# Patient Record
Sex: FEMALE | Race: WHITE | NOT HISPANIC OR LATINO | Employment: OTHER | URBAN - METROPOLITAN AREA
[De-identification: names, ages, dates, MRNs, and addresses within clinical notes are randomized per-mention and may not be internally consistent; named-entity substitution may affect disease eponyms.]

---

## 2017-01-05 ENCOUNTER — TELEPHONE (OUTPATIENT)
Dept: NEUROLOGY | Facility: CLINIC | Age: 82
End: 2017-01-05

## 2017-01-05 NOTE — TELEPHONE ENCOUNTER
----- Message from Josr Agudelo sent at 1/5/2017  3:03 PM CST -----  Contact: Self [phone]  Patient is returning a missed call, \[facility]\"" call

## 2017-01-05 NOTE — TELEPHONE ENCOUNTER
----- Message from Josr Agudelo sent at 1/5/2017  1:32 PM CST -----  Contact: Suzan ( daughter ) [phone]  Caller is calling to schedule a f/u visit, pls call

## 2017-01-05 NOTE — TELEPHONE ENCOUNTER
Called daughter and left a voicemail in regards of rescheduling mothers appointment. Informed pt daughter to call back in-order to set up appointment.

## 2017-03-02 ENCOUNTER — OFFICE VISIT (OUTPATIENT)
Dept: PULMONOLOGY | Facility: CLINIC | Age: 82
End: 2017-03-02
Payer: MEDICARE

## 2017-03-02 VITALS
WEIGHT: 108 LBS | RESPIRATION RATE: 14 BRPM | BODY MASS INDEX: 18.44 KG/M2 | HEIGHT: 64 IN | OXYGEN SATURATION: 90 % | DIASTOLIC BLOOD PRESSURE: 58 MMHG | SYSTOLIC BLOOD PRESSURE: 98 MMHG | HEART RATE: 75 BPM

## 2017-03-02 DIAGNOSIS — F03.90 DEMENTIA WITHOUT BEHAVIORAL DISTURBANCE, UNSPECIFIED DEMENTIA TYPE: ICD-10-CM

## 2017-03-02 DIAGNOSIS — R63.4 WEIGHT LOSS: ICD-10-CM

## 2017-03-02 DIAGNOSIS — J44.9 CHRONIC OBSTRUCTIVE PULMONARY DISEASE, UNSPECIFIED COPD TYPE: Primary | ICD-10-CM

## 2017-03-02 PROCEDURE — 1159F MED LIST DOCD IN RCRD: CPT | Mod: S$GLB,,, | Performed by: INTERNAL MEDICINE

## 2017-03-02 PROCEDURE — 99499 UNLISTED E&M SERVICE: CPT | Mod: S$GLB,,, | Performed by: INTERNAL MEDICINE

## 2017-03-02 PROCEDURE — 99213 OFFICE O/P EST LOW 20 MIN: CPT | Mod: S$GLB,,, | Performed by: INTERNAL MEDICINE

## 2017-03-02 PROCEDURE — 1160F RVW MEDS BY RX/DR IN RCRD: CPT | Mod: S$GLB,,, | Performed by: INTERNAL MEDICINE

## 2017-03-02 PROCEDURE — 99999 PR PBB SHADOW E&M-EST. PATIENT-LVL IV: CPT | Mod: PBBFAC,,, | Performed by: INTERNAL MEDICINE

## 2017-03-02 PROCEDURE — 1126F AMNT PAIN NOTED NONE PRSNT: CPT | Mod: S$GLB,,, | Performed by: INTERNAL MEDICINE

## 2017-03-02 PROCEDURE — 1157F ADVNC CARE PLAN IN RCRD: CPT | Mod: S$GLB,,, | Performed by: INTERNAL MEDICINE

## 2017-03-02 RX ORDER — BUDESONIDE AND FORMOTEROL FUMARATE DIHYDRATE 160; 4.5 UG/1; UG/1
2 AEROSOL RESPIRATORY (INHALATION) EVERY 12 HOURS
Qty: 10.2 G | Refills: 6 | Status: SHIPPED | OUTPATIENT
Start: 2017-03-02 | End: 2018-04-03 | Stop reason: SDUPTHER

## 2017-03-02 NOTE — MR AVS SNAPSHOT
[facility] - Pulmonary Services  [address]  [facility] [address]  Phone: [phone]                  Zeynep Hamm   3/2/2017 3:30 PM   Office Visit    Description:  Female : 1931   Provider:  SERGEY Guevara MD   Department:  [facility] - Pulmonary Services           Diagnoses this Visit        Comments    Chronic obstructive pulmonary disease, unspecified COPD type    -  Primary     Dementia without behavioral disturbance, unspecified dementia type         Weight loss                To Do List           Goals (5 Years of Data)     None       These Medications        Disp Refills Start End    budesonide-formoterol 160-4.5 mcg (SYMBICORT) 160-4.5 mcg/actuation HFAA 10.2 g 6 3/2/2017     Inhale 2 puffs into the lungs every 12 (twelve) hours. Controller - Inhalation    Pharmacy: [facility] - [place], [place] - [address] [facility] #: [phone]         [facility] On Call     [facility] On Call Nurse [facility] -  Assistance  Registered nurses in the [facility] provide clinical advisement, health education, appointment booking, and other advisory services.  Call for this free service at [phone].             Medications           Message regarding Medications     Verify the changes and/or additions to your medication regime listed below are the same as discussed with your clinician today.  If any of these changes or additions are incorrect, please notify your healthcare provider.        CHANGE how you are taking these medications     Start Taking Instead of    budesonide-formoterol 160-4.5 mcg (SYMBICORT) 160-4.5 mcg/actuation HFAA SYMBICORT 160-4.5 mcg/actuation HFAA    Dosage:  Inhale 2 puffs into the lungs every 12 (twelve) hours. Controller Dosage:  INHALE 2 SPRAYS TWICE DAILY RINSE MOUTH AFTER USE    Reason for Change:  Reorder            Verify that the below list of medications is an accurate representation of the medications you are currently taking.  If none  reported, the list may be blank. If incorrect, please contact your healthcare provider. Carry this list with you in case of emergency.           Current Medications     budesonide-formoterol 160-4.5 mcg (SYMBICORT) 160-4.5 mcg/actuation HFAA Inhale 2 puffs into the lungs every 12 (twelve) hours. Controller    IBUPROFEN (ADVIL ORAL) Take 1 tablet by mouth as needed.    multivitamin capsule Take 1 capsule by mouth once daily.           Clinical Reference Information           Your Vitals Were     BP                   98/58           Blood Pressure          Most Recent Value    BP  (!)  98/58      Allergies as of 3/2/2017     No Known Allergies      Immunizations Administered on Date of Encounter - 3/2/2017     None      Orders Placed During Today's Visit      Normal Orders This Visit    Ambulatory consult to Neurology       [email or web address] Sign-Up     Activating your [facility] account is as easy as 1-2-3!     1) Visit [email or web address], select Sign Up Now, enter this activation code and your date of birth, then select Next.  [email or web address]  Expires: 4/16/2017  4:12 PM      2) Create a username and password to use when you visit [facility] in the future and select a security question in case you lose your password and select Next.    3) Enter your e-mail address and click Sign Up!    Additional Information  If you have questions, please e-mail [email or web address] or call [phone] to talk to our [facility] staff. Remember, [facility] is NOT to be used for urgent needs. For medical emergencies, dial 911.         Language Assistance Services     ATTENTION: Language assistance services are available, free of charge. Please call [phone].      ATENCIÓN: Si habla español, tiene a davidson disposición servicios gratuitos de asistencia lingüística. Llame al [phone].     WENDY Ý: N?u b?n nói Ti?ng Vi?t, có các d?ch v? h? tr? ngôn ng? mi?n phí dành cho b?n. G?i s? [phone].         [facility] complies  with applicable Federal civil rights laws and does not discriminate on the basis of race, color, national origin, age, disability, or sex.

## 2017-03-03 NOTE — PROGRESS NOTES
Subjective:       Patient ID: Zeynep Hamm is a 85 y.o. female.    Chief Complaint: COPD    HPI Ms. Hamm is an 85-year-old former smoker, who returns for an interval   assessment of chronic obstructive lung disease.  She is accompanied by her   daughter who supervises her medical treatment since Ms. Hamm is having   worsening memory loss.  They report that she has had stable respiratory symptoms   during the past several months.  She continues to use Symbicort and has    recently been making more regular use of this than in the past.    She has not recognized any adverse effects.  She did recently run out of this   medication.  She is not having any nighttime respiratory problems.  She has not   had to take antibiotics for respiratory infections in recent months.    Ms. Hamm was previously scheduled for an evaluation in the Neurology Clinic   due to her memory loss.  This visit had to be canceled.  Her daughter requests   that we help arrange for that consult now.      CB/IN  dd: 03/02/2017 20:10:21 (CST)  td: 03/03/2017 06:21:46 (CST)  Doc ID   #[id number]  Job ID #[id number]    CC:       Review of Systems   Constitutional: Positive for weight loss. Negative for fever and fatigue.   HENT: Negative for postnasal drip, sinus pressure, voice change and congestion.    Respiratory: Positive for cough, shortness of breath and dyspnea on extertion. Negative for sputum production and wheezing.    Cardiovascular: Negative for chest pain and leg swelling.   Genitourinary: Negative for difficulty urinating.   Musculoskeletal: Negative for arthralgias and back pain.   Skin: Negative for rash.   Gastrointestinal: Negative for abdominal pain and acid reflux.   Neurological: Negative for dizziness and weakness.   Hematological: Negative for adenopathy.   Psychiatric/Behavioral: Positive for confusion.       Objective:      Physical Exam   Constitutional: She is oriented to person, place, and time. She appears  well-developed. She appears cachectic.   Chronically ill appearing, here in wheelchair.   HENT:   Head: Normocephalic.   Nose: Nose normal.   Mouth/Throat: No oropharyngeal exudate.   Neck: Normal range of motion. No JVD present. No tracheal deviation present. No thyromegaly present.   Cardiovascular: Normal rate, regular rhythm and normal heart sounds.    No murmur heard.  Pulmonary/Chest: Symmetric chest wall expansion. No stridor. She has decreased breath sounds. Wheezes: mild exp wheezes posteriorly. She has no rhonchi. She has no rales. She exhibits no tenderness.   Abdominal: Soft. There is no tenderness.   Musculoskeletal: She exhibits no edema.   Lymphadenopathy:     She has no cervical adenopathy.   Neurological: She is alert and oriented to person, place, and time.   Skin: Skin is warm and dry. No rash noted. No erythema. Nails show no clubbing.   Psychiatric: She has a normal mood and affect.   Vitals reviewed.    Personal Diagnostic Review    Pulmonary Function Tests 3/2/2017   SpO2 90   Height 64.000   Weight 1728.41   BMI (Calculated) 18.6         Assessment:       1. Chronic obstructive pulmonary disease, unspecified COPD type    2. Dementia without behavioral disturbance, unspecified dementia type    3. Weight loss        Outpatient Encounter Prescriptions as of 3/2/2017   Medication Sig Dispense Refill    budesonide-formoterol 160-4.5 mcg (SYMBICORT) 160-4.5 mcg/actuation HFAA Inhale 2 puffs into the lungs every 12 (twelve) hours. Controller 10.2 g 6    IBUPROFEN (ADVIL ORAL) Take 1 tablet by mouth as needed.      multivitamin capsule Take 1 capsule by mouth once daily.      [DISCONTINUED] SYMBICORT 160-4.5 mcg/actuation HFAA INHALE 2 SPRAYS TWICE DAILY RINSE MOUTH AFTER USE 10.2 g 6     No facility-administered encounter medications on file as of 3/2/2017.      Orders Placed This Encounter   Procedures    Ambulatory consult to Neurology     Referral Priority:   Routine     Referral Type:    Consultation     Referral Reason:   Specialty Services Required     Requested Specialty:   Neurology     Number of Visits Requested:   1     Plan:     Refills ordered for Symbicort.  Discussed the early role for antibiotics for treatment of respiratory infection.  Discussed the potential role for Albuterol for control of acute resp symptoms.   Return visit 6 months with CXR before.

## 2017-06-02 DIAGNOSIS — R06.02 SOB (SHORTNESS OF BREATH): Primary | ICD-10-CM

## 2017-06-30 ENCOUNTER — OFFICE VISIT (OUTPATIENT)
Dept: PULMONOLOGY | Facility: CLINIC | Age: 82
End: 2017-06-30
Payer: MEDICARE

## 2017-06-30 ENCOUNTER — HOSPITAL ENCOUNTER (OUTPATIENT)
Dept: PULMONOLOGY | Facility: CLINIC | Age: 82
Discharge: HOME | End: 2017-06-30
Payer: MEDICARE

## 2017-06-30 VITALS
DIASTOLIC BLOOD PRESSURE: 60 MMHG | WEIGHT: 113.13 LBS | BODY MASS INDEX: 20.82 KG/M2 | SYSTOLIC BLOOD PRESSURE: 118 MMHG | HEIGHT: 62 IN | RESPIRATION RATE: 14 BRPM | OXYGEN SATURATION: 93 % | HEART RATE: 75 BPM

## 2017-06-30 DIAGNOSIS — J47.9 ADULT BRONCHIECTASIS: Primary | ICD-10-CM

## 2017-06-30 DIAGNOSIS — J44.9 CHRONIC OBSTRUCTIVE PULMONARY DISEASE, UNSPECIFIED COPD TYPE: ICD-10-CM

## 2017-06-30 DIAGNOSIS — J84.10 POSTINFLAMMATORY PULMONARY FIBROSIS: ICD-10-CM

## 2017-06-30 DIAGNOSIS — R06.02 SOB (SHORTNESS OF BREATH): ICD-10-CM

## 2017-06-30 DIAGNOSIS — F03.90 DEMENTIA WITHOUT BEHAVIORAL DISTURBANCE, UNSPECIFIED DEMENTIA TYPE: ICD-10-CM

## 2017-06-30 PROCEDURE — 99214 OFFICE O/P EST MOD 30 MIN: CPT | Mod: S$GLB,,, | Performed by: INTERNAL MEDICINE

## 2017-06-30 PROCEDURE — 1126F AMNT PAIN NOTED NONE PRSNT: CPT | Mod: S$GLB,,, | Performed by: INTERNAL MEDICINE

## 2017-06-30 PROCEDURE — 1159F MED LIST DOCD IN RCRD: CPT | Mod: S$GLB,,, | Performed by: INTERNAL MEDICINE

## 2017-06-30 PROCEDURE — 99499 UNLISTED E&M SERVICE: CPT | Mod: S$GLB,,, | Performed by: INTERNAL MEDICINE

## 2017-06-30 PROCEDURE — 99999 PR PBB SHADOW E&M-EST. PATIENT-LVL III: CPT | Mod: PBBFAC,,, | Performed by: INTERNAL MEDICINE

## 2017-06-30 RX ORDER — ALBUTEROL SULFATE 90 UG/1
2 AEROSOL, METERED RESPIRATORY (INHALATION) EVERY 4 HOURS PRN
Qty: 1 EACH | Refills: 12 | Status: SHIPPED | OUTPATIENT
Start: 2017-06-30

## 2017-06-30 NOTE — PATIENT INSTRUCTIONS
Continue Symbicort.  Begin trial with Albuterol HFA prior to activities (daughter aware).  If exertional dyspnea remains unimproved, consider 6MWT   to investigate possible exercise related hypoxemia.

## 2017-06-30 NOTE — PROGRESS NOTES
Subjective:       Patient ID: Zeynep Hamm is a 85 y.o. female.    Chief Complaint: Shortness of Breath    HPI HISTORY OF PRESENT ILLNESS:  Ms. Hamm is an 85-year-old former smoker who   comes for an interval assessment of shortness of breath.  She is here with one   of her daughters, who helps watch over her medical care.  Ms. Hamm is   unable to give a reliable history due to memory loss.  Family members note that   she appears short of breath with modest daily activities such as walking up one   flight of stairs at home or walking from house out to the car.  Her shortness of   breath seems somewhat variable from day to day.  She is not having any symptoms   to suggest an active respiratory infection.  She does not seem to have any   respiratory difficulty during sleep.  She continues to use Symbicort, but does   miss occasional doses of this medication when not supervised.  She has not had   any peripheral edema.  Her appetite remains poor, but her weight has been   relatively stable during the past several months.    Finalizing this dictation was delayed by the recent disruption in transcription services.    For expediency, it is being signed without review.      LEO/EMILEE  dd: 07/15/2017 13:46:56 (CDT)  td: 07/16/2017 02:23:54 (CDT)  Doc ID   #[id number]  Job ID #[id number]    CC:       Review of Systems   Constitutional: Positive for weight loss. Negative for fever and fatigue.   HENT: Negative for postnasal drip, sinus pressure, voice change and congestion.    Respiratory: Positive for dyspnea on extertion. Negative for cough, sputum production, shortness of breath and wheezing.    Cardiovascular: Negative for chest pain and leg swelling.   Genitourinary: Negative for difficulty urinating.   Musculoskeletal: Negative for arthralgias and back pain.   Skin: Negative for rash.   Gastrointestinal: Negative for abdominal pain and acid reflux.   Neurological: Negative for dizziness and weakness.    Hematological: Negative for adenopathy.   Psychiatric/Behavioral: Positive for confusion.       Objective:      Physical Exam   Constitutional: She appears well-developed and well-nourished.   HENT:   Head: Normocephalic.   Nose: Nose normal.   Mouth/Throat: No oropharyngeal exudate.   Neck: Normal range of motion. No JVD present. No tracheal deviation present. No thyromegaly present.   Cardiovascular: Normal rate, regular rhythm and normal heart sounds.    No murmur heard.  Pulmonary/Chest: Symmetric chest wall expansion. No stridor. She has no wheezes. She has no rhonchi. She has no rales. She exhibits no tenderness.   Abdominal: Soft. There is no tenderness.   Musculoskeletal: She exhibits no edema.   Lymphadenopathy:     She has no cervical adenopathy.   Neurological: She is alert.   Skin: Skin is warm and dry. No rash noted. No erythema. Nails show no clubbing.   Psychiatric: She has a normal mood and affect.   Vitals reviewed.    Personal Diagnostic Review    No flowsheet data found.      Assessment:       1. Adult bronchiectasis    2. Chronic obstructive pulmonary disease, unspecified COPD type    3. Dementia without behavioral disturbance, unspecified dementia type    4. Postinflammatory pulmonary fibrosis        Outpatient Encounter Prescriptions as of 6/30/2017   Medication Sig Dispense Refill    albuterol 90 mcg/actuation inhaler Inhale 2 puffs into the lungs every 4 (four) hours as needed for Wheezing. 1 each 12    budesonide-formoterol 160-4.5 mcg (SYMBICORT) 160-4.5 mcg/actuation HFAA Inhale 2 puffs into the lungs every 12 (twelve) hours. Controller 10.2 g 6    IBUPROFEN (ADVIL ORAL) Take 1 tablet by mouth as needed.      multivitamin capsule Take 1 capsule by mouth once daily.       No facility-administered encounter medications on file as of 6/30/2017.      No orders of the defined types were placed in this encounter.    Plan:     Continue Symbicort.  Begin trial with Albuterol HFA prior to  activities (daughter aware).  If exertional dyspnea remains unimproved, consider 6MWT   to investigate possible exercise related hypoxemia.

## 2017-10-16 ENCOUNTER — HOSPITAL ENCOUNTER (OUTPATIENT)
Dept: PULMONOLOGY | Facility: CLINIC | Age: 82
Discharge: HOME | End: 2017-10-16
Payer: MEDICARE

## 2017-10-16 ENCOUNTER — OFFICE VISIT (OUTPATIENT)
Dept: PULMONOLOGY | Facility: CLINIC | Age: 82
End: 2017-10-16
Payer: MEDICARE

## 2017-10-16 VITALS
WEIGHT: 112.88 LBS | OXYGEN SATURATION: 90 % | DIASTOLIC BLOOD PRESSURE: 56 MMHG | HEIGHT: 65 IN | BODY MASS INDEX: 18.81 KG/M2 | SYSTOLIC BLOOD PRESSURE: 92 MMHG | RESPIRATION RATE: 16 BRPM | HEART RATE: 102 BPM

## 2017-10-16 VITALS — WEIGHT: 112 LBS | HEIGHT: 64 IN | BODY MASS INDEX: 19.12 KG/M2

## 2017-10-16 DIAGNOSIS — F03.90 DEMENTIA WITHOUT BEHAVIORAL DISTURBANCE, UNSPECIFIED DEMENTIA TYPE: ICD-10-CM

## 2017-10-16 DIAGNOSIS — J47.9 ADULT BRONCHIECTASIS: Primary | ICD-10-CM

## 2017-10-16 DIAGNOSIS — R06.09 DYSPNEA ON EXERTION: ICD-10-CM

## 2017-10-16 DIAGNOSIS — J44.9 CHRONIC OBSTRUCTIVE PULMONARY DISEASE, UNSPECIFIED COPD TYPE: ICD-10-CM

## 2017-10-16 PROCEDURE — 94620 PR PULMONARY STRESS TESTING,SIMPLE: CPT | Mod: S$GLB,,, | Performed by: INTERNAL MEDICINE

## 2017-10-16 PROCEDURE — 99999 PR PBB SHADOW E&M-EST. PATIENT-LVL III: CPT | Mod: PBBFAC,,, | Performed by: INTERNAL MEDICINE

## 2017-10-16 PROCEDURE — 99499 UNLISTED E&M SERVICE: CPT | Mod: S$GLB,,, | Performed by: INTERNAL MEDICINE

## 2017-10-16 PROCEDURE — 99214 OFFICE O/P EST MOD 30 MIN: CPT | Mod: 25,S$GLB,, | Performed by: INTERNAL MEDICINE

## 2017-10-16 NOTE — PROCEDURES
Zeynep Hamm is a 86 y.o.  female patient, who presents for a 6 minute walk test ordered by MD Paola.  The diagnosis is Qualify for Oxygen.  The patient's BMI is 19.3kg/m2. Predicted distance (lower limit of normal) is 256.19 meters.    Test Results:    The test was not completed.  The patient stopped 3 times for a total of 148 seconds.  The total time walked was 212 seconds.  During walking, the patient reported:  Dyspnea. The patient used no assistive devices during testing.     10/16/2017---------Distance: 182.88 meters (600 feet)     O2 Sat % Supplemental Oxygen Heart Rate Blood Pressure Ervin Scale   Pre-exercise  (Resting) 92 % Room Air 94 bpm 116/64 3   During Exercise 79 % Room Air 103 bpm 118/65 3   Post-exercise   95 % Room Air  91 bpm         Recovery Time: 187 seconds    Oxygen Qualification:     O2 Sat % Supplemental Oxygen Heart Rate Blood Pressure Ervin Scale   Pre-exercise  (Resting) 97 % 2 L/M  91 bpm  118/65  3    During Exercise 93 %  2 L/M  96 bpm  112/71  3    Post-exercise   95 %  2 L/M  92 bpm            Recovery Time: 88 seconds    Performing nurse/tech: TRACY Couch    PREVIOUS STUDY:   The patient has not had a previous study.      CLINICAL INTERPRETATION:  Six minute walk distance is 182.88 meters (600 feet) with moderate dyspnea.  During exercise, there was significant desaturation while breathing room air.  Both blood pressure and heart rate remained stable with walking.  The patient did not report non-pulmonary symptoms during exercise.  Significant exercise impairment is likely due to desaturation.  The patient did not complete the study, walking 212 seconds of the 360 second test.  The patient may benefit from using supplemental oxygen during exertion.  No previous study performed.  Based upon age and body mass index, exercise capacity is less than predicted.   Oxygen saturation did improve while breathing supplemental oxygen.

## 2017-10-17 NOTE — PROGRESS NOTES
Subjective:       Patient ID: Zeynep Hamm is a 86 y.o. female.    Chief Complaint: Shortness of Breath    HPI HISTORY OF PRESENT ILLNESS:  Mrs. Hamm is an 86-year-old former smoker who   comes with her daughter for assessment of continued exertional dyspnea.  She had   a visit here in June and at that time began a trial of treatment with albuterol   prior to daily activities.  Today, they report that she has not had any   definite beneficial response to this bronchodilator.  She remains on   Symbicort twice daily.    Mrs. Hamm is not having any symptoms to suggest an active respiratory   infection.  She has not been noted to have any obvious wheezing.  Her daughter   supervises her medication use and feels that she is able to use the metered-dose   inhalers properly.  She has not had any episodes of pleuritic or exertional   chest pain.    Because of significant memory loss, Mrs. Hamm is not able to provide   any reliable details regarding her recent symptoms.  Her daughter   provides virtually all the information relating to her symptoms.      CB/HN  dd: 10/16/2017 20:15:13 (CDT)  td: 10/17/2017 08:34:48 (CDT)  Doc ID   #[id number]  Job ID #[id number]    CC:       Review of Systems   Constitutional: Positive for appetite change. Negative for fever and fatigue.   HENT: Negative for postnasal drip, sinus pressure, voice change and congestion.    Respiratory: Positive for shortness of breath and dyspnea on extertion. Negative for cough, sputum production and wheezing.    Cardiovascular: Negative for chest pain and leg swelling.   Genitourinary: Negative for difficulty urinating.   Musculoskeletal: Negative for arthralgias and back pain.   Skin: Negative for rash.   Gastrointestinal: Negative for abdominal pain and acid reflux.   Neurological: Negative for dizziness and weakness.   Hematological: Negative for adenopathy.   Psychiatric/Behavioral: Positive for confusion.       Objective:      Physical Exam    Constitutional: She is oriented to person, place, and time. She appears well-developed and well-nourished.   HENT:   Head: Normocephalic.   Nose: Nose normal.   Mouth/Throat: No oropharyngeal exudate.   Neck: Normal range of motion. No JVD present. No tracheal deviation present. No thyromegaly present.   Cardiovascular: Normal rate, regular rhythm and normal heart sounds.    No murmur heard.  Pulmonary/Chest: Symmetric chest wall expansion. No stridor. She has no wheezes. She has no rhonchi. She has no rales. She exhibits no tenderness.   Abdominal: Soft. There is no tenderness.   Musculoskeletal: She exhibits no edema.   Lymphadenopathy:     She has no cervical adenopathy.   Neurological: She is alert and oriented to person, place, and time.   Skin: Skin is warm and dry. No rash noted. No erythema. Nails show no clubbing.   Psychiatric: She has a normal mood and affect.   Vitals reviewed.    Personal Diagnostic Review    Pulmonary Function Tests 10/16/2017   Ordering Provider MD Paola   Performing nurse/tech/RT TRACY Couch   Diagnosis Qualify for Oxygen   Height 64   Weight 1792   BMI (Calculated) 19.3   Patient Race    6MWT Status not completed   Patient Reported Dyspnea   Was O2 used? No   6MW Distance walked (feet) 600   Distance walked (meters) 182.88   Did patient stop? Yes   How many times? 3   Stop Time 1 69   Restart Time 1 81   Stop Time 2 150   Restart Time 2 169   Stop Time 3 243   Did patient restart? No   Type of assistive device(s) used? no assistive devices   Oxygen Saturation 92   Supplemental Oxygen Room Air   Heart Rate 94   Blood Pressure 116/64   Ervin Dyspnea Rating  moderate   Oxygen Saturation 79   Supplemental Oxygen Room Air   Heart Rate 103   Blood Pressure 118/65   Ervin Dyspnea Rating  moderate   Recovery Time (seconds) 187   Oxygen Saturation 95   Supplemental Oxygen Room Air   Heart Rate 91   Is procedure ready for interpretation? Yes   Oxygen Qualification? Yes   Oxygen  Saturation 97   Supplemental Oxygen 2 L/M   Heart Rate 91   Blood Pressure 118/65   Ervin Dyspnea Rating  moderate   Oxygen Saturation 93   Supplemental Oxygen 2 L/M   Heart Rate 96   Blood Pressure 112/71   Ervin Dyspnea Rating  moderate   Recovery Time (seconds) 88   Oxygen Saturation 95   Supplemental Oxygen 2 L/M   Heart Rate 92   Some recent data might be hidden         Assessment:       1. Adult bronchiectasis    2. Dyspnea on exertion    3. Dementia without behavioral disturbance, unspecified dementia type    4. Chronic obstructive pulmonary disease, unspecified COPD type        Outpatient Encounter Prescriptions as of 10/16/2017   Medication Sig Dispense Refill    albuterol 90 mcg/actuation inhaler Inhale 2 puffs into the lungs every 4 (four) hours as needed for Wheezing. 1 each 12    budesonide-formoterol 160-4.5 mcg (SYMBICORT) 160-4.5 mcg/actuation HFAA Inhale 2 puffs into the lungs every 12 (twelve) hours. Controller 10.2 g 6    IBUPROFEN (ADVIL ORAL) Take 1 tablet by mouth as needed.      multivitamin capsule Take 1 capsule by mouth once daily.       No facility-administered encounter medications on file as of 10/16/2017.      Orders Placed This Encounter   Procedures    Six Minute Walk Test to qualify for Home Oxygen     Standing Status:   Future     Number of Occurrences:   1     Standing Expiration Date:   10/17/2018     Plan:     Continue present respiratory medications (roles reviewed at today's visit).  6 MWT (O2 Qual protocol).  Begin trial with home O2 if above study confirms exercise related hypoxemia.

## 2017-10-17 NOTE — PATIENT INSTRUCTIONS
Continue present respiratory medications (roles reviewed at today's visit).  6 MWT (O2 Qual protocol).  Begin trial with home O2 if above study confirms exercise related hypoxemia.

## 2017-10-23 ENCOUNTER — TELEPHONE (OUTPATIENT)
Dept: PULMONOLOGY | Facility: CLINIC | Age: 82
End: 2017-10-23

## 2017-10-23 DIAGNOSIS — R09.02 EXERCISE HYPOXEMIA: ICD-10-CM

## 2017-10-23 NOTE — TELEPHONE ENCOUNTER
Pt's daughter informed that the recent 6MWT does show significant exercise related hypoxemia.  She qualifies for trial with nasal O2 during activities, and should also use this during sleep.

## 2017-10-24 DIAGNOSIS — F02.80 DEMENTIA ASSOCIATED WITH OTHER UNDERLYING DISEASE WITHOUT BEHAVIORAL DISTURBANCE: ICD-10-CM

## 2017-10-24 DIAGNOSIS — J84.10 POSTINFLAMMATORY PULMONARY FIBROSIS: ICD-10-CM

## 2017-10-24 DIAGNOSIS — J47.9 ADULT BRONCHIECTASIS: ICD-10-CM

## 2017-10-24 DIAGNOSIS — J43.2 CENTRILOBULAR EMPHYSEMA: Primary | ICD-10-CM

## 2017-10-24 DIAGNOSIS — R06.09 DYSPNEA ON EXERTION: ICD-10-CM

## 2017-11-26 ENCOUNTER — OFFICE VISIT (OUTPATIENT)
Dept: URGENT CARE | Facility: CLINIC | Age: 82
End: 2017-11-26
Payer: MEDICARE

## 2017-11-26 VITALS
OXYGEN SATURATION: 95 % | HEART RATE: 101 BPM | SYSTOLIC BLOOD PRESSURE: 102 MMHG | RESPIRATION RATE: 14 BRPM | TEMPERATURE: 97 F | DIASTOLIC BLOOD PRESSURE: 58 MMHG

## 2017-11-26 DIAGNOSIS — M25.551 PAIN OF RIGHT HIP JOINT: Primary | ICD-10-CM

## 2017-11-26 PROCEDURE — 99203 OFFICE O/P NEW LOW 30 MIN: CPT | Mod: S$GLB,,, | Performed by: PHYSICIAN ASSISTANT

## 2017-11-26 NOTE — PATIENT INSTRUCTIONS
Hip Strain    You have a strain of the muscles around the hip joint. A muscle strain is a stretching or tearing of muscle fibers. This causes pain, especially when you move that muscle. There may also be some swelling and bruising.  Home care  · Stay off the injured leg as much as possible until you can walk on it without pain. If you have a lot of pain with walking, crutches or a walker may be prescribed. These can be rented or purchased at many pharmacies and surgical or orthopedic supply stores. Follow your healthcare provider's advice regarding when to begin putting weight on that leg.  · Apply an ice pack over the injured area for 15 to 20 minutes every 3 to 6 hours. You should do this for the first 24 to 48 hours. You can make an ice pack by filling a plastic bag that seals at the top with ice cubes and then wrapping it with a thin towel. Be careful not to injure your skin with the ice treatments. Ice should never be applied directly to skin. Continue the use of ice packs for relief of pain and swelling as needed. After 48 hours, apply heat (warm shower or warm bath) for 15 to 20 minutes several times a day, or alternate ice and heat.  · You may use over-the-counter pain medicine to control pain, unless another pain medicine was prescribed. If you have chronic liver or kidney disease or ever had a stomach ulcer or GI bleeding, talk with your healthcare provider beforeusing these medicines.  · If you play sports, you may resume these activities when you are able to hop and run on the injured leg without pain.  Follow-up care  Follow up with your healthcare provider, or as advised. If your symptoms do not begin to get better after a week, more tests may be needed.  If X-rays were taken, you will be told of any new findings that may affect your care.  When to seek medical advice  Call your healthcare provider right away if any of these occur:  · Increased swelling or bruising  · Increased pain  · Losing the  ability to put weight on the injured side  Date Last Reviewed: 11/19/2015  © 9826-0953 [employer]. [address] [place] [address], [place] [address]. All rights reserved. This information is not intended as a substitute for professional medical care. Always follow your healthcare professional's instructions.      Please follow up with your Primary care provider within 2-5 days if your signs and symptoms have not resolved or worsen.     If your condition worsens or fails to improve we recommend that you receive another evaluation at the emergency room immediately or contact your primary medical clinic to discuss your concerns.   You must understand that you have received an Urgent Care treatment only and that you may be released before all of your medical problems are known or treated. You, the patient, will arrange for follow up care as instructed.

## 2017-11-26 NOTE — PROGRESS NOTES
Subjective:       Patient ID: Zeynep Hamm is a 86 y.o. female.    Vitals:  oral temperature is 97 °F (36.1 °C). Her blood pressure is 102/58 (abnormal) and her pulse is 101. Her respiration is 14 and oxygen saturation is 95%.     Chief Complaint: Hip Pain    PT C/O RIGHT SIDE HIP PAIN, 2 DAYS, PAIN WITH WALKING AND SITTING, TENDER TO TOUCH, TAKING ADVIL WITH MILD RELIEF       Hip Pain    The incident occurred 2 days ago. The incident occurred at home. There was no injury mechanism. The pain is present in the right hip. The pain has been constant since onset. The symptoms are aggravated by movement, palpation and weight bearing. She has tried NSAIDs for the symptoms. The treatment provided mild relief.     Review of Systems   Constitution: Negative for chills and fever.   HENT: Negative for sore throat.    Eyes: Negative for blurred vision.   Cardiovascular: Negative for chest pain.   Respiratory: Negative for shortness of breath.    Skin: Negative for rash.   Musculoskeletal: Positive for joint pain. Negative for back pain.   Gastrointestinal: Negative for abdominal pain, diarrhea, nausea and vomiting.   Neurological: Negative for headaches.   Psychiatric/Behavioral: The patient is not nervous/anxious.        Objective:      Physical Exam   Constitutional: She is oriented to person, place, and time. She appears well-developed and well-nourished. She is cooperative.  Non-toxic appearance. She does not appear ill. No distress.   HENT:   Head: Normocephalic and atraumatic.   Right Ear: Hearing, tympanic membrane, external ear and ear canal normal.   Left Ear: Hearing, tympanic membrane, external ear and ear canal normal.   Nose: Nose normal. No mucosal edema, rhinorrhea or nasal deformity. No epistaxis. Right sinus exhibits no maxillary sinus tenderness and no frontal sinus tenderness. Left sinus exhibits no maxillary sinus tenderness and no frontal sinus tenderness.   Mouth/Throat: Uvula is midline, oropharynx  is clear and moist and mucous membranes are normal. No trismus in the jaw. Normal dentition. No uvula swelling. No posterior oropharyngeal erythema.   Eyes: Conjunctivae and lids are normal. Right eye exhibits no discharge. Left eye exhibits no discharge. No scleral icterus.   Sclera clear bilat   Neck: Trachea normal, normal range of motion, full passive range of motion without pain and phonation normal. Neck supple.   Cardiovascular: Normal rate, regular rhythm, normal heart sounds, intact distal pulses and normal pulses.    Pulmonary/Chest: Effort normal and breath sounds normal. No respiratory distress.   Abdominal: Soft. Normal appearance and bowel sounds are normal. She exhibits no distension, no pulsatile midline mass and no mass. There is no tenderness.   Musculoskeletal: Normal range of motion. She exhibits no edema or deformity.        Right hip: She exhibits tenderness and bony tenderness. She exhibits normal range of motion, normal strength, no swelling, no crepitus, no deformity and no laceration.        Right knee: She exhibits normal range of motion, no swelling, no effusion, no ecchymosis, no deformity, no laceration, no erythema, normal alignment, no LCL laxity, normal patellar mobility, no bony tenderness, normal meniscus and no MCL laxity. No tenderness found.        Legs:  PAIN WITH DILMA EXAM AND ER    Neurological: She is alert and oriented to person, place, and time. She exhibits normal muscle tone. Coordination normal.   Skin: Skin is warm, dry and intact. She is not diaphoretic. No pallor.   Psychiatric: She has a normal mood and affect. Her speech is normal and behavior is normal. Judgment and thought content normal. Cognition and memory are normal.   Nursing note and vitals reviewed.        XRAY: No acute fractures or dislcoations      Assessment:       1. Pain of right hip joint        Plan:         Pain of right hip joint  -     X-Ray Pelvis Routine AP; Future; Expected date:  11/26/2017  -     X-Ray Hip 1 View Right; Future; Expected date: 11/26/2017          Hip Strain    You have a strain of the muscles around the hip joint. A muscle strain is a stretching or tearing of muscle fibers. This causes pain, especially when you move that muscle. There may also be some swelling and bruising.  Home care  · Stay off the injured leg as much as possible until you can walk on it without pain. If you have a lot of pain with walking, crutches or a walker may be prescribed. These can be rented or purchased at many pharmacies and surgical or orthopedic supply stores. Follow your healthcare provider's advice regarding when to begin putting weight on that leg.  · Apply an ice pack over the injured area for 15 to 20 minutes every 3 to 6 hours. You should do this for the first 24 to 48 hours. You can make an ice pack by filling a plastic bag that seals at the top with ice cubes and then wrapping it with a thin towel. Be careful not to injure your skin with the ice treatments. Ice should never be applied directly to skin. Continue the use of ice packs for relief of pain and swelling as needed. After 48 hours, apply heat (warm shower or warm bath) for 15 to 20 minutes several times a day, or alternate ice and heat.  · You may use over-the-counter pain medicine to control pain, unless another pain medicine was prescribed. If you have chronic liver or kidney disease or ever had a stomach ulcer or GI bleeding, talk with your healthcare provider beforeusing these medicines.  · If you play sports, you may resume these activities when you are able to hop and run on the injured leg without pain.  Follow-up care  Follow up with your healthcare provider, or as advised. If your symptoms do not begin to get better after a week, more tests may be needed.  If X-rays were taken, you will be told of any new findings that may affect your care.  When to seek medical advice  Call your healthcare provider right away if any of  these occur:  · Increased swelling or bruising  · Increased pain  · Losing the ability to put weight on the injured side  Date Last Reviewed: 11/19/2015  © 6341-8335 [employer]. [address] [place] [address], [place] [address]. All rights reserved. This information is not intended as a substitute for professional medical care. Always follow your healthcare professional's instructions.      Please follow up with your Primary care provider within 2-5 days if your signs and symptoms have not resolved or worsen.     If your condition worsens or fails to improve we recommend that you receive another evaluation at the emergency room immediately or contact your primary medical clinic to discuss your concerns.   You must understand that you have received an Urgent Care treatment only and that you may be released before all of your medical problems are known or treated. You, the patient, will arrange for follow up care as instructed.

## 2017-11-29 ENCOUNTER — TELEPHONE (OUTPATIENT)
Dept: URGENT CARE | Facility: CLINIC | Age: 82
End: 2017-11-29

## 2018-01-12 ENCOUNTER — TELEPHONE (OUTPATIENT)
Dept: NEUROLOGY | Facility: CLINIC | Age: 83
End: 2018-01-12

## 2018-01-12 NOTE — TELEPHONE ENCOUNTER
patient daughter Ramon is requesting a return call from Shelia in regards to a private conversation before patient appointment 1/31

## 2018-01-12 NOTE — TELEPHONE ENCOUNTER
----- Message from Josr Agudelo sent at 1/11/2018  1:39 PM CST -----  Contact: Ramon ( daughter )  @ [phone]  Caller is requesting a return call about speaking to Shelia in private before the 1-31 appt.

## 2018-01-17 ENCOUNTER — PES CALL (OUTPATIENT)
Dept: ADMINISTRATIVE | Facility: CLINIC | Age: 83
End: 2018-01-17

## 2018-01-30 ENCOUNTER — TELEPHONE (OUTPATIENT)
Dept: NEUROLOGY | Facility: CLINIC | Age: 83
End: 2018-01-30

## 2018-01-30 NOTE — TELEPHONE ENCOUNTER
----- Message from Lance Guevara sent at 1/30/2018  9:16 AM CST -----  Contact: Jan @ [phone]  Jan is asking to speak w/ you about pt's appt on tomorrow. Pls call.

## 2018-01-30 NOTE — TELEPHONE ENCOUNTER
"Spoke to patient daughter who wanted to give Shelia a few notes before seeing patient on 1/31 in order not to upset patient. Ramon states:    Patient likely unable to answer extensive questioning; explains patient is "ok in the moment", but afterwards will have trouble recalling. Recognizes people she sees frequently and has an overall pleasant mood and is agreeable to most things; not combative; no memory medications; patient is total care; does not bathe, brush teeth, cook or eat on own.  Patient has zero appetite; sleeps very well. Patient also has celiac and COPD and uses portable O2. Please advise  "

## 2018-01-31 ENCOUNTER — OFFICE VISIT (OUTPATIENT)
Dept: NEUROLOGY | Facility: CLINIC | Age: 83
End: 2018-01-31
Payer: MEDICARE

## 2018-01-31 VITALS
DIASTOLIC BLOOD PRESSURE: 64 MMHG | BODY MASS INDEX: 19.96 KG/M2 | WEIGHT: 116.88 LBS | SYSTOLIC BLOOD PRESSURE: 93 MMHG | HEART RATE: 91 BPM | HEIGHT: 64 IN

## 2018-01-31 DIAGNOSIS — G30.9 DEMENTIA IN ALZHEIMER'S DISEASE: ICD-10-CM

## 2018-01-31 DIAGNOSIS — F02.80 DEMENTIA IN ALZHEIMER'S DISEASE: ICD-10-CM

## 2018-01-31 PROCEDURE — 99499 UNLISTED E&M SERVICE: CPT | Mod: S$GLB,,, | Performed by: NURSE PRACTITIONER

## 2018-01-31 PROCEDURE — 1126F AMNT PAIN NOTED NONE PRSNT: CPT | Mod: S$GLB,,, | Performed by: NURSE PRACTITIONER

## 2018-01-31 PROCEDURE — 99999 PR PBB SHADOW E&M-EST. PATIENT-LVL III: CPT | Mod: PBBFAC,,, | Performed by: NURSE PRACTITIONER

## 2018-01-31 PROCEDURE — 99205 OFFICE O/P NEW HI 60 MIN: CPT | Mod: S$GLB,,, | Performed by: NURSE PRACTITIONER

## 2018-01-31 PROCEDURE — 3008F BODY MASS INDEX DOCD: CPT | Mod: S$GLB,,, | Performed by: NURSE PRACTITIONER

## 2018-01-31 PROCEDURE — 1159F MED LIST DOCD IN RCRD: CPT | Mod: S$GLB,,, | Performed by: NURSE PRACTITIONER

## 2018-01-31 RX ORDER — DONEPEZIL HYDROCHLORIDE 5 MG/1
5 TABLET, FILM COATED ORAL DAILY
Qty: 30 TABLET | Refills: 11 | Status: ON HOLD | OUTPATIENT
Start: 2018-01-31 | End: 2019-04-20 | Stop reason: HOSPADM

## 2018-01-31 NOTE — LETTER
January 31, 2018      Maya Kelley MD  [address]  [place] [address]           [facility]  [address]  [place] [address]  Phone: [phone]  Fax: [phone]          Patient: Zeynep Hamm   MR Number: [id number]   YOB: 1931   Date of Visit: 1/31/2018       Dear Dr. Maya Kelley:    Thank you for referring Zeynep Hamm to me for evaluation. Attached you will find relevant portions of my assessment and plan of care.    If you have questions, please do not hesitate to call me. I look forward to following Zeynep Hamm along with you.    Sincerely,    Shelia Banerjee, FREDY    Enclosure  CC:  No Recipients    If you would like to receive this communication electronically, please contact [email or web address] or [phone] to request more information on [employer] access.    For providers and/or their staff who would like to refer a patient to [facility], please contact us through our one-stop-shop provider referral line, [facility], at [phone].    If you feel you have received this communication in error or would no longer like to receive these types of communications, please e-mail [email or web address]

## 2018-01-31 NOTE — PATIENT INSTRUCTIONS
I have ordered labs. Please proceed to the 2nd floor for this.    I have also ordered MRI brain. This will be done across the street and the [facility].     I have sent a prescription for donepezil. Please take 5 mg daily.  If you are tolerating this, we can increase this to 10 mg daily.

## 2018-01-31 NOTE — PROGRESS NOTES
"Name: Zeynep Hamm  MRN: [id number]   CSN: [id number]      Date: 1-31-18      Referring physician:  Maya Kelley MD  [address] [place] [address]    Subjective:      Chief Complaint: does not know why she is here    History of Present Illness (HPI):    Zeynep Hamm is a 86 y.o. right-handed female who presents today for an initial evaluation of memory loss and is accompanied her daughter, Ramon. She is not sure why she is here and will look to her daughter to answer. Daughter tells her she is here for pneumonia shot and health assessment. Daughter clearly does not feel comfortable to speak freely.     Daughter called yesterday and spoke to MA to offer insight for today's visit. Per that phone call, states her mother will likely not be able to answer extensive questions. States her mother is "ok" in the moment but will have trouble recalling. Recognizes people she sees frequently and has overall pleasant mood. Agreeable to most things. Not combative. No memory meds. Pt is total care- not able to bathe, brush teeth, cook, or eat independently. No appetite. No issues with sleep. Has celiac, COPD and uses portable O2.  During visit, able to establish with daughter that she is in fact able to self feed but needs encouragement to eat. She toilets independently.       No problems with gait. No falls. Dtr confirms her balance is excellent.   No hallucinations. Dtr agrees.   When asked about her mood, she says she is a "happy, happy person." Daughter agrees she has a very stable mood and has been this way her whole life.   She says she drives. Her dtr shakes her head no.     She says she feels fine and asks again why she is here.    When asked if she lives alone, she looks to her dtr and begins to ask her if she lives alone but then says her son, Thomas, lives upstairs. Dtr confirms that he has lived with her since Stephie.     When asked what typical day is like for her, she says she gets up in the " morning.   When asked what she does after this, says she makes her breakfast- grits, eggs, whatever she feels like eating. Dtr. shakes head no.  Dtr states her other son will come by and cook her breakfast.   Dtr states she comes by often and will help with errands and will fix her lunch. .   Ms. Hamm's brother arnol also come by.     When asked if she ever feels confused, she says no, she knows how many kids she has. She has 3 children (Jarrod, Ramon, and Thomas).    She says her  .     Frequently refrers to painintng in office and how she likes it and asks if I painted it.   WIll ask repeatedly why she is here.     While pt walking for gait eval, asked dtr about timeline. States memory began to decline over the past couple of years.     ROS answered by pt- will ak what is she is doing here? Dtr states she is SOA on exertion.   Review of Systems   Constitutional: Negative for appetite change.   HENT: Negative for trouble swallowing.    Respiratory: Positive for shortness of breath. Negative for choking.    Gastrointestinal: Negative for abdominal pain, constipation, diarrhea, nausea and vomiting.   Genitourinary: Negative.    Neurological: Negative for dizziness, tremors, syncope, light-headedness and headaches.   Psychiatric/Behavioral: Negative for confusion and sleep disturbance.       Past Medical History: The patient  has a past medical history of Bronchiectasis; Cataract; Celiac disease; Emphysema of lung; Encounter for blood transfusion; Lung disease; and Short-term memory loss.    Social History: The patient  reports that she quit smoking about 29 years ago. Her smoking use included Cigarettes. She has a 70.00 pack-year smoking history. She has never used smokeless tobacco. She reports that she does not drink alcohol or use drugs.    Family History: Their family history includes COPD in her brother; Cancer in her father and mother; Glaucoma in her sister; Heart disease in her sister.    Allergies:  "Patient has no known allergies.     Meds:   Current Outpatient Prescriptions on File Prior to Visit   Medication Sig Dispense Refill    albuterol 90 mcg/actuation inhaler Inhale 2 puffs into the lungs every 4 (four) hours as needed for Wheezing. 1 each 12    budesonide-formoterol 160-4.5 mcg (SYMBICORT) 160-4.5 mcg/actuation HFAA Inhale 2 puffs into the lungs every 12 (twelve) hours. Controller 10.2 g 6    multivitamin capsule Take 1 capsule by mouth once daily.      IBUPROFEN (ADVIL ORAL) Take 1 tablet by mouth as needed.       No current facility-administered medications on file prior to visit.        Objective:     Physical Exam:    Vitals:    01/31/18 0930   BP: 93/64   Pulse: 91   Weight: 53 kg (116 lb 13.5 oz)   Height: 5' 4" (1.626 m)     Body mass index is 20.06 kg/m².    Constitutional  Well-developed, well-nourished, appears stated age   Ophthalmoscopic  No papilledema with no hemorrhages or exudates bilaterally   Cardiovascular  Radial pulses 2+ and symmetric, no LE edema bilaterally     ..  Neurological    * Mental status  MOCA = 10+1=11/ 25 (visuospatial deferred as was becoming a bit irritated- would repeat this is silly and she is not stupid)   - Orientation Oriented to: person, place ([facility]), city and daughter.  Not oriented to: time, situation, day of week, date, month of year and year     - Memory     Impaired     - Attention/concentration  Normal     - Executive  Deferred               ..        * Cranial nerves       - CN II  PERRL, visual fields full to confrontation     - CN III, IV, VI  Extraocular movements full, normal pursuits and saccades     - CN V  Sensation V1 - V3 intact     - CN VII  Face strong and symmetric bilaterally     - CN VIII  Hearing intact bilaterally     - CN IX, X  Palate raises midline and symmetric     - CN XI  SCM and trapezius 5/5 bilaterally     - CN XII  Tongue midline   * Motor  Muscle bulk normal, strength 5/5 throughout, except L deltoid 4/5   * Gait  See " below.   * Deep tendon reflexes  2+ and symmetric throughout     ..  * Specialized movement exam  No hypophonic speech.    No facial masking.   No cogwheel rigidity.     No bradykinesia.   No tremor with rest.  Intermittent postural tremor in hands (L>R) and observed once in head -appeared to have no-no quality.     No other dystonia, chorea, athetosis, myoclonus, or tics.   No motor impersistence.   Normal-based gait.   No shortened stride length.   No abnormal arm swing.                 Laboratory Results:  No visits with results within 3 Month(s) from this visit.   Latest known visit with results is:   Lab Visit on 10/13/2016   Component Date Value Ref Range Status    Sodium 10/13/2016 142  136 - 145 mmol/L Final    Potassium 10/13/2016 4.7  3.5 - 5.1 mmol/L Final    Chloride 10/13/2016 102  95 - 110 mmol/L Final    CO2 10/13/2016 26  23 - 29 mmol/L Final    Glucose 10/13/2016 116* 70 - 110 mg/dL Final    BUN, Bld 10/13/2016 18  8 - 23 mg/dL Final    Creatinine 10/13/2016 1.0  0.5 - 1.4 mg/dL Final    Calcium 10/13/2016 9.4  8.7 - 10.5 mg/dL Final    Total Protein 10/13/2016 7.1  6.0 - 8.4 g/dL Final    Albumin 10/13/2016 3.1* 3.5 - 5.2 g/dL Final    Total Bilirubin 10/13/2016 0.5  0.1 - 1.0 mg/dL Final    Alkaline Phosphatase 10/13/2016 75  55 - 135 U/L Final    AST 10/13/2016 16  10 - 40 U/L Final    ALT 10/13/2016 9* 10 - 44 U/L Final    Anion Gap 10/13/2016 14  8 - 16 mmol/L Final    eGFR if  10/13/2016 59.4* >60 mL/min/1.73 m^2 Final    eGFR if non African American 10/13/2016 51.5* >60 mL/min/1.73 m^2 Final    WBC 10/13/2016 10.01  3.90 - 12.70 K/uL Final    RBC 10/13/2016 4.56  4.00 - 5.40 M/uL Final    Hemoglobin 10/13/2016 13.1  12.0 - 16.0 g/dL Final    Hematocrit 10/13/2016 40.1  37.0 - 48.5 % Final    MCV 10/13/2016 88  82 - 98 fL Final    MCH 10/13/2016 28.7  27.0 - 31.0 pg Final    MCHC 10/13/2016 32.7  32.0 - 36.0 % Final    RDW 10/13/2016 13.5  11.5 - 14.5 %  Final    Platelets 10/13/2016 274  150 - 350 K/uL Final    MPV 10/13/2016 10.5  9.2 - 12.9 fL Final    Gran # (ANC) 10/13/2016 7.3  1.8 - 7.7 K/uL Final    Lymph # 10/13/2016 1.5  1.0 - 4.8 K/uL Final    Mono # 10/13/2016 0.9  0.3 - 1.0 K/uL Final    Eos # 10/13/2016 0.2  0.0 - 0.5 K/uL Final    Baso # 10/13/2016 0.03  0.00 - 0.20 K/uL Final    Gran% 10/13/2016 73.3* 38.0 - 73.0 % Final    Lymph% 10/13/2016 15.4* 18.0 - 48.0 % Final    Mono% 10/13/2016 9.3  4.0 - 15.0 % Final    Eosinophil% 10/13/2016 1.6  0.0 - 8.0 % Final    Basophil% 10/13/2016 0.3  0.0 - 1.9 % Final    Differential Method 10/13/2016 Automated   Final    TSH 10/13/2016 0.968  0.400 - 4.000 uIU/mL Final    Vitamin B-12 10/13/2016 758  210 - 950 pg/mL Final    Folate 10/13/2016 19.9  4.0 - 24.0 ng/mL Final       Imaging:   Independent review of images: NA    Assessment and Plan     Dementia in Alzheimer's disease  -     CBC auto differential; Future; Expected date: 01/31/2018  -     Comprehensive metabolic panel; Future; Expected date: 01/31/2018  -     Vitamin B12; Future; Expected date: 01/31/2018  -     FOLATE; Future; Expected date: 01/31/2018  -     T4, FREE; Future; Expected date: 01/31/2018  -     TSH; Future; Expected date: 01/31/2018  -     RPR; Future; Expected date: 01/31/2018  -     MRI Brain Without Contrast; Future; Expected date: 01/31/2018    Other orders  -     donepezil (ARICEPT) 5 MG tablet; Take 1 tablet (5 mg total) by mouth once daily.  Dispense: 30 tablet; Refill: 11        Medical Decision Making:    Suspect Ms. Hamm has dementia of Alzheimer's type based on visit today. However, will need to complete reversible memory labs and obtain MRI brain to assure no other explanation. I did not use the word dementia or Alzheimer's with them at this point as it is clear she did not know she was coming for evaluation of memory and does not endorse memory issues, also not uncommon in AD population.   I have  discussed work-up of labs, MRI brain and neuropsychological evaluation with them. They decline NP dashawn.   Daughter asks about memory meds. Discussed donepezil and goals of treatment. Pt not interested. Dtr wants her to start. I will send Rx for 5 mg daily to pharmacy and they can decide. This can be further titrated if tolerating.  Follow up weeks for evaluation of donepezil (if takes) and results of MRI brain.  Daughter will call back to schedule MRI brain. Number given.            I spent 55 minutes face-to-face with the patient with >50% of the time spent with counseling and education regarding:  - results of data, diagnosis, and recommendations stated above  -rationale of work-up  - expectations of donepezil    Shelia Banerjee, LADARIUS, NP-C  Division of Movement and Memory Disorders  [facility]  [phone]

## 2018-02-05 ENCOUNTER — TELEPHONE (OUTPATIENT)
Dept: NEUROLOGY | Facility: CLINIC | Age: 83
End: 2018-02-05

## 2018-02-05 NOTE — TELEPHONE ENCOUNTER
Called daughter Ramon to schedule patient's 4 week f/u.  Daughter informed me that she was skiing in [place], and would return call when back in town.

## 2018-03-05 ENCOUNTER — LAB VISIT (OUTPATIENT)
Dept: LAB | Facility: HOSPITAL | Age: 83
End: 2018-03-05
Attending: NURSE PRACTITIONER
Payer: MEDICARE

## 2018-03-05 DIAGNOSIS — F02.80 DEMENTIA IN ALZHEIMER'S DISEASE: ICD-10-CM

## 2018-03-05 DIAGNOSIS — G30.9 DEMENTIA IN ALZHEIMER'S DISEASE: ICD-10-CM

## 2018-03-05 LAB
ALBUMIN SERPL BCP-MCNC: 3.5 G/DL
ALP SERPL-CCNC: 89 U/L
ALT SERPL W/O P-5'-P-CCNC: 10 U/L
ANION GAP SERPL CALC-SCNC: 10 MMOL/L
AST SERPL-CCNC: 17 U/L
BASOPHILS # BLD AUTO: 0.07 K/UL
BASOPHILS NFR BLD: 0.8 %
BILIRUB SERPL-MCNC: 0.6 MG/DL
BUN SERPL-MCNC: 17 MG/DL
CALCIUM SERPL-MCNC: 10.1 MG/DL
CHLORIDE SERPL-SCNC: 101 MMOL/L
CO2 SERPL-SCNC: 30 MMOL/L
CREAT SERPL-MCNC: 1 MG/DL
DIFFERENTIAL METHOD: NORMAL
EOSINOPHIL # BLD AUTO: 0.3 K/UL
EOSINOPHIL NFR BLD: 3 %
ERYTHROCYTE [DISTWIDTH] IN BLOOD BY AUTOMATED COUNT: 13.8 %
EST. GFR  (AFRICAN AMERICAN): 58.9 ML/MIN/1.73 M^2
EST. GFR  (NON AFRICAN AMERICAN): 51.1 ML/MIN/1.73 M^2
FOLATE SERPL-MCNC: 17.6 NG/ML
GLUCOSE SERPL-MCNC: 115 MG/DL
HCT VFR BLD AUTO: 41 %
HGB BLD-MCNC: 13.2 G/DL
IMM GRANULOCYTES # BLD AUTO: 0.02 K/UL
IMM GRANULOCYTES NFR BLD AUTO: 0.2 %
LYMPHOCYTES # BLD AUTO: 2.7 K/UL
LYMPHOCYTES NFR BLD: 29.2 %
MCH RBC QN AUTO: 28 PG
MCHC RBC AUTO-ENTMCNC: 32.2 G/DL
MCV RBC AUTO: 87 FL
MONOCYTES # BLD AUTO: 0.7 K/UL
MONOCYTES NFR BLD: 7.9 %
NEUTROPHILS # BLD AUTO: 5.4 K/UL
NEUTROPHILS NFR BLD: 58.9 %
NRBC BLD-RTO: 0 /100 WBC
PLATELET # BLD AUTO: 303 K/UL
PMV BLD AUTO: 10 FL
POTASSIUM SERPL-SCNC: 4.5 MMOL/L
PROT SERPL-MCNC: 8 G/DL
RBC # BLD AUTO: 4.71 M/UL
RPR SER QL: NORMAL
SODIUM SERPL-SCNC: 141 MMOL/L
T4 FREE SERPL-MCNC: 0.85 NG/DL
TSH SERPL DL<=0.005 MIU/L-ACNC: 1.3 UIU/ML
VIT B12 SERPL-MCNC: 860 PG/ML
WBC # BLD AUTO: 9.15 K/UL

## 2018-03-05 PROCEDURE — 82746 ASSAY OF FOLIC ACID SERUM: CPT

## 2018-03-05 PROCEDURE — 86592 SYPHILIS TEST NON-TREP QUAL: CPT

## 2018-03-05 PROCEDURE — 84439 ASSAY OF FREE THYROXINE: CPT

## 2018-03-05 PROCEDURE — 84443 ASSAY THYROID STIM HORMONE: CPT

## 2018-03-05 PROCEDURE — 85025 COMPLETE CBC W/AUTO DIFF WBC: CPT

## 2018-03-05 PROCEDURE — 80053 COMPREHEN METABOLIC PANEL: CPT

## 2018-03-05 PROCEDURE — 82607 VITAMIN B-12: CPT

## 2018-03-05 PROCEDURE — 36415 COLL VENOUS BLD VENIPUNCTURE: CPT

## 2018-03-22 ENCOUNTER — PES CALL (OUTPATIENT)
Dept: ADMINISTRATIVE | Facility: CLINIC | Age: 83
End: 2018-03-22

## 2018-04-03 DIAGNOSIS — J44.9 CHRONIC OBSTRUCTIVE PULMONARY DISEASE, UNSPECIFIED COPD TYPE: ICD-10-CM

## 2018-04-03 RX ORDER — BUDESONIDE AND FORMOTEROL FUMARATE DIHYDRATE 160; 4.5 UG/1; UG/1
AEROSOL RESPIRATORY (INHALATION)
Qty: 11 G | Refills: 6 | Status: SHIPPED | OUTPATIENT
Start: 2018-04-03 | End: 2018-05-18

## 2018-05-18 ENCOUNTER — TELEPHONE (OUTPATIENT)
Dept: PULMONOLOGY | Facility: CLINIC | Age: 83
End: 2018-05-18

## 2018-05-18 DIAGNOSIS — J44.9 CHRONIC OBSTRUCTIVE PULMONARY DISEASE, UNSPECIFIED COPD TYPE: Primary | ICD-10-CM

## 2018-07-05 ENCOUNTER — TELEPHONE (OUTPATIENT)
Dept: PULMONOLOGY | Facility: CLINIC | Age: 83
End: 2018-07-05

## 2018-07-05 NOTE — TELEPHONE ENCOUNTER
----- Message from Paz Gore sent at 7/5/2018  3:17 PM CDT -----  Contact: Daughter- Ramon CavanaughNeeds Advice    Reason for call:    Daughter concern about Zeynep/mother, iscoughing, no fever sounds congested.  Communication Preference: [phone]  Additional Information: Please call

## 2018-08-27 ENCOUNTER — PATIENT OUTREACH (OUTPATIENT)
Dept: ADMINISTRATIVE | Facility: HOSPITAL | Age: 83
End: 2018-08-27

## 2018-08-27 NOTE — PROGRESS NOTES
Spoke with daughter, made her aware of need to schedule her mother's annual visit. She was driving and will call back after checking her schedule. I called back to leave the number to our office on her VM.

## 2018-09-13 ENCOUNTER — PES CALL (OUTPATIENT)
Dept: ADMINISTRATIVE | Facility: CLINIC | Age: 83
End: 2018-09-13

## 2019-01-21 ENCOUNTER — OFFICE VISIT (OUTPATIENT)
Dept: PULMONOLOGY | Facility: CLINIC | Age: 84
End: 2019-01-21
Payer: MEDICARE

## 2019-01-21 VITALS
BODY MASS INDEX: 20.03 KG/M2 | DIASTOLIC BLOOD PRESSURE: 62 MMHG | WEIGHT: 117.31 LBS | OXYGEN SATURATION: 92 % | SYSTOLIC BLOOD PRESSURE: 101 MMHG | HEART RATE: 100 BPM | HEIGHT: 64 IN

## 2019-01-21 DIAGNOSIS — J47.9 ADULT BRONCHIECTASIS: ICD-10-CM

## 2019-01-21 DIAGNOSIS — J43.2 CENTRILOBULAR EMPHYSEMA: ICD-10-CM

## 2019-01-21 PROCEDURE — 99499 UNLISTED E&M SERVICE: CPT | Mod: S$GLB,,, | Performed by: INTERNAL MEDICINE

## 2019-01-21 PROCEDURE — 99999 PR PBB SHADOW E&M-EST. PATIENT-LVL III: CPT | Mod: PBBFAC,HCNC,, | Performed by: INTERNAL MEDICINE

## 2019-01-21 PROCEDURE — 1101F PT FALLS ASSESS-DOCD LE1/YR: CPT | Mod: CPTII,HCNC,S$GLB, | Performed by: INTERNAL MEDICINE

## 2019-01-21 PROCEDURE — 99214 OFFICE O/P EST MOD 30 MIN: CPT | Mod: HCNC,S$GLB,, | Performed by: INTERNAL MEDICINE

## 2019-01-21 PROCEDURE — 1101F PR PT FALLS ASSESS DOC 0-1 FALLS W/OUT INJ PAST YR: ICD-10-PCS | Mod: CPTII,HCNC,S$GLB, | Performed by: INTERNAL MEDICINE

## 2019-01-21 PROCEDURE — 99999 PR PBB SHADOW E&M-EST. PATIENT-LVL III: ICD-10-PCS | Mod: PBBFAC,HCNC,, | Performed by: INTERNAL MEDICINE

## 2019-01-21 PROCEDURE — 99499 RISK ADDL DX/OHS AUDIT: ICD-10-PCS | Mod: S$GLB,,, | Performed by: INTERNAL MEDICINE

## 2019-01-21 PROCEDURE — 99214 PR OFFICE/OUTPT VISIT, EST, LEVL IV, 30-39 MIN: ICD-10-PCS | Mod: HCNC,S$GLB,, | Performed by: INTERNAL MEDICINE

## 2019-01-21 RX ORDER — BUDESONIDE AND FORMOTEROL FUMARATE DIHYDRATE 160; 4.5 UG/1; UG/1
2 AEROSOL RESPIRATORY (INHALATION) 2 TIMES DAILY
Status: ON HOLD | COMMUNITY
Start: 2019-01-08 | End: 2019-05-08 | Stop reason: HOSPADM

## 2019-01-21 NOTE — PROGRESS NOTES
Zeynep Hamm  was seen as a new patient to me for the evaluation of  copd.    CHIEF COMPLAINT:  Bronchiectasis      HISTORY OF PRESENT ILLNESS: Zeynep Hamm is a 87 y.o. female  has a past medical history of Bronchiectasis, Cataract, Celiac disease, Emphysema of lung, Encounter for blood transfusion, Lung disease, and Short-term memory loss.  Patient is here with daughter, Ramon, who provided most of history.  Patient smoked 2 ppd x 35 years.  Quit smoking in her 50s.  Patient is currently on Symbicort bid.  Daughter stopped anora daily since she felt it was not effective.  Patient ambulate unassisted at home.  +rodriguez x 1/2 block.  No chest pain.  No orthopnea.  No PND.    +significant short term memory.      PAST MEDICAL HISTORY:    Active Ambulatory Problems     Diagnosis Date Noted    COPD (chronic obstructive pulmonary disease) 06/26/2013    Postinflammatory pulmonary fibrosis 06/26/2013    Adult bronchiectasis 06/26/2013    Posterior subcapsular cataract 04/14/2014    Post-operative state 05/12/2014    Chronic rhinitis 11/18/2014    Delayed surgical wound healing 08/28/2015    Weight loss 04/05/2016    Dementia 03/02/2017    Dyspnea on exertion 10/16/2017    Exercise hypoxemia 10/23/2017    Dementia in Alzheimer's disease 01/31/2018     Resolved Ambulatory Problems     Diagnosis Date Noted    Open wound of lower leg with complication 05/21/2013    Non-healing surgical wound 06/05/2013    Open wound of leg 08/19/2015     Past Medical History:   Diagnosis Date    Bronchiectasis     Cataract     Celiac disease     Emphysema of lung     Encounter for blood transfusion     Lung disease     Short-term memory loss                 PAST SURGICAL HISTORY:    Past Surgical History:   Procedure Laterality Date    APPENDECTOMY      CATARACT EXTRACTION W/  INTRAOCULAR LENS IMPLANT Right 4/14/14    CATARACT EXTRACTION W/  INTRAOCULAR LENS IMPLANT Left 5/12/14    EXCISION-MASS Left 10/31/2013     Performed by SAL Morgan III, [facility] at [facility] 2ND FLR    EXCISION-SOFT TISSUE - right shin calcium deposits Right 2015    Performed by SAL Morgan III, [facility] at [facility] 2ND FLR    INSERTION, IOL PROSTHESIS Left 2014    Performed by [facility] at [facility]    INSERTION, IOL PROSTHESIS Right 2014    Performed by [facility] at [facility]    PHACOEMULSIFICATION, CATARACT Left 2014    Performed by [facility] at [facility]    PHACOEMULSIFICATION, CATARACT Right 2014    Performed by [facility] at [facility]    TONSILLECTOMY      uterine suspension           FAMILY HISTORY:                Family History   Problem Relation Age of Onset    Cancer Mother     Heart disease Sister     Glaucoma Sister     Cancer Father     COPD Brother     Melanoma Neg Hx     Macular degeneration Neg Hx     Diabetes Neg Hx     Stroke Neg Hx     Thyroid disease Neg Hx     Dementia Neg Hx        SOCIAL HISTORY:          Tobacco:   Social History     Tobacco Use   Smoking Status Former Smoker    Packs/day: 2.00    Years: 35.00    Pack years: 70.00    Types: Cigarettes    Last attempt to quit: 1988    Years since quittin.6   Smokeless Tobacco Never Used     alcohol use:    Social History     Substance and Sexual Activity   Alcohol Use No    Alcohol/week: 0.0 oz    Comment: twice a year on special occassions               Occupation:  homemaker    ALLERGIES:  Review of patient's allergies indicates:  No Known Allergies    CURRENT MEDICATIONS:    Current Outpatient Medications   Medication Sig Dispense Refill    albuterol 90 mcg/actuation inhaler Inhale 2 puffs into the lungs every 4 (four) hours as needed for Wheezing. 1 each 12    donepezil (ARICEPT) 5 MG tablet Take 1 tablet (5 mg total) by mouth once daily. 30 tablet 11    IBUPROFEN (ADVIL ORAL) Take 1 tablet by mouth as needed.      influenza (FLUZONE HIGH-DOSE) 180 mcg/0.5 mL vaccine Inject into the muscle. 0.5 mL  "0    multivitamin capsule Take 1 capsule by mouth once daily.      SYMBICORT 160-4.5 mcg/actuation HFAA       umeclidinium-vilanterol (ANORO ELLIPTA) 62.5-25 mcg/actuation DsDv Inhale 1 puff into the lungs once daily. Controller 1 each 5     No current facility-administered medications for this visit.                   REVIEW OF SYSTEMS:     Pulmonary related symptoms as per HPI.  Gen:  no weight loss, no fever, no night sweat  HEENT:  no visual changes, no sore throat, no hearing loss  CV:  No chest pain, no orthopnea, no PND  GI:  no melena, no hematochezia, no diarhea, no constipation.  :  no dysuria, no hematuria, no hesistancy, no dribbling  Neuro:  no syncope, no vertigo, no tinnitus, +memory loss  Psych:  No homocide or suicide ideation; no depression.  Endocrine:  No heat or cold intolerance.  Sleep:  +mild snoring; no witnessed apnea.  Otherwise, a balance of systems reviewed is negative.          PHYSICAL EXAM:  Vitals:    01/21/19 0851   BP: 101/62   Pulse: 100   SpO2: (!) 92%   Weight: 53.2 kg (117 lb 4.6 oz)   Height: 5' 4" (1.626 m)     Body mass index is 20.13 kg/m².     GENERAL:  well develop; no apparent distress  HEENT:  no nasal congestion; no discharge noted; class 2 modified mallampatti.   NECK:  supple; no palpable masses.  CARDIO: regular rate and rhythm  PULM:  clear to auscultation bilaterally; no intercostals retractions; no accessory muscle usage   ABDOMEN:  soft nontender/nondistended.  +bowel sound  EXTREMITIES no cce  NEURO:  CN II-XII intact.  5/5 motor in all extremities.  sensation grossly intact   to light touch.  PSYCH:  normal affect.  Alert and oriented x 4    LABS  Pulmonary Functions Testing Results(personally reviewed):  2004 ratio of 485; fvc 66%; fev1 41%; dclo 76%  ABG (personally reviewed):  none  CXR (personally reviewed):  4/16/16 hyperinflation; no effusion or consolidation  CT CHEST(personally reviewed):  none    ASSESSMENT/PLAN  Problem List Items Addressed This " Visit     Adult bronchiectasis    Overview     Airway clearance with nebs and inhaler for copd.           COPD (chronic obstructive pulmonary disease)    Overview     Severe obstruction per pft in 2004.  Would not be able to perform pft due to memory issue.  Recommend resumption of anora in addition to symbicort.  Per daughter, she does not wish for intubation or cpr in the case of cardiopulmonary arrest.  However, daughter is hesitant about palliative care referral for advance care planning.  Patient is up to date with vaccination.                 Patient will Follow-up in about 6 months (around 7/21/2019). with md/np.

## 2019-04-09 RX ORDER — BUDESONIDE AND FORMOTEROL FUMARATE DIHYDRATE 160; 4.5 UG/1; UG/1
AEROSOL RESPIRATORY (INHALATION)
Qty: 10.2 G | Refills: 11 | OUTPATIENT
Start: 2019-04-09

## 2019-04-18 ENCOUNTER — HOSPITAL ENCOUNTER (INPATIENT)
Facility: HOSPITAL | Age: 84
LOS: 2 days | Discharge: HOME HEALTH | DRG: 190 | End: 2019-04-20
Attending: EMERGENCY MEDICINE | Admitting: HOSPITALIST
Payer: MEDICARE

## 2019-04-18 DIAGNOSIS — R79.89 ELEVATED TROPONIN: ICD-10-CM

## 2019-04-18 DIAGNOSIS — R06.02 SHORTNESS OF BREATH: ICD-10-CM

## 2019-04-18 DIAGNOSIS — I50.9 CHF (CONGESTIVE HEART FAILURE): ICD-10-CM

## 2019-04-18 DIAGNOSIS — D72.829 LEUKOCYTOSIS, UNSPECIFIED TYPE: ICD-10-CM

## 2019-04-18 DIAGNOSIS — R09.02 HYPOXIA: Primary | ICD-10-CM

## 2019-04-18 PROBLEM — K90.0 CELIAC DISEASE: Status: ACTIVE | Noted: 2019-04-18

## 2019-04-18 LAB
ALBUMIN SERPL BCP-MCNC: 3 G/DL (ref 3.5–5.2)
ALP SERPL-CCNC: 167 U/L (ref 55–135)
ALT SERPL W/O P-5'-P-CCNC: 17 U/L (ref 10–44)
AMORPH CRY UR QL COMP ASSIST: NORMAL
ANION GAP SERPL CALC-SCNC: 12 MMOL/L (ref 8–16)
AST SERPL-CCNC: 38 U/L (ref 10–40)
BASOPHILS # BLD AUTO: 0.06 K/UL (ref 0–0.2)
BASOPHILS NFR BLD: 0.5 % (ref 0–1.9)
BILIRUB SERPL-MCNC: 1.1 MG/DL (ref 0.1–1)
BILIRUB UR QL STRIP: NEGATIVE
BUN SERPL-MCNC: 19 MG/DL (ref 8–23)
CALCIUM SERPL-MCNC: 9.9 MG/DL (ref 8.7–10.5)
CHLORIDE SERPL-SCNC: 98 MMOL/L (ref 95–110)
CLARITY UR REFRACT.AUTO: ABNORMAL
CO2 SERPL-SCNC: 27 MMOL/L (ref 23–29)
COLOR UR AUTO: ABNORMAL
CREAT SERPL-MCNC: 1 MG/DL (ref 0.5–1.4)
DIFFERENTIAL METHOD: ABNORMAL
EOSINOPHIL # BLD AUTO: 0.1 K/UL (ref 0–0.5)
EOSINOPHIL NFR BLD: 0.8 % (ref 0–8)
ERYTHROCYTE [DISTWIDTH] IN BLOOD BY AUTOMATED COUNT: 14.2 % (ref 11.5–14.5)
EST. GFR  (AFRICAN AMERICAN): 58.5 ML/MIN/1.73 M^2
EST. GFR  (NON AFRICAN AMERICAN): 50.8 ML/MIN/1.73 M^2
ESTIMATED AVG GLUCOSE: 108 MG/DL (ref 68–131)
GLUCOSE SERPL-MCNC: 154 MG/DL (ref 70–110)
GLUCOSE UR QL STRIP: NEGATIVE
HBA1C MFR BLD HPLC: 5.4 % (ref 4–5.6)
HCT VFR BLD AUTO: 42.9 % (ref 37–48.5)
HGB BLD-MCNC: 14.3 G/DL (ref 12–16)
HGB UR QL STRIP: NEGATIVE
IMM GRANULOCYTES # BLD AUTO: 0.05 K/UL (ref 0–0.04)
IMM GRANULOCYTES NFR BLD AUTO: 0.4 % (ref 0–0.5)
KETONES UR QL STRIP: NEGATIVE
LACTATE SERPL-SCNC: 2.2 MMOL/L (ref 0.5–2.2)
LEUKOCYTE ESTERASE UR QL STRIP: NEGATIVE
LIPASE SERPL-CCNC: 45 U/L (ref 4–60)
LYMPHOCYTES # BLD AUTO: 1.3 K/UL (ref 1–4.8)
LYMPHOCYTES NFR BLD: 9.6 % (ref 18–48)
MCH RBC QN AUTO: 28.8 PG (ref 27–31)
MCHC RBC AUTO-ENTMCNC: 33.3 G/DL (ref 32–36)
MCV RBC AUTO: 86 FL (ref 82–98)
MICROSCOPIC COMMENT: NORMAL
MONOCYTES # BLD AUTO: 1.2 K/UL (ref 0.3–1)
MONOCYTES NFR BLD: 8.9 % (ref 4–15)
NEUTROPHILS # BLD AUTO: 10.4 K/UL (ref 1.8–7.7)
NEUTROPHILS NFR BLD: 79.8 % (ref 38–73)
NITRITE UR QL STRIP: NEGATIVE
NRBC BLD-RTO: 0 /100 WBC
PH UR STRIP: 5 [PH] (ref 5–8)
PLATELET # BLD AUTO: 197 K/UL (ref 150–350)
PMV BLD AUTO: 10.6 FL (ref 9.2–12.9)
POTASSIUM SERPL-SCNC: 4.8 MMOL/L (ref 3.5–5.1)
PROCALCITONIN SERPL IA-MCNC: 0.19 NG/ML
PROT SERPL-MCNC: 8.2 G/DL (ref 6–8.4)
PROT UR QL STRIP: NEGATIVE
RBC # BLD AUTO: 4.97 M/UL (ref 4–5.4)
RBC #/AREA URNS AUTO: 3 /HPF (ref 0–4)
SODIUM SERPL-SCNC: 137 MMOL/L (ref 136–145)
SP GR UR STRIP: 1.02 (ref 1–1.03)
SQUAMOUS #/AREA URNS AUTO: 0 /HPF
TROPONIN I SERPL DL<=0.01 NG/ML-MCNC: 0.04 NG/ML (ref 0–0.03)
TROPONIN I SERPL DL<=0.01 NG/ML-MCNC: 0.05 NG/ML (ref 0–0.03)
TROPONIN I SERPL DL<=0.01 NG/ML-MCNC: 0.07 NG/ML (ref 0–0.03)
TSH SERPL DL<=0.005 MIU/L-ACNC: 1.44 UIU/ML (ref 0.4–4)
URN SPEC COLLECT METH UR: ABNORMAL
WBC # BLD AUTO: 13 K/UL (ref 3.9–12.7)
WBC #/AREA URNS AUTO: 2 /HPF (ref 0–5)

## 2019-04-18 PROCEDURE — 93010 EKG 12-LEAD: ICD-10-PCS | Mod: HCNC,,, | Performed by: INTERNAL MEDICINE

## 2019-04-18 PROCEDURE — 99285 PR EMERGENCY DEPT VISIT,LEVEL V: ICD-10-PCS | Mod: ,,, | Performed by: PHYSICIAN ASSISTANT

## 2019-04-18 PROCEDURE — 36415 COLL VENOUS BLD VENIPUNCTURE: CPT | Mod: HCNC

## 2019-04-18 PROCEDURE — 99285 EMERGENCY DEPT VISIT HI MDM: CPT | Mod: ,,, | Performed by: PHYSICIAN ASSISTANT

## 2019-04-18 PROCEDURE — 96361 HYDRATE IV INFUSION ADD-ON: CPT | Mod: HCNC

## 2019-04-18 PROCEDURE — 81001 URINALYSIS AUTO W/SCOPE: CPT | Mod: HCNC

## 2019-04-18 PROCEDURE — 84443 ASSAY THYROID STIM HORMONE: CPT | Mod: HCNC

## 2019-04-18 PROCEDURE — 83036 HEMOGLOBIN GLYCOSYLATED A1C: CPT | Mod: HCNC

## 2019-04-18 PROCEDURE — 63600175 PHARM REV CODE 636 W HCPCS: Mod: HCNC | Performed by: HOSPITALIST

## 2019-04-18 PROCEDURE — 99285 EMERGENCY DEPT VISIT HI MDM: CPT | Mod: 25,HCNC

## 2019-04-18 PROCEDURE — 84484 ASSAY OF TROPONIN QUANT: CPT | Mod: 91,HCNC

## 2019-04-18 PROCEDURE — 80053 COMPREHEN METABOLIC PANEL: CPT | Mod: HCNC

## 2019-04-18 PROCEDURE — 96374 THER/PROPH/DIAG INJ IV PUSH: CPT | Mod: HCNC

## 2019-04-18 PROCEDURE — 84145 PROCALCITONIN (PCT): CPT | Mod: HCNC

## 2019-04-18 PROCEDURE — 99223 1ST HOSP IP/OBS HIGH 75: CPT | Mod: AI,HCNC,, | Performed by: HOSPITALIST

## 2019-04-18 PROCEDURE — 85025 COMPLETE CBC W/AUTO DIFF WBC: CPT | Mod: HCNC

## 2019-04-18 PROCEDURE — 25000242 PHARM REV CODE 250 ALT 637 W/ HCPCS: Mod: HCNC | Performed by: HOSPITALIST

## 2019-04-18 PROCEDURE — 83690 ASSAY OF LIPASE: CPT | Mod: HCNC

## 2019-04-18 PROCEDURE — 99223 PR INITIAL HOSPITAL CARE,LEVL III: ICD-10-PCS | Mod: AI,HCNC,, | Performed by: HOSPITALIST

## 2019-04-18 PROCEDURE — 83605 ASSAY OF LACTIC ACID: CPT | Mod: HCNC

## 2019-04-18 PROCEDURE — 94761 N-INVAS EAR/PLS OXIMETRY MLT: CPT | Mod: HCNC

## 2019-04-18 PROCEDURE — 25000003 PHARM REV CODE 250: Mod: HCNC | Performed by: HOSPITALIST

## 2019-04-18 PROCEDURE — 94640 AIRWAY INHALATION TREATMENT: CPT | Mod: HCNC

## 2019-04-18 PROCEDURE — 25000003 PHARM REV CODE 250: Mod: HCNC | Performed by: PHYSICIAN ASSISTANT

## 2019-04-18 PROCEDURE — 11000001 HC ACUTE MED/SURG PRIVATE ROOM: Mod: HCNC

## 2019-04-18 PROCEDURE — 96365 THER/PROPH/DIAG IV INF INIT: CPT | Mod: 59,HCNC

## 2019-04-18 PROCEDURE — 93010 ELECTROCARDIOGRAM REPORT: CPT | Mod: HCNC,,, | Performed by: INTERNAL MEDICINE

## 2019-04-18 PROCEDURE — 63600175 PHARM REV CODE 636 W HCPCS: Mod: HCNC | Performed by: PHYSICIAN ASSISTANT

## 2019-04-18 PROCEDURE — 93005 ELECTROCARDIOGRAM TRACING: CPT | Mod: HCNC

## 2019-04-18 PROCEDURE — 27000221 HC OXYGEN, UP TO 24 HOURS: Mod: HCNC

## 2019-04-18 PROCEDURE — 25000242 PHARM REV CODE 250 ALT 637 W/ HCPCS: Mod: HCNC | Performed by: PHYSICIAN ASSISTANT

## 2019-04-18 RX ORDER — CEFTRIAXONE 1 G/1
1 INJECTION, POWDER, FOR SOLUTION INTRAMUSCULAR; INTRAVENOUS
Status: COMPLETED | OUTPATIENT
Start: 2019-04-18 | End: 2019-04-18

## 2019-04-18 RX ORDER — SODIUM CHLORIDE 0.9 % (FLUSH) 0.9 %
10 SYRINGE (ML) INJECTION
Status: DISCONTINUED | OUTPATIENT
Start: 2019-04-18 | End: 2019-04-20 | Stop reason: HOSPADM

## 2019-04-18 RX ORDER — IPRATROPIUM BROMIDE AND ALBUTEROL SULFATE 2.5; .5 MG/3ML; MG/3ML
3 SOLUTION RESPIRATORY (INHALATION) EVERY 4 HOURS
Status: DISCONTINUED | OUTPATIENT
Start: 2019-04-18 | End: 2019-04-19

## 2019-04-18 RX ORDER — IBUPROFEN 200 MG
24 TABLET ORAL
Status: DISCONTINUED | OUTPATIENT
Start: 2019-04-18 | End: 2019-04-20 | Stop reason: HOSPADM

## 2019-04-18 RX ORDER — TIOTROPIUM BROMIDE 18 UG/1
1 CAPSULE ORAL; RESPIRATORY (INHALATION) DAILY
Status: DISCONTINUED | OUTPATIENT
Start: 2019-04-19 | End: 2019-04-20 | Stop reason: HOSPADM

## 2019-04-18 RX ORDER — GLUCAGON 1 MG
1 KIT INJECTION
Status: DISCONTINUED | OUTPATIENT
Start: 2019-04-18 | End: 2019-04-18 | Stop reason: SDUPTHER

## 2019-04-18 RX ORDER — GLUCAGON 1 MG
1 KIT INJECTION
Status: DISCONTINUED | OUTPATIENT
Start: 2019-04-18 | End: 2019-04-20 | Stop reason: HOSPADM

## 2019-04-18 RX ORDER — IBUPROFEN 200 MG
16 TABLET ORAL
Status: DISCONTINUED | OUTPATIENT
Start: 2019-04-18 | End: 2019-04-18 | Stop reason: SDUPTHER

## 2019-04-18 RX ORDER — IPRATROPIUM BROMIDE AND ALBUTEROL SULFATE 2.5; .5 MG/3ML; MG/3ML
3 SOLUTION RESPIRATORY (INHALATION)
Status: COMPLETED | OUTPATIENT
Start: 2019-04-18 | End: 2019-04-18

## 2019-04-18 RX ORDER — ONDANSETRON 2 MG/ML
4 INJECTION INTRAMUSCULAR; INTRAVENOUS EVERY 8 HOURS PRN
Status: DISCONTINUED | OUTPATIENT
Start: 2019-04-18 | End: 2019-04-20 | Stop reason: HOSPADM

## 2019-04-18 RX ORDER — ASPIRIN 325 MG
325 TABLET ORAL
Status: COMPLETED | OUTPATIENT
Start: 2019-04-18 | End: 2019-04-18

## 2019-04-18 RX ORDER — PREDNISONE 20 MG/1
40 TABLET ORAL DAILY
Status: DISCONTINUED | OUTPATIENT
Start: 2019-04-18 | End: 2019-04-20 | Stop reason: HOSPADM

## 2019-04-18 RX ORDER — IBUPROFEN 200 MG
16 TABLET ORAL
Status: DISCONTINUED | OUTPATIENT
Start: 2019-04-18 | End: 2019-04-20 | Stop reason: HOSPADM

## 2019-04-18 RX ORDER — ACETAMINOPHEN 325 MG/1
650 TABLET ORAL EVERY 6 HOURS PRN
Status: DISCONTINUED | OUTPATIENT
Start: 2019-04-18 | End: 2019-04-20 | Stop reason: HOSPADM

## 2019-04-18 RX ADMIN — IPRATROPIUM BROMIDE AND ALBUTEROL SULFATE 3 ML: .5; 3 SOLUTION RESPIRATORY (INHALATION) at 11:04

## 2019-04-18 RX ADMIN — AZITHROMYCIN DIHYDRATE 500 MG: 500 INJECTION, POWDER, LYOPHILIZED, FOR SOLUTION INTRAVENOUS at 03:04

## 2019-04-18 RX ADMIN — PREDNISONE 40 MG: 20 TABLET ORAL at 02:04

## 2019-04-18 RX ADMIN — THERA TABS 1 TABLET: TAB at 02:04

## 2019-04-18 RX ADMIN — CEFTRIAXONE SODIUM 1 G: 1 INJECTION, POWDER, FOR SOLUTION INTRAMUSCULAR; INTRAVENOUS at 02:04

## 2019-04-18 RX ADMIN — ASPIRIN 325 MG ORAL TABLET 325 MG: 325 PILL ORAL at 02:04

## 2019-04-18 RX ADMIN — IPRATROPIUM BROMIDE AND ALBUTEROL SULFATE 3 ML: .5; 3 SOLUTION RESPIRATORY (INHALATION) at 03:04

## 2019-04-18 RX ADMIN — IPRATROPIUM BROMIDE AND ALBUTEROL SULFATE 3 ML: .5; 3 SOLUTION RESPIRATORY (INHALATION) at 09:04

## 2019-04-18 RX ADMIN — SODIUM CHLORIDE 500 ML: 0.9 INJECTION, SOLUTION INTRAVENOUS at 12:04

## 2019-04-18 NOTE — ED PROVIDER NOTES
Encounter Date: 4/18/2019       History     Chief Complaint   Patient presents with    Shortness of Breath     dec in appetitie, feeling weak for 2-3d, not feeling good ishan , placed on 2 nc as home oxygen     88 y/o WF with history of COPD and dementia presents to the ED with her daughter c/o SOB. She has been having decreased appetite for the past 2 weeks. About 3 days ago she developed fatigue and generalized weakness. 2 days ago, she had worsening SOB and GARCIA. She is on PRN supplemental oxygen (unknown how many L) and for the past 2 days they have increased her oxygen use. No recent abx use, hospitalizations. She has been using at home albuterol with no relief. Reports nausea without vomiting, diarrhea, dark colored urine. Denies fever, chills, chest pain, abdominal pain, lightheadedness, LE edema, URI symptoms.     Majority of the history is provided by the patient's daughter secondary to dementia.     The history is provided by the patient and a relative (daughter).     Review of patient's allergies indicates:  No Known Allergies  Past Medical History:   Diagnosis Date    Alzheimer disease     Bronchiectasis     Cataract     both eyes    Celiac disease     Emphysema of lung     Encounter for blood transfusion     Lung disease     Short-term memory loss      Past Surgical History:   Procedure Laterality Date    APPENDECTOMY      CATARACT EXTRACTION W/  INTRAOCULAR LENS IMPLANT Right 4/14/14    CATARACT EXTRACTION W/  INTRAOCULAR LENS IMPLANT Left 5/12/14    EXCISION-MASS Left 10/31/2013    Performed by SAL [facility] 2ND FLR    EXCISION-SOFT TISSUE - right shin calcium deposits Right 8/19/2015    Performed by SAL Morgan III, MD at [facility] 2ND FLR    INSERTION, IOL PROSTHESIS Left 5/12/2014    Performed by [facility] at [facility] OR    INSERTION, IOL PROSTHESIS Right 4/14/2014    Performed by [facility] at [facility]    PHACOEMULSIFICATION, CATARACT Left 5/12/2014     [facility] at [facility]    PHACOEMULSIFICATION, CATARACT Right 2014    Per[facility] at [facility] OR    TONSILLECTOMY      uterine suspension       Family History   Problem Relation Age of Onset    Cancer Mother     Heart disease Sister     Glaucoma Sister     Cancer Father     COPD Brother     Melanoma Neg Hx     Macular degeneration Neg Hx     Diabetes Neg Hx     Stroke Neg Hx     Thyroid disease Neg Hx     Dementia Neg Hx      Social History     Tobacco Use    Smoking status: Former Smoker     Packs/day: 2.00     Years: 35.00     Pack years: 70.00     Types: Cigarettes     Last attempt to quit: 1988     Years since quittin.9    Smokeless tobacco: Never Used   Substance Use Topics    Alcohol use: No     Alcohol/week: 0.0 oz     Comment: twice a year on special occassions    Drug use: No     Review of Systems   Constitutional: Positive for appetite change (decreased) and fatigue. Negative for chills and fever.   HENT: Negative for congestion, rhinorrhea and sore throat.    Eyes: Negative for photophobia and visual disturbance.   Respiratory: Positive for shortness of breath. Negative for cough.    Cardiovascular: Negative for chest pain and leg swelling.   Gastrointestinal: Positive for diarrhea and nausea. Negative for abdominal pain, constipation and vomiting.   Genitourinary: Negative for dysuria and hematuria.   Musculoskeletal: Negative for neck pain and neck stiffness.   Skin: Negative for rash.   Neurological: Positive for weakness (generalized). Negative for light-headedness, numbness and headaches.   Psychiatric/Behavioral: Positive for confusion (h/o Alzheimers).       Physical Exam     Initial Vitals [19 1055]   BP Pulse Resp Temp SpO2   (!) 96/57 (!) 115 18 97.6 °F (36.4 °C) (!) 90 %      MAP       --         Physical Exam    Nursing note and vitals reviewed.  Constitutional: She appears well-developed and well-nourished. She is not  diaphoretic. No distress.   HENT:   Head: Normocephalic and atraumatic.   Neck: Normal range of motion. Neck supple.   Cardiovascular: Regular rhythm and normal heart sounds. Tachycardia present.  Exam reveals no gallop and no friction rub.    No murmur heard.  No LE edema   Pulmonary/Chest: Breath sounds normal. No respiratory distress. She has no wheezes. She has no rhonchi. She has no rales. She exhibits no tenderness.   On supplemental oxygen via nasal cannula.    Abdominal: Soft. Bowel sounds are normal. There is no tenderness. There is no rebound and no guarding.   Musculoskeletal: Normal range of motion.   Neurological: She is alert and oriented to person, place, and time.   Skin: Skin is warm and dry. No erythema.   Psychiatric: She has a normal mood and affect.         ED Course   Procedures  Labs Reviewed   CBC W/ AUTO DIFFERENTIAL - Abnormal; Notable for the following components:       Result Value    WBC 13.00 (*)     Gran # (ANC) 10.4 (*)     Immature Grans (Abs) 0.05 (*)     Mono # 1.2 (*)     Gran% 79.8 (*)     Lymph% 9.6 (*)     All other components within normal limits   COMPREHENSIVE METABOLIC PANEL - Abnormal; Notable for the following components:    Glucose 154 (*)     Albumin 3.0 (*)     Total Bilirubin 1.1 (*)     Alkaline Phosphatase 167 (*)     eGFR if  58.5 (*)     eGFR if non  50.8 (*)     All other components within normal limits   TROPONIN I - Abnormal; Notable for the following components:    Troponin I 0.073 (*)     All other components within normal limits   URINALYSIS, REFLEX TO URINE CULTURE - Abnormal; Notable for the following components:    Appearance, UA Hazy (*)     All other components within normal limits    Narrative:     Preferred Collection Type->Urine, Clean Catch   TROPONIN I - Abnormal; Notable for the following components:    Troponin I 0.051 (*)     All other components within normal limits    Narrative:     ADD-ON LIPAS #[id number] PER  ELIAZAR OLMEDO MD 15:35  04/18/2019    INFLUENZA A & B BY MOLECULAR   TSH   LACTIC ACID, PLASMA   URINALYSIS MICROSCOPIC    Narrative:     Preferred Collection Type->Urine, Clean Catch   HEMOGLOBIN A1C   PROCALCITONIN   HEMOGLOBIN A1C    Narrative:     ADD-ON GHGB #[id number] PER ELIAZAR OLMEDO MD 14:47  04/18/2019   ADD-ON PCAL #[id number] PER ELIAZAR OLMEDO MD 14:47  04/18/2019    PROCALCITONIN    Narrative:     ADD-ON GHGB #[id number] PER ELIAZAR OLMEDO MD 14:47  04/18/2019   ADD-ON PCAL #[id number] PER ELIAZAR OLMEDO MD 14:47  04/18/2019    LIPASE   TROPONIN I   LIPASE   POCT GLUCOSE MONITORING CONTINUOUS          Imaging Results          X-Ray Chest PA And Lateral (Final result)  Result time 04/18/19 12:41:39    Final result by Aguilar Stanton MD (04/18/19 12:41:39)                 Impression:      Stable radiographic appearance of the chest including chronic lung and osseous findings as described.  No radiographic evidence of acute cardiac or pulmonary process.      Electronically signed by: Aguilar Stanton MD  Date:    04/18/2019  Time:    12:41             Narrative:    EXAMINATION:  XR CHEST PA AND LATERAL    CLINICAL HISTORY:  shortness of breath;    TECHNIQUE:  PA and lateral views of the chest were performed.    COMPARISON:  Prior study dated 5 April 2016    FINDINGS:  Monitor leads and wires are in place.  O2 tubing is also identified.  There is stable appearance of the cardiomediastinal shadow.  The hilar regions are also stable in appearance with no hilar enlargement or radiographic indication of pulmonary edema demonstrated.  There is no pleural effusion.    Pleural apical thickening, greater on the right, is also again demonstrated along with chronic lung findings.  There is no evidence of infiltrate and the lungs are otherwise stable radiographically.    Findings consistent with healed rib fractures on the left along with osteopenic findings and compression fracture of one of the  midthoracic vertebral bodies with exaggerated kyphosis of this portion of the spine is again demonstrated.                                 Medical Decision Making:   History:   Old Medical Records: I decided to obtain old medical records.  Old Records Summarized: records from clinic visits.       <> Summary of Records: BP normally in the 100s systolic  Independently Interpreted Test(s):   I have ordered and independently interpreted X-rays - see summary below.  Clinical Tests:   Lab Tests: Ordered and Reviewed  Radiological Study: Ordered and Reviewed  Medical Tests: Reviewed and Ordered  Other:   I have discussed this case with another health care provider.       <> Summary of the Discussion: Hospital Medicine       APC / Resident Notes:   88 y/o WF with history of COPD and dementia presents to the ED with her daughter c/o SOB. Tachycardic. BP 96/57 - upon chart review this is baseline. Initially 90% on RA - improved to 96% on 3L oxygen via nasal cannula. Lungs clear with no wheezing or crackles. No LE edema. Abdomen soft and nontender. DDx includes but is not limited to dehydration, SHAQ, electrolyte abnormality, pneumonia, COPD exacerbation, UTI, anemia. Will get labs, CXR.    Given 500cc IVF and duoneb in the ED.     Leukocytosis noted with WBC 13.00. Cr normal. Tbili and Alk phos elevated - no RUQ tenderness. TSH normal. Lactic acid normal. Initial troponin elevated at 0.073. UA with no infection.     CXR independently reviewed - no large consolidation. Questionable haziness to the R upper lung.     Will cover with abx for COPD exacerbation/pneumonia given leukocytosis and hypoxia. Rocephin and azithromycin given in the ED. Remains on 3L oxygen via nasal cannula. Will admit to  for further evaluation and management.                  Clinical Impression:       ICD-10-CM ICD-9-CM   1. Hypoxia R09.02 799.02   2. Shortness of breath R06.02 786.05   3. Leukocytosis, unspecified type D72.829 288.60   4. Elevated  troponin R74.8 790.6         Disposition:   Disposition: Admitted  Condition: Serious                        Enid Kathleen PA-C  04/18/19 1555

## 2019-04-18 NOTE — MEDICAL/APP STUDENT
Hospital Medicine  History and Physical Exam    Team: [facility] MED B Dc Gonzalez  Admit Date: 4/18/2019  MARYANN   Principal Problem:  <principal problem not specified>   Patient information was obtained from patient and ER records.   Primary care Physician: Maya Kelley MD  Code status: Full Code    HPI:   86 y/o WF with history of COPD and dementia presents to the ED with her daughter with SOB. She has been having decreased appetite for the past 2 weeks. About 3 days ago she developed fatigue and generalized weakness. 2 days ago, she had worsening SOB and GARCIA. She is on PRN supplemental oxygen and for the past 2 days they have increased her oxygen use. She has been using at home albuterol with no relief. Reports nausea without vomiting, diarrhea, dark colored urine. Denies fever, chills, chest pain, abdominal pain, lightheadedness, LE edema, URI symptoms.     Patient lives in [place] and follows up with pulmonology, last visit was 4 months ago. Prior smoking history from [age] 2pk/day.     Past Medical History: Patient has a past medical history of Alzheimer disease, Bronchiectasis, Cataract, Celiac disease, Emphysema of lung, Encounter for blood transfusion, Lung disease, and Short-term memory loss.    Past Surgical History: Patient has a past surgical history that includes Tonsillectomy; Appendectomy; Cataract extraction w/  intraocular lens implant (Right, 4/14/14); Cataract extraction w/  intraocular lens implant (Left, 5/12/14); and uterine suspension.    Social History: Patient reports that she quit smoking about 30 years ago. Her smoking use included cigarettes. She has a 70.00 pack-year smoking history. She has never used smokeless tobacco. She reports that she does not drink alcohol or use drugs.    Family History: family history includes COPD in her brother; Cancer in her father and mother; Glaucoma in her sister; Heart disease in her sister.    Medications:   Prior to Admission medications     Medication Sig Start Date End Date Taking? Authorizing Provider   albuterol 90 mcg/actuation inhaler Inhale 2 puffs into the lungs every 4 (four) hours as needed for Wheezing. 6/30/17  Yes SERGEY Guevara MD   multivitamin capsule Take 1 capsule by mouth once daily.   Yes Historical Provider, MD   SYMBICORT 160-4.5 mcg/actuation HFAA 2 (two) times daily.  1/8/19  Yes Historical Provider, MD   umeclidinium-vilanterol (ANORO ELLIPTA) 62.5-25 mcg/actuation DsDv Inhale 1 puff into the lungs once daily. Controller 5/18/18  Yes SERGEY Guevara MD   donepezil (ARICEPT) 5 MG tablet Take 1 tablet (5 mg total) by mouth once daily. 1/31/18 1/31/19  Shelia Banerjee NP   IBUPROFEN (ADVIL ORAL) Take 1 tablet by mouth as needed.    Historical Provider, MD   influenza (FLUZONE HIGH-DOSE) 180 mcg/0.5 mL vaccine Inject into the muscle. 10/8/18   Seb Cordero, PharmD       Allergies: Patient has No Known Allergies.    ROS    Constitutional: Positive for appetite change (decreased) and fatigue. Negative for chills and fever.   HENT: Negative for congestion, rhinorrhea and sore throat.    Eyes: Negative for photophobia and visual disturbance.   Respiratory: Positive for shortness of breath. Negative for cough.    Cardiovascular: Negative for chest pain and leg swelling.   Gastrointestinal: Positive for diarrhea and nausea. Negative for abdominal pain, constipation and vomiting.   Genitourinary: Negative for dysuria and hematuria.   Musculoskeletal: Negative for neck pain and neck stiffness.   Skin: Negative for rash.   Neurological: Positive for weakness (generalized). Negative for light-headedness, numbness and headaches.   Psychiatric/Behavioral: Positive for confusion (h/o Alzheimers).     PEx  Temp:  [97.6 °F (36.4 °C)]   Pulse:  [105-115]   Resp:  [18-34]   BP: (96)/(57)   SpO2:  [90 %-99 %]   Body mass index is 18.37 kg/m².     Constitutional: She appears well-developed and well-nourished. She is not diaphoretic. No  distress.   HENT:   Head: Normocephalic and atraumatic.   Neck: Normal range of motion. Neck supple.   Cardiovascular: Regular rhythm and normal heart sounds. Tachycardia present.  Exam reveals no gallop and no friction rub.    No murmur heard.  No LE edema   Pulmonary/Chest: Breath sounds normal. No respiratory distress. She has no wheezes. She has no rhonchi. She has no rales. She exhibits no tenderness.   On supplemental oxygen via nasal cannula.    Abdominal: Soft. Bowel sounds are normal. There is no tenderness. There is no rebound and no guarding.   Musculoskeletal: Normal range of motion.   Neurological: She is alert and oriented to person, place, and time.   Skin: Skin is warm and dry. No erythema.   Psychiatric: She has a normal mood and affect.     Recent Labs   Lab 04/18/19  1145   WBC 13.00*   HGB 14.3   HCT 42.9        Recent Labs   Lab 04/18/19  1145      K 4.8   CL 98   CO2 27   BUN 19   CREATININE 1.0   *   CALCIUM 9.9     Recent Labs   Lab 04/18/19  1145   ALKPHOS 167*   ALT 17   AST 38   ALBUMIN 3.0*   PROT 8.2   BILITOT 1.1*      No results for input(s): POCTGLUCOSE in the last 168 hours.  Recent Labs     04/18/19  1156   LACTATE 2.2        @LABRCNT(CPK:3,CPKMB:3,mb:3,TroponinI:3)  )@No results found for: HGBA1C    Active Hospital Problems    Diagnosis  POA    Hypoxia [R09.02]  Yes      Resolved Hospital Problems   No resolved problems to display.       Assessment and Plan:    Hypoxia  COPD exacerbation  - O2 at home used as needed, currently on 2L NC  - Prednisone started  - Duonebs q4hrs  - Lactic acid normal  - Troponin elevated but trending down    Leukocytosis  - WBC count 13  - Check tomorrow for choice on antibiotics    Dementia in Alzheimer's disease  - No current issues  - Patient Ox2    CKD  - Appears to be at baseline      Discharge plan:    I personally scribed for Buddy Alicea MD on 04/18/2019 at 4:00 PM. Electronically signed by hugh Gonzalez III on  04/18/2019 at 4:00 PM

## 2019-04-18 NOTE — H&P
Hospital Medicine  History and Physical Exam     Team: [facility] MED B Dc Gonzalez  Admit Date: 4/18/2019  MARYANN   Principal Problem:  COPD exacerbation    Patient information was obtained from patient and ER records.   Primary care Physician: Maya Kelley MD  Code status: Full Code     HPI:   88 y/o WF with history of COPD and dementia presents to the ED with her daughter with SOB. She has been having decreased appetite for the past 2 weeks. About 3 days ago she developed fatigue and generalized weakness. 2 days ago, she had worsening SOB and GARCIA. She is on PRN supplemental oxygen and for the past 2 days they have increased her oxygen use. She has been using at home albuterol with no relief. Reports nausea without vomiting, diarrhea, dark colored urine. Denies fever, chills, chest pain, abdominal pain, lightheadedness, LE edema, URI symptoms.      Patient lives in [place] and follows up with pulmonology, last visit was 4 months ago. Prior smoking history from [age] 2pk/day.      Past Medical History: Patient has a past medical history of Alzheimer disease, Bronchiectasis, Cataract, Celiac disease, Emphysema of lung, Encounter for blood transfusion, Lung disease, and Short-term memory loss.     Past Surgical History: Patient has a past surgical history that includes Tonsillectomy; Appendectomy; Cataract extraction w/  intraocular lens implant (Right, 4/14/14); Cataract extraction w/  intraocular lens implant (Left, 5/12/14); and uterine suspension.     Social History: Patient reports that she quit smoking about 30 years ago. Her smoking use included cigarettes. She has a 70.00 pack-year smoking history. She has never used smokeless tobacco. She reports that she does not drink alcohol or use drugs.     Family History: family history includes COPD in her brother; Cancer in her father and mother; Glaucoma in her sister; Heart disease in her sister.     Medications:           Prior to Admission medications     Medication Sig Start Date End Date Taking? Authorizing Provider   albuterol 90 mcg/actuation inhaler Inhale 2 puffs into the lungs every 4 (four) hours as needed for Wheezing. 6/30/17   Yes SERGEY Guevara MD   multivitamin capsule Take 1 capsule by mouth once daily.     Yes Historical Provider, MD   SYMBICORT 160-4.5 mcg/actuation HFAA 2 (two) times daily.  1/8/19   Yes Historical Provider, MD   umeclidinium-vilanterol (ANORO ELLIPTA) 62.5-25 mcg/actuation DsDv Inhale 1 puff into the lungs once daily. Controller 5/18/18   Yes SERGEY Guevara MD   donepezil (ARICEPT) 5 MG tablet Take 1 tablet (5 mg total) by mouth once daily. 1/31/18 1/31/19   Shelia Banerjee NP   IBUPROFEN (ADVIL ORAL) Take 1 tablet by mouth as needed.       Historical Provider, MD   influenza (FLUZONE HIGH-DOSE) 180 mcg/0.5 mL vaccine Inject into the muscle. 10/8/18     Seb Cordero, PharmD         Allergies: Patient has No Known Allergies.     ROS     Constitutional: Positive for appetite change (decreased) and fatigue. Negative for chills and fever.   HENT: Negative for congestion, rhinorrhea and sore throat.    Eyes: Negative for photophobia and visual disturbance.   Respiratory: Positive for shortness of breath. Negative for cough.    Cardiovascular: Negative for chest pain and leg swelling.   Gastrointestinal: Positive for diarrhea and nausea. Negative for abdominal pain, constipation and vomiting.   Genitourinary: Negative for dysuria and hematuria.   Musculoskeletal: Negative for neck pain and neck stiffness.   Skin: Negative for rash.   Neurological: Positive for weakness (generalized). Negative for light-headedness, numbness and headaches.   Psychiatric/Behavioral: Positive for confusion (h/o Alzheimers).      PEx  Temp:  [97.6 °F (36.4 °C)]   Pulse:  [105-115]   Resp:  [18-34]   BP: (96)/(57)   SpO2:  [90 %-99 %]   Body mass index is 18.37 kg/m².     Constitutional: She appears well-developed and well-nourished. She is not  diaphoretic. No distress.   HENT:   Head: Normocephalic and atraumatic.   Neck: Normal range of motion. Neck supple.   Cardiovascular: Regular rhythm and normal heart sounds. Tachycardia present.  Exam reveals no gallop and no friction rub.    No murmur heard.  No LE edema   Pulmonary/Chest: Breath sounds normal. No respiratory distress. She has no wheezes. She has no rhonchi. She has no rales. She exhibits no tenderness.   On supplemental oxygen via nasal cannula.    Abdominal: Soft. Bowel sounds are normal. There is no tenderness. There is no rebound and no guarding.   Musculoskeletal: Normal range of motion.   Neurological: She is alert and oriented to person, place, and time.   Skin: Skin is warm and dry. No erythema.  Calcium deposit on the left anterior shin  Psychiatric: She has a normal mood and affect.          Recent Labs   Lab 04/18/19  1145   WBC 13.00*   HGB 14.3   HCT 42.9             Recent Labs   Lab 04/18/19  1145      K 4.8   CL 98   CO2 27   BUN 19   CREATININE 1.0   *   CALCIUM 9.9          Recent Labs   Lab 04/18/19  1145   ALKPHOS 167*   ALT 17   AST 38   ALBUMIN 3.0*   PROT 8.2   BILITOT 1.1*      No results for input(s): POCTGLUCOSE in the last 168 hours.      Recent Labs     04/18/19  1156   LACTATE 2.2         @LABRCNT(CPK:3,CPKMB:3,mb:3,TroponinI:3)  )@No results found for: HGBA1C           Active Hospital Problems     Diagnosis   POA    Hypoxia [R09.02]   Yes       Resolved Hospital Problems   No resolved problems to display.         Imaging Results          X-Ray Chest PA And Lateral (Final result)  Result time 04/18/19 12:41:39    Final result by Aguilar Stanton MD (04/18/19 12:41:39)             Impression:      Stable radiographic appearance of the chest including chronic lung and osseous findings as described.  No radiographic evidence of acute cardiac or pulmonary process.      Electronically signed by: Aguilar Stanton  MD  Date:    04/18/2019  Time:    12:41           Narrative:    EXAMINATION:  XR CHEST PA AND LATERAL    CLINICAL HISTORY:  shortness of breath;    TECHNIQUE:  PA and lateral views of the chest were performed.    COMPARISON:  Prior study dated 5 April 2016    FINDINGS:  Monitor leads and wires are in place.  O2 tubing is also identified.  There is stable appearance of the cardiomediastinal shadow.  The hilar regions are also stable in appearance with no hilar enlargement or radiographic indication of pulmonary edema demonstrated.  There is no pleural effusion.    Pleural apical thickening, greater on the right, is also again demonstrated along with chronic lung findings.  There is no evidence of infiltrate and the lungs are otherwise stable radiographically.    Findings consistent with healed rib fractures on the left along with osteopenic findings and compression fracture of one of the midthoracic vertebral bodies with exaggerated kyphosis of this portion of the spine is again demonstrated.                              EKG - Normal sinus rhythm @ 99bm   Prolonged QT  Assessment and Plan:     Hypoxia  COPD exacerbation  - O2 at home used as needed, currently on 2L NC  - Prednisone started  - Duonebs q4hrs  - Lactic acid normal  - Troponin elevated but trending down 0.07-->0.05     Leukocytosis  - WBC count 13  - Check tomorrow for choice on antibiotics  - received ceftriaxone and azithromycin in the emergency department  - obtain procalcitonin     Dementia in Alzheimer's disease  - No current issues  - Patient Ox2  - not interested in donepezil.       CKD - stage III  - Appears to be at baseline    Calcium deposit on the left anterior  Previously seen by Plastic surgery and operated firs subcutaneous deposits.  No active issues     Celiac disease  Continue with gluten free diet     Discharge plan:     I personally scribed for Buddy Alicea MD on 04/18/2019 at 4:00 PM. Electronically signed by hugh Irwin  Carlos III on 04/18/2019 at 4:00 PM   The documentation recorded by the scribe accurately reflects service I personally performed and the decisions made by me.  Buddy Alicea MD  Attending Staff Physician  [facility] Medicine  pager- [phone] [phone] - [phone]

## 2019-04-18 NOTE — ED TRIAGE NOTES
Hx COPD. Decreased appetite ~3 weeks. Increased SOB, generalized weakness, fatigue, decreased energy past few days.

## 2019-04-18 NOTE — EKG INTERPRETATIONS - EMERGENCY DEPT.
Independently reviewed:  Rate 105, NSR, sinus tachycardia, no stemi, no ectopy, no hypertrophy, poor qrs progression in V3-4

## 2019-04-19 PROBLEM — E87.5 HYPERKALEMIA: Status: ACTIVE | Noted: 2019-04-19

## 2019-04-19 PROBLEM — R79.89 ELEVATED BRAIN NATRIURETIC PEPTIDE (BNP) LEVEL: Status: ACTIVE | Noted: 2019-04-19

## 2019-04-19 LAB
ALBUMIN SERPL BCP-MCNC: 2.4 G/DL (ref 3.5–5.2)
ALP SERPL-CCNC: 125 U/L (ref 55–135)
ALT SERPL W/O P-5'-P-CCNC: 12 U/L (ref 10–44)
ANION GAP SERPL CALC-SCNC: 10 MMOL/L (ref 8–16)
ANION GAP SERPL CALC-SCNC: 8 MMOL/L (ref 8–16)
AST SERPL-CCNC: 24 U/L (ref 10–40)
BASOPHILS # BLD AUTO: 0 K/UL (ref 0–0.2)
BASOPHILS NFR BLD: 0 % (ref 0–1.9)
BILIRUB SERPL-MCNC: 0.4 MG/DL (ref 0.1–1)
BNP SERPL-MCNC: 376 PG/ML (ref 0–99)
BUN SERPL-MCNC: 20 MG/DL (ref 8–23)
BUN SERPL-MCNC: 20 MG/DL (ref 8–23)
CALCIUM SERPL-MCNC: 8.6 MG/DL (ref 8.7–10.5)
CALCIUM SERPL-MCNC: 8.6 MG/DL (ref 8.7–10.5)
CHLORIDE SERPL-SCNC: 102 MMOL/L (ref 95–110)
CHLORIDE SERPL-SCNC: 98 MMOL/L (ref 95–110)
CO2 SERPL-SCNC: 24 MMOL/L (ref 23–29)
CO2 SERPL-SCNC: 29 MMOL/L (ref 23–29)
CREAT SERPL-MCNC: 0.8 MG/DL (ref 0.5–1.4)
CREAT SERPL-MCNC: 0.8 MG/DL (ref 0.5–1.4)
DIFFERENTIAL METHOD: ABNORMAL
EOSINOPHIL # BLD AUTO: 0 K/UL (ref 0–0.5)
EOSINOPHIL NFR BLD: 0 % (ref 0–8)
ERYTHROCYTE [DISTWIDTH] IN BLOOD BY AUTOMATED COUNT: 14.1 % (ref 11.5–14.5)
EST. GFR  (AFRICAN AMERICAN): >60 ML/MIN/1.73 M^2
EST. GFR  (AFRICAN AMERICAN): >60 ML/MIN/1.73 M^2
EST. GFR  (NON AFRICAN AMERICAN): >60 ML/MIN/1.73 M^2
EST. GFR  (NON AFRICAN AMERICAN): >60 ML/MIN/1.73 M^2
GLUCOSE SERPL-MCNC: 141 MG/DL (ref 70–110)
GLUCOSE SERPL-MCNC: 144 MG/DL (ref 70–110)
HCT VFR BLD AUTO: 35.9 % (ref 37–48.5)
HGB BLD-MCNC: 11.7 G/DL (ref 12–16)
IMM GRANULOCYTES # BLD AUTO: 0.07 K/UL (ref 0–0.04)
IMM GRANULOCYTES NFR BLD AUTO: 0.6 % (ref 0–0.5)
LYMPHOCYTES # BLD AUTO: 0.7 K/UL (ref 1–4.8)
LYMPHOCYTES NFR BLD: 5.7 % (ref 18–48)
MAGNESIUM SERPL-MCNC: 2 MG/DL (ref 1.6–2.6)
MCH RBC QN AUTO: 28.5 PG (ref 27–31)
MCHC RBC AUTO-ENTMCNC: 32.6 G/DL (ref 32–36)
MCV RBC AUTO: 87 FL (ref 82–98)
MONOCYTES # BLD AUTO: 0.6 K/UL (ref 0.3–1)
MONOCYTES NFR BLD: 4.6 % (ref 4–15)
NEUTROPHILS # BLD AUTO: 10.6 K/UL (ref 1.8–7.7)
NEUTROPHILS NFR BLD: 89.1 % (ref 38–73)
NRBC BLD-RTO: 0 /100 WBC
PHOSPHATE SERPL-MCNC: 4.2 MG/DL (ref 2.7–4.5)
PLATELET # BLD AUTO: 163 K/UL (ref 150–350)
PMV BLD AUTO: 10.8 FL (ref 9.2–12.9)
POTASSIUM SERPL-SCNC: 4.6 MMOL/L (ref 3.5–5.1)
POTASSIUM SERPL-SCNC: 5.3 MMOL/L (ref 3.5–5.1)
PROT SERPL-MCNC: 6.5 G/DL (ref 6–8.4)
RBC # BLD AUTO: 4.11 M/UL (ref 4–5.4)
SODIUM SERPL-SCNC: 135 MMOL/L (ref 136–145)
SODIUM SERPL-SCNC: 136 MMOL/L (ref 136–145)
WBC # BLD AUTO: 11.84 K/UL (ref 3.9–12.7)

## 2019-04-19 PROCEDURE — 94640 AIRWAY INHALATION TREATMENT: CPT | Mod: HCNC

## 2019-04-19 PROCEDURE — 94761 N-INVAS EAR/PLS OXIMETRY MLT: CPT | Mod: HCNC

## 2019-04-19 PROCEDURE — 80048 BASIC METABOLIC PNL TOTAL CA: CPT | Mod: HCNC

## 2019-04-19 PROCEDURE — 63600175 PHARM REV CODE 636 W HCPCS: Mod: HCNC | Performed by: HOSPITALIST

## 2019-04-19 PROCEDURE — 25000003 PHARM REV CODE 250: Mod: HCNC | Performed by: HOSPITALIST

## 2019-04-19 PROCEDURE — 27000221 HC OXYGEN, UP TO 24 HOURS: Mod: HCNC

## 2019-04-19 PROCEDURE — 83880 ASSAY OF NATRIURETIC PEPTIDE: CPT | Mod: HCNC

## 2019-04-19 PROCEDURE — 11000001 HC ACUTE MED/SURG PRIVATE ROOM: Mod: HCNC

## 2019-04-19 PROCEDURE — 93010 ELECTROCARDIOGRAM REPORT: CPT | Mod: HCNC,,, | Performed by: INTERNAL MEDICINE

## 2019-04-19 PROCEDURE — 80053 COMPREHEN METABOLIC PANEL: CPT | Mod: HCNC

## 2019-04-19 PROCEDURE — 83735 ASSAY OF MAGNESIUM: CPT | Mod: HCNC

## 2019-04-19 PROCEDURE — 99233 PR SUBSEQUENT HOSPITAL CARE,LEVL III: ICD-10-PCS | Mod: HCNC,,, | Performed by: HOSPITALIST

## 2019-04-19 PROCEDURE — 97535 SELF CARE MNGMENT TRAINING: CPT | Mod: HCNC

## 2019-04-19 PROCEDURE — 36415 COLL VENOUS BLD VENIPUNCTURE: CPT | Mod: HCNC

## 2019-04-19 PROCEDURE — 85025 COMPLETE CBC W/AUTO DIFF WBC: CPT | Mod: HCNC

## 2019-04-19 PROCEDURE — 97530 THERAPEUTIC ACTIVITIES: CPT | Mod: HCNC

## 2019-04-19 PROCEDURE — 84100 ASSAY OF PHOSPHORUS: CPT | Mod: HCNC

## 2019-04-19 PROCEDURE — 25000242 PHARM REV CODE 250 ALT 637 W/ HCPCS: Mod: HCNC | Performed by: HOSPITALIST

## 2019-04-19 PROCEDURE — 93005 ELECTROCARDIOGRAM TRACING: CPT | Mod: HCNC

## 2019-04-19 PROCEDURE — 93010 EKG 12-LEAD: ICD-10-PCS | Mod: HCNC,,, | Performed by: INTERNAL MEDICINE

## 2019-04-19 PROCEDURE — 99233 SBSQ HOSP IP/OBS HIGH 50: CPT | Mod: HCNC,,, | Performed by: HOSPITALIST

## 2019-04-19 RX ORDER — LEVALBUTEROL 1.25 MG/.5ML
1.25 SOLUTION, CONCENTRATE RESPIRATORY (INHALATION) EVERY 6 HOURS
Status: DISCONTINUED | OUTPATIENT
Start: 2019-04-19 | End: 2019-04-20 | Stop reason: HOSPADM

## 2019-04-19 RX ORDER — LEVALBUTEROL 1.25 MG/.5ML
1.25 SOLUTION, CONCENTRATE RESPIRATORY (INHALATION) EVERY 8 HOURS
Status: DISCONTINUED | OUTPATIENT
Start: 2019-04-19 | End: 2019-04-19

## 2019-04-19 RX ORDER — SODIUM POLYSTYRENE SULFONATE 4.1 MEQ/G
30 POWDER, FOR SUSPENSION ORAL; RECTAL ONCE
Status: COMPLETED | OUTPATIENT
Start: 2019-04-19 | End: 2019-04-19

## 2019-04-19 RX ORDER — IPRATROPIUM BROMIDE 0.5 MG/2.5ML
0.5 SOLUTION RESPIRATORY (INHALATION) EVERY 6 HOURS
Status: DISCONTINUED | OUTPATIENT
Start: 2019-04-19 | End: 2019-04-20 | Stop reason: HOSPADM

## 2019-04-19 RX ORDER — FUROSEMIDE 20 MG/1
20 TABLET ORAL DAILY
Status: DISCONTINUED | OUTPATIENT
Start: 2019-04-19 | End: 2019-04-20 | Stop reason: HOSPADM

## 2019-04-19 RX ADMIN — IPRATROPIUM BROMIDE 0.5 MG: 0.5 SOLUTION RESPIRATORY (INHALATION) at 09:04

## 2019-04-19 RX ADMIN — LEVALBUTEROL 1.25 MG: 1.25 SOLUTION, CONCENTRATE RESPIRATORY (INHALATION) at 09:04

## 2019-04-19 RX ADMIN — IPRATROPIUM BROMIDE AND ALBUTEROL SULFATE 3 ML: .5; 3 SOLUTION RESPIRATORY (INHALATION) at 04:04

## 2019-04-19 RX ADMIN — IPRATROPIUM BROMIDE 0.5 MG: 0.5 SOLUTION RESPIRATORY (INHALATION) at 01:04

## 2019-04-19 RX ADMIN — THERA TABS 1 TABLET: TAB at 09:04

## 2019-04-19 RX ADMIN — SODIUM POLYSTYRENE SULFONATE 30 G: 1 POWDER ORAL; RECTAL at 09:04

## 2019-04-19 RX ADMIN — LEVALBUTEROL 1.25 MG: 1.25 SOLUTION, CONCENTRATE RESPIRATORY (INHALATION) at 01:04

## 2019-04-19 RX ADMIN — IPRATROPIUM BROMIDE AND ALBUTEROL SULFATE 3 ML: .5; 3 SOLUTION RESPIRATORY (INHALATION) at 08:04

## 2019-04-19 RX ADMIN — TIOTROPIUM BROMIDE 18 MCG: 18 CAPSULE ORAL; RESPIRATORY (INHALATION) at 12:04

## 2019-04-19 RX ADMIN — IPRATROPIUM BROMIDE AND ALBUTEROL SULFATE 3 ML: .5; 3 SOLUTION RESPIRATORY (INHALATION) at 01:04

## 2019-04-19 RX ADMIN — PREDNISONE 40 MG: 20 TABLET ORAL at 09:04

## 2019-04-19 RX ADMIN — FUROSEMIDE 20 MG: 20 TABLET ORAL at 12:04

## 2019-04-19 NOTE — MEDICAL/APP STUDENT
Hospital Medicine  Progress note    Team: [facility] MED B Dc Gonzalez  Admit Date: 4/18/2019  MARYANN   Length of Stay:  LOS: 1 day   Code status: Full Code    Principal Problem:  COPD exacerbation    Overview:  86 y/o WF with history of COPD and dementia presents to the ED with her daughter with SOB.    Interval hx: Patient was ok last night. Potassium 5.3 given kayexalate. PT/OT consulted. Procal came back negative and sinus tachy. Echo and BNP ordered, BNP at 376. Lasix at 20mg daily PO.     ROS     Constitutional: Positive for appetite change (decreased) and fatigue. Negative for chills and fever.   HENT: Negative for congestion, rhinorrhea and sore throat.    Eyes: Negative for photophobia and visual disturbance.   Respiratory: Positive for shortness of breath. Negative for cough.    Cardiovascular: Negative for chest pain and leg swelling.   Gastrointestinal: Positive for diarrhea and nausea. Negative for abdominal pain, constipation and vomiting.   Genitourinary: Negative for dysuria and hematuria.   Musculoskeletal: Negative for neck pain and neck stiffness.   Skin: Negative for rash.   Neurological: Positive for weakness (generalized). Negative for light-headedness, numbness and headaches.   Psychiatric/Behavioral: Positive for confusion (h/o Alzheimers).       PEx  Temp:  [97.3 °F (36.3 °C)]   Pulse:  []   Resp:  [16-35]   BP: ()/(51-77)   SpO2:  [89 %-99 %]     Intake/Output Summary (Last 24 hours) at 4/19/2019 1111  Last data filed at 4/19/2019 0600  Gross per 24 hour   Intake 500 ml   Output 0 ml   Net 500 ml     Constitutional: She appears well-developed and well-nourished. She is not diaphoretic. No distress.   HENT:   Head: Normocephalic and atraumatic.   Neck: Normal range of motion. Neck supple.   Cardiovascular: Regular rhythm and normal heart sounds. Tachycardia present.  Exam reveals no gallop and no friction rub.    No murmur heard.  No LE edema   Pulmonary/Chest: Breath sounds normal.  No respiratory distress. She has no wheezes. She has no rhonchi. She has no rales. She exhibits no tenderness.   On supplemental oxygen via nasal cannula.    Abdominal: Soft. Bowel sounds are normal. There is no tenderness. There is no rebound and no guarding.   Musculoskeletal: Normal range of motion.   Neurological: She is alert and oriented to person, place, and time.   Skin: Skin is warm and dry. No erythema.  Calcium deposit on the left anterior shin  Psychiatric: She has a normal mood and affect.    Recent Labs   Lab 04/18/19  1145 04/19/19  0449   WBC 13.00* 11.84   HGB 14.3 11.7*   HCT 42.9 35.9*    163     Recent Labs   Lab 04/18/19  1145 04/18/19  1501 04/19/19  0449     --  136   K 4.8  --  5.3*   CL 98  --  102   CO2 27  --  24   BUN 19  --  20   CREATININE 1.0  --  0.8   *  --  141*   CALCIUM 9.9  --  8.6*   MG  --   --  2.0   PHOS  --   --  4.2   LIPASE  --  45  --      Recent Labs   Lab 04/18/19  1145 04/19/19  0449   ALKPHOS 167* 125   ALT 17 12   AST 38 24   ALBUMIN 3.0* 2.4*   PROT 8.2 6.5   BILITOT 1.1* 0.4        Recent Labs     04/18/19  1145 04/18/19  1501 04/18/19  1921   TROPONINI 0.073* 0.051* 0.039*     No results for input(s): POCTGLUCOSE in the last 168 hours.  Hemoglobin A1C   Date Value Ref Range Status   04/18/2019 5.4 4.0 - 5.6 % Final     Comment:     ADA Screening Guidelines:  5.7-6.4%  Consistent with prediabetes  >or=6.5%  Consistent with diabetes  High levels of fetal hemoglobin interfere with the HbA1C  assay. Heterozygous hemoglobin variants (HbS, HgC, etc)do  not significantly interfere with this assay.   However, presence of multiple variants may affect accuracy.         Scheduled Meds:   ipratropium  0.5 mg Nebulization Q6H    levalbuterol  1.25 mg Nebulization Q8H    multivitamin  1 tablet Oral Daily    predniSONE  40 mg Oral Daily    tiotropium  1 capsule Inhalation Daily    umeclidinium-vilanterol  1 puff Inhalation Daily     Continuous  Infusions:  As Needed:  acetaminophen, dextrose 50%, dextrose 50%, dextrose 50%, dextrose 50%, glucagon (human recombinant), glucose, glucose, glucose, ondansetron, sodium chloride 0.9%    Active Hospital Problems    Diagnosis  POA    *COPD exacerbation [J44.1]  Yes     Severe obstruction per pft in 2004.  Would not be able to perform pft due to memory issue.  Recommend resumption of anora in addition to symbicort.  Per daughter, she does not wish for intubation or cpr in the case of cardiopulmonary arrest.  Daughter is hesitant about palliative care referral for advance care planning.  Patient is up to date with vaccination.        Hyperkalemia [E87.5]  Yes    Hypoxia [R09.02]  Yes    Celiac disease [K90.0]  Yes    Dementia [F03.90]  Yes      Resolved Hospital Problems   No resolved problems to display.         EKG - Normal sinus rhythm @ 99bm   Prolonged QT  Assessment and Plan:     Hypoxia  COPD exacerbation  - O2 at home used as needed, currently on 2L NC  - Prednisone started  - Duonebs q4hrs  - Lactic acid normal  - Troponin elevated but trending down 0.07-->0.05     Leukocytosis  - WBC count 13  - Check tomorrow for choice on antibiotics  - received ceftriaxone and azithromycin in the emergency department  - obtain procalcitonin     Dementia in Alzheimer's disease  - No current issues  - Patient Ox2  - not interested in donepezil.       CKD - stage III  - Appears to be at baseline     Calcium deposit on the left anterior  Previously seen by Plastic surgery and operated firs subcutaneous deposits.  No active issues     Celiac disease  Continue with gluten free diet     Discharge plan: Home health    I personally scribed for Buddy Alicea MD on 04/19/2019 at 1:43 PM. Electronically signed by hugh Gonzalez III on 04/19/2019 at 1:43 PM

## 2019-04-19 NOTE — PLAN OF CARE
Problem: Adult Inpatient Plan of Care  Goal: Plan of Care Review  Outcome: Ongoing (interventions implemented as appropriate)  Pt in bed sleeping. No noted respiratory distress or discomfort. Oxygen 2L nc comfort measures. O2 sats 95%. Vss remained afebrile. Daughter at bedside throughout the night. Safety measures maintained.

## 2019-04-19 NOTE — PT/OT/SLP EVAL
Occupational Therapy   Evaluation    Name: Zeynep Hamm  MRN: [id number]  Admitting Diagnosis:  COPD exacerbation      Recommendations:     Discharge Recommendations: home health OT  Discharge Equipment Recommendations:  none, other (see comments)(Assistance to accomodate shower on the 2nd floor)  Barriers to discharge:  Inaccessible home environment    Assessment:     Zeynep Hamm is a 87 y.o. female with a medical diagnosis of COPD exacerbation.  She presents with poor immediate and short term memory, poor endurance, and generalized weakness that inhibits her functional performance. Performance deficits affecting function: weakness, impaired endurance, impaired cardiopulmonary response to activity.      Rehab Prognosis: Good; patient would benefit from acute skilled OT services to address these deficits and reach maximum level of function.       Plan:     Patient to be seen 2 x/week to address the above listed problems via self-care/home management, therapeutic activities, therapeutic exercises  · Plan of Care Expires: 04/26/19  · Plan of Care Reviewed with:      Subjective     Chief Complaint: SOB  Patient/Family Comments/goals: Medical management and discharge.     Occupational Profile:  Living Environment: Lives w/ adult son in a 2SH w/ 0 PEGGY, and bedroom on the 1st flr and shower on the second floor.   Previous level of function: Required Min A (mostly for v/c, completion, and safety) to complete most w/ ADLs/IADLs Mod A for Bathing on the 2nd floor.   Roles and Routines: Likes to run errands.   Equipment Used at Home:  oxygen, wheelchair, rollator, shower chair  Assistance upon Discharge: Yes from family.    Pain/Comfort:  · Pain Rating 1: 0/10  · Pain Rating Post-Intervention 1: 0/10    Patients cultural, spiritual, Moravian conflicts given the current situation: no    Objective:     Communicated with: RN prior to session.  Patient found HOB elevated with telemetry, pulse ox (continuous), peripheral  IV, oxygen upon OT entry to room.    General Precautions: Standard, fall   Orthopedic Precautions:N/A   Braces: N/A     Occupational Performance:    Bed Mobility:    · Patient completed Rolling/Turning to Left with  supervision  · Patient completed Rolling/Turning to Right with supervision  · Patient completed Scooting/Bridging with supervision  · Patient completed Supine to Sit with supervision  · Patient completed Sit to Supine with supervision    Functional Mobility/Transfers:  · Patient completed Sit <> Stand Transfer with supervision  with  no assistive device   · Functional Mobility: Able to take steps to the HOB.     Activities of Daily Living:  · Upper Body Dressing: stand by assistance to don gown as a jacket w/ setup.    Cognitive/Visual Perceptual:  Cognitive/Psychosocial Skills:     -       Oriented to: Person and Place   -       Follows Commands/attention:Easily distracted and Follows one-step commands  -       Communication: clear/fluent  -       Memory: Impaired STM and Poor immediate recall  -       Safety awareness/insight to disability: intact   -       Mood/Affect/Coping skills/emotional control: Appropriate to situation  Visual/Perceptual:      -Intact  no glasses worn or present.    Physical Exam:  Balance:    -       grossly good  Dominant hand:    -       RH  Upper Extremity Range of Motion:     -       Right Upper Extremity: WFL  -       Left Upper Extremity: WFL  Upper Extremity Strength:    -       Right Upper Extremity: WFL  -       Left Upper Extremity: WFL   Strength:    -       Right Upper Extremity: WFL  -       Left Upper Extremity: WFL  Fine Motor Coordination:    -       Intact  Gross motor coordination:   WFL    AMPAC 6 Click ADL:  AMPAC Total Score: 24    Treatment & Education:  -Pt edu on OT role/POC  -Importance of OOB activity with staff assistance  -Safety during functional t/f and mobility  - White board updated  - Multiple self care tasks/functional mobility completed--  assistance level noted above  - All questions/concerns answered within OT scope of practice    Session was interrupted by EKG tech w/ a stat order.  Education:    Patient left HOB elevated with all lines intact, call button in reach, RN notified and daughter, and EKG tech present    GOALS:   Multidisciplinary Problems     Occupational Therapy Goals        Problem: Occupational Therapy Goal    Goal Priority Disciplines Outcome Interventions   Occupational Therapy Goal     OT, PT/OT Ongoing (interventions implemented as appropriate)    Description:  Goals to be met by: 4/26/2019     Patient will increase functional independence with ADLs by performing:    UE Dressing with Set-up Assistance.  LE Dressing with Set-up Assistance.  Grooming while standing at sink with Set-up Assistance.  Bathing from  standing at sink with Set-up Assistance.                      History:     Past Medical History:   Diagnosis Date    Alzheimer disease     Bronchiectasis     Cataract     both eyes    Celiac disease     Emphysema of lung     Encounter for blood transfusion     Lung disease     Short-term memory loss        Past Surgical History:   Procedure Laterality Date    APPENDECTOMY      CATARACT EXTRACTION W/  INTRAOCULAR LENS IMPLANT Right 4/14/14    CATARACT EXTRACTION W/  INTRAOCULAR LENS IMPLANT Left 5/12/14    EXCISION-MASS Left 10/31/2013    Performed by SAL Morgan III, MD at [facility] 2ND FLR    EXCISION-SOFT TISSUE - right shin calcium deposits Right 8/19/2015    Performed by SAL Morgan III, MD at [facility] 2ND FLR    INSERTION, IOL PROSTHESIS Left 5/12/2014    Performed by Roverto Flores MD at [facility] [place]    INSERTION, IOL PROSTHESIS Right 4/14/2014    Performed by Roverto Flores MD at [facility] [place]    PHACOEMULSIFICATION, CATARACT Left 5/12/2014    Performed by Roverto Flores MD at [facility] [place]    PHACOEMULSIFICATION, CATARACT Right 4/14/2014    Performed by Roverto Flores MD at [facility] [place]    TONSILLECTOMY       uterine suspension         Time Tracking:     OT Date of Treatment: 04/19/19  OT Start Time: 1051  OT Stop Time: 1112  OT Total Time (min): 21 min    Billable Minutes:Evaluation 10  Therapeutic Activity 11    Tc Flaherty, OT  4/19/2019

## 2019-04-19 NOTE — PROGRESS NOTES
Hospital Medicine  Progress note     Team: [facility] MED B Dc Gonzalez  Admit Date: 4/18/2019  MARYANN   Length of Stay:  LOS: 1 day   Code status: Full Code     Principal Problem:  COPD exacerbation     Overview:  86 y/o WF with history of COPD and dementia presents to the ED with her daughter with SOB.     Interval hx: Patient was ok last night. Potassium 5.3 given kayexalate. PT/OT consulted. Procal came back negative and sinus tachy.  Nebulizations changed to levalbuterol and ipratropium.  Echo and BNP ordered, BNP at 376. Lasix at 20mg daily PO.      ROS      Constitutional: Positive for appetite change (decreased) and fatigue. Negative for chills and fever.   HENT: Negative for congestion, rhinorrhea and sore throat.    Eyes: Negative for photophobia and visual disturbance.   Respiratory: Positive for shortness of breath. Negative for cough.    Cardiovascular: Negative for chest pain and leg swelling.   Gastrointestinal: Positive for diarrhea and nausea. Negative for abdominal pain, constipation and vomiting.   Genitourinary: Negative for dysuria and hematuria.   Musculoskeletal: Negative for neck pain and neck stiffness.   Skin: Negative for rash.   Neurological: Positive for weakness (generalized). Negative for light-headedness, numbness and headaches.   Psychiatric/Behavioral: Positive for confusion (h/o Alzheimers).         PEx  Temp:  [97.3 °F (36.3 °C)]   Pulse:  []   Resp:  [16-35]   BP: ()/(51-77)   SpO2:  [89 %-99 %]      Intake/Output Summary (Last 24 hours) at 4/19/2019 1111  Last data filed at 4/19/2019 0600      Gross per 24 hour   Intake 500 ml   Output 0 ml   Net 500 ml      Constitutional: She appears well-developed and well-nourished. She is not diaphoretic. No distress.   HENT:   Head: Normocephalic and atraumatic.   Neck: Normal range of motion. Neck supple.   Cardiovascular: Regular rhythm and normal heart sounds. Tachycardia present.  Exam reveals no gallop and no friction  rub.    No murmur heard.  No LE edema   Pulmonary/Chest: Breath sounds normal. No respiratory distress. She has no wheezes. She has no rhonchi. She has no rales. She exhibits no tenderness.   On supplemental oxygen via nasal cannula.    Abdominal: Soft. Bowel sounds are normal. There is no tenderness. There is no rebound and no guarding.   Musculoskeletal: Normal range of motion.   Neurological: She is alert and oriented to person, place, and time.   Skin: Skin is warm and dry. No erythema.  Calcium deposit on the left anterior shin  Psychiatric: She has a normal mood and affect.          Recent Labs   Lab 04/18/19  1145 04/19/19  0449   WBC 13.00* 11.84   HGB 14.3 11.7*   HCT 42.9 35.9*    163            Recent Labs   Lab 04/18/19  1145 04/18/19  1501 04/19/19  0449     --  136   K 4.8  --  5.3*   CL 98  --  102   CO2 27  --  24   BUN 19  --  20   CREATININE 1.0  --  0.8   *  --  141*   CALCIUM 9.9  --  8.6*   MG  --   --  2.0   PHOS  --   --  4.2   LIPASE  --  45  --            Recent Labs   Lab 04/18/19  1145 04/19/19  0449   ALKPHOS 167* 125   ALT 17 12   AST 38 24   ALBUMIN 3.0* 2.4*   PROT 8.2 6.5   BILITOT 1.1* 0.4               Recent Labs     04/18/19  1145 04/18/19  1501 04/18/19  1921   TROPONINI 0.073* 0.051* 0.039*      No results for input(s): POCTGLUCOSE in the last 168 hours.          Hemoglobin A1C   Date Value Ref Range Status   04/18/2019 5.4 4.0 - 5.6 % Final       Comment:       ADA Screening Guidelines:  5.7-6.4%  Consistent with prediabetes  >or=6.5%  Consistent with diabetes  High levels of fetal hemoglobin interfere with the HbA1C  assay. Heterozygous hemoglobin variants (HbS, HgC, etc)do  not significantly interfere with this assay.   However, presence of multiple variants may affect accuracy.            Scheduled Meds:   ipratropium  0.5 mg Nebulization Q6H    levalbuterol  1.25 mg Nebulization Q8H    multivitamin  1 tablet Oral Daily    predniSONE  40 mg Oral Daily     tiotropium  1 capsule Inhalation Daily    umeclidinium-vilanterol  1 puff Inhalation Daily      Continuous Infusions:  As Needed:  acetaminophen, dextrose 50%, dextrose 50%, dextrose 50%, dextrose 50%, glucagon (human recombinant), glucose, glucose, glucose, ondansetron, sodium chloride 0.9%            Active Hospital Problems     Diagnosis   POA    *COPD exacerbation [J44.1]   Yes       Severe obstruction per pft in 2004.  Would not be able to perform pft due to memory issue.  Recommend resumption of anora in addition to symbicort.  Per daughter, she does not wish for intubation or cpr in the case of cardiopulmonary arrest.  Daughter is hesitant about palliative care referral for advance care planning.  Patient is up to date with vaccination.         Hyperkalemia [E87.5]   Yes    Hypoxia [R09.02]   Yes    Celiac disease [K90.0]   Yes    Dementia [F03.90]   Yes       Resolved Hospital Problems   No resolved problems to display.            EKG - Normal sinus rhythm @ 99bm   Prolonged QT  Assessment and Plan:     Hypoxia  COPD exacerbation  - O2 at home used as needed, currently on 2L NC  - Prednisone started  - Duonebs q4hrs  - Lactic acid normal  - Troponin elevated but trending down 0.07-->0.05    Hyperkalemia 5.3  Received Kayexalate repeat CMP in the evening    Elevated BNP  376.  Echocardiogram ordered.  Lasix 20 mg p.o. daily.  Strict input output monitor     Leukocytosis  -resolved   - Check tomorrow for choice on antibiotics  - received ceftriaxone and azithromycin in the emergency department   procalcitonin negative.  Will hold antibiotics for     Dementia in Alzheimer's disease  - No current issues  - Patient Ox2  - not interested in donepezil.       CKD - stage III  - Appears to be at baseline     Calcium deposit on the left anterior  Previously seen by Plastic surgery and operated firs subcutaneous deposits.  No active issues     Celiac disease  Continue with gluten free diet     Discharge plan:  Home health.  PT/OT evaluation pending     I personally scribed for Buddy Alicea MD on 04/19/2019 at 1:43 PM. Electronically signed by hugh Gonzalez III on 04/19/2019 at 1:43 PM  The documentation recorded by the scribe accurately reflects service I personally performed and the decisions made by me.  Buddy Alicea MD  Attending Staff Physician  [facility] Medicine  pager- [phone]  [facility] - [phone]

## 2019-04-19 NOTE — PLAN OF CARE
Problem: Occupational Therapy Goal  Goal: Occupational Therapy Goal  Goals to be met by: 4/26/2019     Patient will increase functional independence with ADLs by performing:    UE Dressing with Set-up Assistance.  LE Dressing with Set-up Assistance.  Grooming while standing at sink with Set-up Assistance.  Bathing from  standing at sink with Set-up Assistance.    Outcome: Ongoing (interventions implemented as appropriate)  Eval complete. Pt tolerated session well. Initiate OT POC.

## 2019-04-20 VITALS
OXYGEN SATURATION: 95 % | BODY MASS INDEX: 19.29 KG/M2 | HEART RATE: 92 BPM | SYSTOLIC BLOOD PRESSURE: 100 MMHG | HEIGHT: 64 IN | RESPIRATION RATE: 20 BRPM | DIASTOLIC BLOOD PRESSURE: 70 MMHG | WEIGHT: 113 LBS | TEMPERATURE: 97 F

## 2019-04-20 PROBLEM — I50.41 ACUTE COMBINED SYSTOLIC AND DIASTOLIC HEART FAILURE: Status: ACTIVE | Noted: 2019-04-20

## 2019-04-20 PROBLEM — R09.02 HYPOXIA: Status: RESOLVED | Noted: 2019-04-18 | Resolved: 2019-04-20

## 2019-04-20 PROBLEM — I50.9 CONGESTIVE HEART FAILURE (CHF): Status: ACTIVE | Noted: 2019-04-20

## 2019-04-20 PROBLEM — E87.5 HYPERKALEMIA: Status: RESOLVED | Noted: 2019-04-19 | Resolved: 2019-04-20

## 2019-04-20 LAB
ALBUMIN SERPL BCP-MCNC: 2.6 G/DL (ref 3.5–5.2)
ALP SERPL-CCNC: 115 U/L (ref 55–135)
ALT SERPL W/O P-5'-P-CCNC: 12 U/L (ref 10–44)
ANION GAP SERPL CALC-SCNC: 10 MMOL/L (ref 8–16)
ASCENDING AORTA: 3.84 CM
AST SERPL-CCNC: 22 U/L (ref 10–40)
AV INDEX (PROSTH): 0.55
AV MEAN GRADIENT: 2.9 MMHG
AV PEAK GRADIENT: 5.86 MMHG
AV VELOCITY RATIO: 0.5
BASOPHILS # BLD AUTO: 0.02 K/UL (ref 0–0.2)
BASOPHILS NFR BLD: 0.1 % (ref 0–1.9)
BILIRUB SERPL-MCNC: 0.3 MG/DL (ref 0.1–1)
BSA FOR ECHO PROCEDURE: 1.52 M2
BUN SERPL-MCNC: 20 MG/DL (ref 8–23)
CALCIUM SERPL-MCNC: 8.6 MG/DL (ref 8.7–10.5)
CHLORIDE SERPL-SCNC: 100 MMOL/L (ref 95–110)
CO2 SERPL-SCNC: 23 MMOL/L (ref 23–29)
CREAT SERPL-MCNC: 0.9 MG/DL (ref 0.5–1.4)
CV ECHO LV RWT: 0.35 CM
DIFFERENTIAL METHOD: ABNORMAL
DOP CALC AO PEAK VEL: 1.21 M/S
DOP CALC AO VTI: 24.57 CM
DOP CALC LVOT PEAK VEL: 0.6 M/S
DOP CALCLVOT PEAK VEL VTI: 13.63 CM
E WAVE DECELERATION TIME: 123.1 MSEC
E/A RATIO: 0.6
E/E' RATIO: 4.38
ECHO LV POSTERIOR WALL: 0.72 CM (ref 0.6–1.1)
EOSINOPHIL # BLD AUTO: 0 K/UL (ref 0–0.5)
EOSINOPHIL NFR BLD: 0 % (ref 0–8)
ERYTHROCYTE [DISTWIDTH] IN BLOOD BY AUTOMATED COUNT: 14.3 % (ref 11.5–14.5)
EST. GFR  (AFRICAN AMERICAN): >60 ML/MIN/1.73 M^2
EST. GFR  (NON AFRICAN AMERICAN): 57.7 ML/MIN/1.73 M^2
FRACTIONAL SHORTENING: 29 % (ref 28–44)
GLUCOSE SERPL-MCNC: 118 MG/DL (ref 70–110)
HCT VFR BLD AUTO: 37.9 % (ref 37–48.5)
HGB BLD-MCNC: 12.1 G/DL (ref 12–16)
IMM GRANULOCYTES # BLD AUTO: 0.11 K/UL (ref 0–0.04)
IMM GRANULOCYTES NFR BLD AUTO: 0.6 % (ref 0–0.5)
INTERVENTRICULAR SEPTUM: 0.64 CM (ref 0.6–1.1)
IVRT: 0.09 MSEC
LA MAJOR: 4.19 CM
LA MINOR: 4.59 CM
LA WIDTH: 3.38 CM
LEFT ATRIUM SIZE: 2.87 CM
LEFT ATRIUM VOLUME INDEX: 23.5 ML/M2
LEFT ATRIUM VOLUME: 36.12 CM3
LEFT INTERNAL DIMENSION IN SYSTOLE: 2.94 CM (ref 2.1–4)
LEFT VENTRICLE DIASTOLIC VOLUME INDEX: 49.72 ML/M2
LEFT VENTRICLE DIASTOLIC VOLUME: 76.3 ML
LEFT VENTRICLE MASS INDEX: 52.3 G/M2
LEFT VENTRICLE SYSTOLIC VOLUME INDEX: 21.8 ML/M2
LEFT VENTRICLE SYSTOLIC VOLUME: 33.38 ML
LEFT VENTRICULAR INTERNAL DIMENSION IN DIASTOLE: 4.15 CM (ref 3.5–6)
LEFT VENTRICULAR MASS: 80.31 G
LV LATERAL E/E' RATIO: 4.18
LV SEPTAL E/E' RATIO: 4.6
LYMPHOCYTES # BLD AUTO: 0.6 K/UL (ref 1–4.8)
LYMPHOCYTES NFR BLD: 3.7 % (ref 18–48)
MAGNESIUM SERPL-MCNC: 2 MG/DL (ref 1.6–2.6)
MCH RBC QN AUTO: 28.1 PG (ref 27–31)
MCHC RBC AUTO-ENTMCNC: 31.9 G/DL (ref 32–36)
MCV RBC AUTO: 88 FL (ref 82–98)
MONOCYTES # BLD AUTO: 1 K/UL (ref 0.3–1)
MONOCYTES NFR BLD: 5.5 % (ref 4–15)
MV PEAK A VEL: 0.77 M/S
MV PEAK E VEL: 0.46 M/S
NEUTROPHILS # BLD AUTO: 15.8 K/UL (ref 1.8–7.7)
NEUTROPHILS NFR BLD: 90.1 % (ref 38–73)
NRBC BLD-RTO: 0 /100 WBC
PHOSPHATE SERPL-MCNC: 4 MG/DL (ref 2.7–4.5)
PISA TR MAX VEL: 2.89 M/S
PLATELET # BLD AUTO: 193 K/UL (ref 150–350)
PMV BLD AUTO: 10.7 FL (ref 9.2–12.9)
POTASSIUM SERPL-SCNC: 4.4 MMOL/L (ref 3.5–5.1)
PROT SERPL-MCNC: 6.7 G/DL (ref 6–8.4)
RA MAJOR: 3.99 CM
RA PRESSURE: 3 MMHG
RA WIDTH: 3.48 CM
RBC # BLD AUTO: 4.3 M/UL (ref 4–5.4)
RETIRED EF AND QEF - SEE NOTES: 43 %
RIGHT VENTRICULAR END-DIASTOLIC DIMENSION: 4 CM
SINUS: 3.36 CM
SODIUM SERPL-SCNC: 133 MMOL/L (ref 136–145)
STJ: 3.05 CM
TDI LATERAL: 0.11
TDI SEPTAL: 0.1
TDI: 0.11
TR MAX PG: 33.41 MMHG
TRICUSPID ANNULAR PLANE SYSTOLIC EXCURSION: 1.79 CM
TV REST PULMONARY ARTERY PRESSURE: 36 MMHG
WBC # BLD AUTO: 17.5 K/UL (ref 3.9–12.7)

## 2019-04-20 PROCEDURE — 25000003 PHARM REV CODE 250: Mod: HCNC | Performed by: HOSPITALIST

## 2019-04-20 PROCEDURE — 99233 PR SUBSEQUENT HOSPITAL CARE,LEVL III: ICD-10-PCS | Mod: HCNC,GC,, | Performed by: INTERNAL MEDICINE

## 2019-04-20 PROCEDURE — 80053 COMPREHEN METABOLIC PANEL: CPT | Mod: HCNC

## 2019-04-20 PROCEDURE — 36415 COLL VENOUS BLD VENIPUNCTURE: CPT | Mod: HCNC

## 2019-04-20 PROCEDURE — 94761 N-INVAS EAR/PLS OXIMETRY MLT: CPT | Mod: HCNC

## 2019-04-20 PROCEDURE — 27000221 HC OXYGEN, UP TO 24 HOURS: Mod: HCNC

## 2019-04-20 PROCEDURE — 99239 HOSP IP/OBS DSCHRG MGMT >30: CPT | Mod: HCNC,,, | Performed by: HOSPITALIST

## 2019-04-20 PROCEDURE — 99233 SBSQ HOSP IP/OBS HIGH 50: CPT | Mod: HCNC,GC,, | Performed by: INTERNAL MEDICINE

## 2019-04-20 PROCEDURE — 85025 COMPLETE CBC W/AUTO DIFF WBC: CPT | Mod: HCNC

## 2019-04-20 PROCEDURE — 25000242 PHARM REV CODE 250 ALT 637 W/ HCPCS: Mod: HCNC | Performed by: HOSPITALIST

## 2019-04-20 PROCEDURE — 84100 ASSAY OF PHOSPHORUS: CPT | Mod: HCNC

## 2019-04-20 PROCEDURE — 94640 AIRWAY INHALATION TREATMENT: CPT | Mod: HCNC

## 2019-04-20 PROCEDURE — 97161 PT EVAL LOW COMPLEX 20 MIN: CPT | Mod: HCNC

## 2019-04-20 PROCEDURE — 63600175 PHARM REV CODE 636 W HCPCS: Mod: HCNC | Performed by: HOSPITALIST

## 2019-04-20 PROCEDURE — 99239 PR HOSPITAL DISCHARGE DAY,>30 MIN: ICD-10-PCS | Mod: HCNC,,, | Performed by: HOSPITALIST

## 2019-04-20 PROCEDURE — 83735 ASSAY OF MAGNESIUM: CPT | Mod: HCNC

## 2019-04-20 RX ORDER — FUROSEMIDE 20 MG/1
20 TABLET ORAL
Qty: 30 TABLET | Refills: 11 | Status: SHIPPED | OUTPATIENT
Start: 2019-04-20 | End: 2019-04-20

## 2019-04-20 RX ORDER — FUROSEMIDE 20 MG/1
20 TABLET ORAL
Qty: 30 TABLET | Refills: 11 | Status: ON HOLD | OUTPATIENT
Start: 2019-04-20 | End: 2019-05-08 | Stop reason: HOSPADM

## 2019-04-20 RX ORDER — ACETAMINOPHEN 325 MG/1
650 TABLET ORAL EVERY 6 HOURS PRN
Refills: 0 | Status: ON HOLD | COMMUNITY
Start: 2019-04-20 | End: 2019-05-05 | Stop reason: HOSPADM

## 2019-04-20 RX ORDER — IPRATROPIUM BROMIDE AND ALBUTEROL SULFATE 2.5; .5 MG/3ML; MG/3ML
3 SOLUTION RESPIRATORY (INHALATION) EVERY 6 HOURS PRN
Qty: 1 BOX | Refills: 0 | Status: SHIPPED | OUTPATIENT
Start: 2019-04-20 | End: 2020-04-19

## 2019-04-20 RX ORDER — PREDNISONE 20 MG/1
40 TABLET ORAL DAILY
Qty: 4 TABLET | Refills: 0 | Status: SHIPPED | OUTPATIENT
Start: 2019-04-21 | End: 2019-04-23

## 2019-04-20 RX ADMIN — THERA TABS 1 TABLET: TAB at 09:04

## 2019-04-20 RX ADMIN — PREDNISONE 40 MG: 20 TABLET ORAL at 09:04

## 2019-04-20 RX ADMIN — IPRATROPIUM BROMIDE 0.5 MG: 0.5 SOLUTION RESPIRATORY (INHALATION) at 07:04

## 2019-04-20 RX ADMIN — LEVALBUTEROL 1.25 MG: 1.25 SOLUTION, CONCENTRATE RESPIRATORY (INHALATION) at 02:04

## 2019-04-20 RX ADMIN — IPRATROPIUM BROMIDE 0.5 MG: 0.5 SOLUTION RESPIRATORY (INHALATION) at 01:04

## 2019-04-20 RX ADMIN — LEVALBUTEROL 1.25 MG: 1.25 SOLUTION, CONCENTRATE RESPIRATORY (INHALATION) at 07:04

## 2019-04-20 RX ADMIN — TIOTROPIUM BROMIDE 18 MCG: 18 CAPSULE ORAL; RESPIRATORY (INHALATION) at 09:04

## 2019-04-20 RX ADMIN — IPRATROPIUM BROMIDE 0.5 MG: 0.5 SOLUTION RESPIRATORY (INHALATION) at 02:04

## 2019-04-20 RX ADMIN — LEVALBUTEROL 1.25 MG: 1.25 SOLUTION, CONCENTRATE RESPIRATORY (INHALATION) at 01:04

## 2019-04-20 RX ADMIN — FUROSEMIDE 20 MG: 20 TABLET ORAL at 09:04

## 2019-04-20 NOTE — DISCHARGE SUMMARY
Discharge Summary  Hospital Medicine     Attending Provider on Discharge: [facility] Medicine Team: [facility]  Date of Admission:  4/18/2019     Date of Discharge:    Length of Stay:  LOS: 2 days   Code status: Full Code            Active Hospital Problems     Diagnosis   POA    *COPD exacerbation [J44.1]   Yes       Severe obstruction per pft in 2004.  Would not be able to perform pft due to memory issue.  Recommend resumption of anora in addition to symbicort.  Per daughter, she does not wish for intubation or cpr in the case of cardiopulmonary arrest.  Daughter is hesitant about palliative care referral for advance care planning.  Patient is up to date with vaccination.         Hyperkalemia [E87.5]   Yes    Elevated brain natriuretic peptide (BNP) level [R79.89]   Yes    Hypoxia [R09.02]   Yes    Celiac disease [K90.0]   Yes    Dementia [F03.90]   Yes       Resolved Hospital Problems   No resolved problems to display.         HPI  88 y/o WF with history of COPD and dementia presents to the ED with her daughter with SOB. She has been having decreased appetite for the past 2 weeks. About 3 days ago she developed fatigue and generalized weakness. 2 days ago, she had worsening SOB and GARCIA. She is on PRN supplemental oxygen and for the past 2 days they have increased her oxygen use. She has been using at home albuterol with no relief. Reports nausea without vomiting, diarrhea, dark colored urine. Denies fever, chills, chest pain, abdominal pain, lightheadedness, LE edema, URI symptoms.      Patient lives in [place] and follows up with pulmonology, last visit was 4 months ago. Prior smoking history from [age] 2pk/day.      Hospital Course  88 y/o WF with history of COPD and dementia presents to the ED with her daughter with SOB. Potassium 5.3 given kayexalate. PT/OT consulted. Procal came back negative and sinus tachy. Echo and BNP ordered, BNP at 376. Lasix at 20mg daily PO. Patient discharged  home.        Hypoxia  COPD exacerbation  - O2 at home used as needed, currently on 2L NC  - Prednisone started  - Duonebs q4hrs  - Lactic acid normal  - Troponin elevated but trending down 0.07-->0.05    New onset systolic congestive heart failure  On echocardiogram EF of 45%.  Cardiology consulted.  Discussion with daughter did not want any aggressive measures.  Start on Lasix 20 mg as needed for weight gain.  Lisinopril and beta-blocker not being started after discussion with      Hyperkalemia 5.3  Received Kayexalate repeat CMP in the evening     Elevated BNP  376.  Echocardiogram ordered.  Lasix 20 mg p.o. dailyx 2 days. discontinued as daugter concerned that patient will not drink enough water  Strict input output monitor.      Leukocytosis  secondary to steroids  - Check tomorrow for choice on antibiotics  - received ceftriaxone and azithromycin in the emergency department   procalcitonin negative.  Will hold antibiotics     Dementia in Alzheimer's disease  - No current issues  - Patient Ox2  - not interested in donepezil.       CKD - stage III  - Appears to be at baseline     Calcium deposit on the left anterior  Previously seen by Plastic surgery and operated firs subcutaneous deposits.  No active issues     Celiac disease  Continue with gluten free diet    Discharged home with home health              Recent Labs   Lab 04/18/19  1145 04/19/19  0449 04/20/19  0538   WBC 13.00* 11.84 17.50*   HGB 14.3 11.7* 12.1   HCT 42.9 35.9* 37.9    163 193             Recent Labs   Lab 04/18/19  1501 04/19/19  0449 04/19/19  1532 04/20/19  0538   NA  --  136 135* 133*   K  --  5.3* 4.6 4.4   CL  --  102 98 100   CO2  --  24 29 23   BUN  --  20 20 20   CREATININE  --  0.8 0.8 0.9   GLU  --  141* 144* 118*   CALCIUM  --  8.6* 8.6* 8.6*   MG  --  2.0  --  2.0   PHOS  --  4.2  --  4.0   LIPASE 45  --   --   --             Recent Labs   Lab 04/18/19  1145 04/19/19  0449 04/20/19  0538   ALKPHOS 167* 125 115   ALT 17  12 12   AST 38 24 22   ALBUMIN 3.0* 2.4* 2.6*   PROT 8.2 6.5 6.7   BILITOT 1.1* 0.4 0.3            Recent Labs     04/18/19  1145 04/18/19  1501 04/18/19  1921   TROPONINI 0.073* 0.051* 0.039*          Recent Labs     04/18/19  1156   LACTATE 2.2         No results for input(s): POCTGLUCOSE in the last 168 hours.           Hemoglobin A1C   Date Value Ref Range Status   04/18/2019 5.4 4.0 - 5.6 % Final       Comment:       ADA Screening Guidelines:  5.7-6.4%  Consistent with prediabetes  >or=6.5%  Consistent with diabetes  High levels of fetal hemoglobin interfere with the HbA1C  assay. Heterozygous hemoglobin variants (HbS, HgC, etc)do  not significantly interfere with this assay.   However, presence of multiple variants may affect accuracy.            Procedures: TTE     Consultants: PT/OT: Evaluation and treatment          Current Discharge Medication List      Zeynep Hamm   Home Medication Instructions BEL:[id number]    Printed on:04/20/19 6619   Medication Information                      acetaminophen (TYLENOL) 325 MG tablet  Take 2 tablets (650 mg total) by mouth every 6 (six) hours as needed.             albuterol 90 mcg/actuation inhaler  Inhale 2 puffs into the lungs every 4 (four) hours as needed for Wheezing.             albuterol-ipratropium (DUO-NEB) 2.5 mg-0.5 mg/3 mL nebulizer solution  Take 3 mLs by nebulization every 6 (six) hours as needed for Wheezing or Shortness of Breath. Rescue             furosemide (LASIX) 20 MG tablet  Take 1 tablet (20 mg total) by mouth as needed. For Weight Gain > 2-3 lbs in 1 day or 4-6 lbs, please give lasix 20mg PO as needed             multivitamin capsule  Take 1 capsule by mouth once daily.             predniSONE (DELTASONE) 20 MG tablet  Take 2 tablets (40 mg total) by mouth once daily. for 2 days             SYMBICORT 160-4.5 mcg/actuation HFAA  2 (two) times daily.              umeclidinium-vilanterol (ANORO ELLIPTA) 62.5-25 mcg/actuation DsDv  Inhale 1  puff into the lungs once daily. Controller                 Discharge Diet:regular diet with Normal Fluid intake of 1500 - 2000 mL per day     Activity: activity as tolerated     Discharge Condition: Good     Disposition: Home or Self Care     Tests pending at the time of discharge: none      Time spent  on the discharge of the patient including review of hospital course with the patient. reviewing discharge medications and arranging follow-up care      Discharge examination Patient was seen and examined on the date of discharge and determined to be suitable for discharge.     Discharge plan      No future appointments.     I personally scribed for Buddy Alicea MD on 04/20/2019 at 11:19 AM. Electronically signed by hugh Gonzalez III on 04/20/2019 at 11:19 AM   The documentation recorded by the scribe accurately reflects service I personally performed and the decisions made by me.  Buddy Alicea MD  Attending Staff Physician  [facility] Medicine  pager- [phone]  [facility] - [phone]

## 2019-04-20 NOTE — MEDICAL/APP STUDENT
Discharge Summary  Hospital Medicine    Attending Provider on Discharge: [facility] Medicine Team: [facility] MED B  Date of Admission:  4/18/2019     Date of Discharge:    Length of Stay:  LOS: 2 days   Code status: Full Code    Active Hospital Problems    Diagnosis  POA    *COPD exacerbation [J44.1]  Yes     Severe obstruction per pft in 2004.  Would not be able to perform pft due to memory issue.  Recommend resumption of anora in addition to symbicort.  Per daughter, she does not wish for intubation or cpr in the case of cardiopulmonary arrest.  Daughter is hesitant about palliative care referral for advance care planning.  Patient is up to date with vaccination.        Hyperkalemia [E87.5]  Yes    Elevated brain natriuretic peptide (BNP) level [R79.89]  Yes    Hypoxia [R09.02]  Yes    Celiac disease [K90.0]  Yes    Dementia [F03.90]  Yes      Resolved Hospital Problems   No resolved problems to display.        HPI  86 y/o WF with history of COPD and dementia presents to the ED with her daughter with SOB. She has been having decreased appetite for the past 2 weeks. About 3 days ago she developed fatigue and generalized weakness. 2 days ago, she had worsening SOB and GARCIA. She is on PRN supplemental oxygen and for the past 2 days they have increased her oxygen use. She has been using at home albuterol with no relief. Reports nausea without vomiting, diarrhea, dark colored urine. Denies fever, chills, chest pain, abdominal pain, lightheadedness, LE edema, URI symptoms.      Patient lives in [place] and follows up with pulmonology, last visit was 4 months ago. Prior smoking history from [age] 2pk/day.     Hospital Course  86 y/o WF with history of COPD and dementia presents to the ED with her daughter with SOB. Potassium 5.3 given kayexalate. PT/OT consulted. Procal came back negative and sinus tachy. Echo and BNP ordered, BNP at 376. Lasix at 20mg daily PO. Patient discharged home.     Recent Labs    Lab 04/18/19  1145 04/19/19  0449 04/20/19  0538   WBC 13.00* 11.84 17.50*   HGB 14.3 11.7* 12.1   HCT 42.9 35.9* 37.9    163 193     Recent Labs   Lab 04/18/19  1501 04/19/19  0449 04/19/19  1532 04/20/19  0538   NA  --  136 135* 133*   K  --  5.3* 4.6 4.4   CL  --  102 98 100   CO2  --  24 29 23   BUN  --  20 20 20   CREATININE  --  0.8 0.8 0.9   GLU  --  141* 144* 118*   CALCIUM  --  8.6* 8.6* 8.6*   MG  --  2.0  --  2.0   PHOS  --  4.2  --  4.0   LIPASE 45  --   --   --      Recent Labs   Lab 04/18/19  1145 04/19/19  0449 04/20/19  0538   ALKPHOS 167* 125 115   ALT 17 12 12   AST 38 24 22   ALBUMIN 3.0* 2.4* 2.6*   PROT 8.2 6.5 6.7   BILITOT 1.1* 0.4 0.3      Recent Labs     04/18/19  1145 04/18/19  1501 04/18/19  1921   TROPONINI 0.073* 0.051* 0.039*     Recent Labs     04/18/19  1156   LACTATE 2.2       No results for input(s): POCTGLUCOSE in the last 168 hours.   Hemoglobin A1C   Date Value Ref Range Status   04/18/2019 5.4 4.0 - 5.6 % Final     Comment:     ADA Screening Guidelines:  5.7-6.4%  Consistent with prediabetes  >or=6.5%  Consistent with diabetes  High levels of fetal hemoglobin interfere with the HbA1C  assay. Heterozygous hemoglobin variants (HbS, HgC, etc)do  not significantly interfere with this assay.   However, presence of multiple variants may affect accuracy.         Procedures: TTE    Consultants: PT/OT: Evaluation and treatment    Current Discharge Medication List      CONTINUE these medications which have NOT CHANGED    Details   albuterol 90 mcg/actuation inhaler Inhale 2 puffs into the lungs every 4 (four) hours as needed for Wheezing.  Qty: 1 each, Refills: 12    Associated Diagnoses: Chronic obstructive pulmonary disease, unspecified COPD type      multivitamin capsule Take 1 capsule by mouth once daily.      SYMBICORT 160-4.5 mcg/actuation HFAA 2 (two) times daily.       umeclidinium-vilanterol (ANORO ELLIPTA) 62.5-25 mcg/actuation DsDv Inhale 1 puff into the lungs once  daily. Controller  Qty: 1 each, Refills: 5    Associated Diagnoses: Chronic obstructive pulmonary disease, unspecified COPD type      donepezil (ARICEPT) 5 MG tablet Take 1 tablet (5 mg total) by mouth once daily.  Qty: 30 tablet, Refills: 11      IBUPROFEN (ADVIL ORAL) Take 1 tablet by mouth as needed.      influenza (FLUZONE HIGH-DOSE) 180 mcg/0.5 mL vaccine Inject into the muscle.  Qty: 0.5 mL, Refills: 0             Discharge Diet:regular diet with Normal Fluid intake of 1500 - 2000 mL per day    Activity: activity as tolerated    Discharge Condition: Good    Disposition: Home-Health Care Svc    Tests pending at the time of discharge: none      Time spent  on the discharge of the patient including review of hospital course with the patient. reviewing discharge medications and arranging follow-up care     Discharge examination Patient was seen and examined on the date of discharge and determined to be suitable for discharge.    Discharge plan     No future appointments.    I personally scribed for Buddy Alicea MD on 04/20/2019 at 11:19 AM. Electronically signed by hugh Gonzalez III on 04/20/2019 at 11:19 AM

## 2019-04-20 NOTE — CONSULTS
[facility]  Cardiology  Consult Note    Patient Name: Zeynep Hamm  MRN: [id number]  Admission Date: 4/18/2019  Hospital Length of Stay: 2 days  Code Status: Full Code   Attending Provider: Buddy Alicea MD   Consulting Provider: Megan Corona MD  Primary Care Physician: Maya Kelley MD  Principal Problem:COPD exacerbation    Patient information was obtained from patient and ER records.     Inpatient consult to Cardiology  Consult performed by: Megan Corona MD  Consult ordered by: Buddy Alicea MD        Subjective:     Chief Complaint:  Low EF on Echo     HPI:   Briefly, 86 y/o F with history of COPD and dementia presents to the ED with her daughter with SOB. She has been having decreased appetite for the past 2 weeks. About 3 days ago she developed fatigue and generalized weakness. 2 days ago, she had worsening SOB and GARCIA. She is on PRN supplemental oxygen and for the past 2 days they have increased her oxygen use. She has been using at home albuterol with no relief. Reports nausea without vomiting, diarrhea, dark colored urine. Denies fever, chills, chest pain, abdominal pain, lightheadedness, LE edema, URI symptoms.      , TTE 45% CVP 3 PASP 36, Euvolemic on exam. BP boderline 90-110s systolic.  Cardiology consulted due to newly diagnosed borderline EF.      Past Medical History:   Diagnosis Date    Alzheimer disease     Bronchiectasis     Cataract     both eyes    Celiac disease     Emphysema of lung     Encounter for blood transfusion     Lung disease     Short-term memory loss        Past Surgical History:   Procedure Laterality Date    APPENDECTOMY      CATARACT EXTRACTION W/  INTRAOCULAR LENS IMPLANT Right 4/14/14    CATARACT EXTRACTION W/  INTRAOCULAR LENS IMPLANT Left 5/12/14    EXCISION-MASS Left 10/31/2013    Performed by SAL Morgan III, MD at [facility] OR [address] [place] [address]    EXCISION-SOFT TISSUE - right shin calcium deposits Right  2015    Performed by SAL [facility] III, [facility] at [facility] OR 2ND FLR    INSERTION, IOL PROSTHESIS Left 2014    Performed by R[facility]to Flores MD at [facility] OR    INSERTION, IOL PROSTHESIS Right 2014    Performed by [facility]to Flores MD at [facility] OR    PHACOEMULSIFICATION, CATARACT Left 2014    Performed by [facility] Flores MD at [facility] OR    PHACOEMULSIFICATION, CATARACT Right 2014    Performed by [facility]to Flores MD at [facility] OR    TONSILLECTOMY      uterine suspension         Review of patient's allergies indicates:   Allergen Reactions    Gluten protein        No current facility-administered medications on file prior to encounter.      Current Outpatient Medications on File Prior to Encounter   Medication Sig    albuterol 90 mcg/actuation inhaler Inhale 2 puffs into the lungs every 4 (four) hours as needed for Wheezing.    multivitamin capsule Take 1 capsule by mouth once daily.    SYMBICORT 160-4.5 mcg/actuation HFAA 2 (two) times daily.     umeclidinium-vilanterol (ANORO ELLIPTA) 62.5-25 mcg/actuation DsDv Inhale 1 puff into the lungs once daily. Controller    [DISCONTINUED] donepezil (ARICEPT) 5 MG tablet Take 1 tablet (5 mg total) by mouth once daily.    [DISCONTINUED] IBUPROFEN (ADVIL ORAL) Take 1 tablet by mouth as needed.    [DISCONTINUED] influenza (FLUZONE HIGH-DOSE) 180 mcg/0.5 mL vaccine Inject into the muscle.     Family History     Problem Relation (Age of Onset)    COPD Brother    Cancer Mother, Father    Glaucoma Sister    Heart disease Sister        Tobacco Use    Smoking status: Former Smoker     Packs/day: 2.00     Years: 35.00     Pack years: 70.00     Types: Cigarettes     Last attempt to quit: 1988     Years since quittin.9    Smokeless tobacco: Never Used   Substance and Sexual Activity    Alcohol use: No     Alcohol/week: 0.0 oz     Comment: twice a year on special occassions    Drug use: No    Sexual activity: Not Currently     Partners: Male     Review of  Systems   Constitution: Negative for chills, decreased appetite and diaphoresis.   HENT: Negative for congestion and ear discharge.    Eyes: Negative for blurred vision and discharge.   Cardiovascular: Negative for chest pain, dyspnea on exertion, irregular heartbeat, leg swelling and paroxysmal nocturnal dyspnea.   Respiratory: Negative for cough, hemoptysis and shortness of breath.    Gastrointestinal: Negative for abdominal pain.     Objective:     Vital Signs (Most Recent):  Temp: 97.4 °F (36.3 °C) (04/20/19 0603)  Pulse: 92 (04/20/19 1422)  Resp: 20 (04/20/19 1422)  BP: 92/68 (04/20/19 0947)  SpO2: 95 % (04/20/19 1422) Vital Signs (24h Range):  Temp:  [97.3 °F (36.3 °C)-97.4 °F (36.3 °C)] 97.4 °F (36.3 °C)  Pulse:  [68-97] 92  Resp:  [16-24] 20  SpO2:  [92 %-99 %] 95 %  BP: ()/(68-82) 92/68     Weight: 51.3 kg (113 lb)  Body mass index is 19.4 kg/m².    SpO2: 95 %  O2 Device (Oxygen Therapy): nasal cannula    No intake or output data in the 24 hours ending 04/20/19 1701    Lines/Drains/Airways     Peripheral Intravenous Line                 Peripheral IV - Single Lumen 04/18/19 1501 20 G Right Antecubital 2 days                Physical Exam   Constitutional: She is oriented to person, place, and time. She appears well-developed and well-nourished. No distress.   Eyes: Pupils are equal, round, and reactive to light. Conjunctivae are normal.   Neck: No tracheal deviation present. No thyromegaly present.   Cardiovascular: Normal rate, regular rhythm, normal heart sounds and intact distal pulses. Exam reveals no gallop and no friction rub.   No murmur heard.  Pulmonary/Chest: Effort normal. No respiratory distress. She has decreased breath sounds. She has no wheezes. She has no rales.   Abdominal: Soft. Bowel sounds are normal. She exhibits no distension. There is no tenderness.   Musculoskeletal: She exhibits no edema or deformity.   Neurological: She is alert and oriented to person, place, and time. No  cranial nerve deficit. Coordination normal.   Skin: Skin is warm and dry. She is not diaphoretic.   Psychiatric: She has a normal mood and affect. Her behavior is normal.       Significant Labs:   BMP:   Recent Labs   Lab 04/19/19 0449 04/19/19  1532 04/20/19  0538   * 144* 118*    135* 133*   K 5.3* 4.6 4.4    98 100   CO2 24 29 23   BUN 20 20 20   CREATININE 0.8 0.8 0.9   CALCIUM 8.6* 8.6* 8.6*   MG 2.0  --  2.0   , CMP   Recent Labs   Lab 04/19/19 0449 04/19/19  1532 04/20/19  0538    135* 133*   K 5.3* 4.6 4.4    98 100   CO2 24 29 23   * 144* 118*   BUN 20 20 20   CREATININE 0.8 0.8 0.9   CALCIUM 8.6* 8.6* 8.6*   PROT 6.5  --  6.7   ALBUMIN 2.4*  --  2.6*   BILITOT 0.4  --  0.3   ALKPHOS 125  --  115   AST 24  --  22   ALT 12  --  12   ANIONGAP 10 8 10   ESTGFRAFRICA >60.0 >60.0 >60.0   EGFRNONAA >60.0 >60.0 57.7*   , CBC   Recent Labs   Lab 04/19/19 0449 04/20/19  0538   WBC 11.84 17.50*   HGB 11.7* 12.1   HCT 35.9* 37.9    193   , Lipid Panel No results for input(s): CHOL, HDL, LDLCALC, TRIG, CHOLHDL in the last 48 hours. and Troponin   Recent Labs   Lab 04/18/19  1921   TROPONINI 0.039*       Significant Imaging: Echocardiogram: Transthoracic echo (TTE) complete (Cupid Only): No results found for this or any previous visit.    Assessment and Plan:     Congestive heart failure (CHF)  - Patient seen and examined  - Boderline reduced EF 45%  - Patient also with borderline BP, dementia, etc. Studies have not shown improvement of EF with GDMT in this group (low normal) especially with multiple risks of orthostatic hypotension, falling etc.  - PRN Lasix 20mg for weight gain >3-4 lbs  - check weight 3-4 times a week  - Hold off on GDMT at this time  - No BB due to severe COPD          VTE Risk Mitigation (From admission, onward)        Ordered     Place sequential compression device  Until discontinued      04/18/19 1359     IP VTE HIGH RISK PATIENT  Once       04/18/19 5777          Thank you for your consult. I will sign off. Please contact us if you have any additional questions.    Megan Corona MD  Cardiology   [facility]

## 2019-04-20 NOTE — CARE UPDATE
Rapid Response Nurse Chart Check     Chart check completed, abnormal VS noted, bedside RNGinny contacted, no concerns   verbalized at this time, instructed to call [phone] for further concerns or assistance.  AI alert received.   Upon entering room, patient sitting in bed comfortable, no complaints. Daughter at bedside states patient just got back in bed from using the bathroom.  Bedside RNGinny aware that the visi monitor most probably recorded an erroneous HR and that the primary team is aware that the patient's BP sometimes runs low especially after activity.

## 2019-04-20 NOTE — PT/OT/SLP EVAL
"Physical Therapy Evaluation and Discharge Note    Patient Name:  Zeynep Hamm   MRN:  [id number]    Recommendations:     Discharge Recommendations:  home   Discharge Equipment Recommendations: none   Barriers to discharge: None    Assessment:     Zeynep Hamm is a 87 y.o. female admitted with a medical diagnosis of COPD exacerbation. .  At this time, patient is functioning at their prior level of function and does not require further acute PT services.     Recent Surgery: * No surgery found *      Plan:     During this hospitalization, patient does not require further acute PT services.  Please re-consult if situation changes.      Subjective     Chief Complaint: fatigue  Patient/Family Comments/goals: "I don't want to walk too much right now."  Pain/Comfort:  · Pain Rating 1: 0/10    Patients cultural, spiritual, Uatsdin conflicts given the current situation: no    Living Environment:  Pt lives c son in 2SH c TH.  Living quarters on 1st floor but requires access to 2nd floor for bathing - assisted by family.  Prior to admission, patients level of function was independent and using WC for community distances.  Equipment used at home: oxygen, wheelchair, rollator, shower chair.  DME owned (not currently used): none.  Upon discharge, patient will have assistance from family.    Objective:     Communicated with RN prior to session.  Patient found HOB elevated with telemetry, peripheral IV, oxygen upon PT entry to room.    General Precautions: Standard, fall   Orthopedic Precautions:N/A   Braces: N/A     Exams:  · Cognitive Exam:  Patient is oriented to Person, Place, Time and Situation  · RLE ROM: WFL  · RLE Strength: WFL  · LLE ROM: WFL  · LLE Strength: WFL    Functional Mobility:  · Bed Mobility:     · Rolling Right: independence  · Scooting: independence  · Supine to Sit: independence  · Sit to Supine: independence  · Transfers:  Sit to Stand:  supervision with no AD  · Gait: 30ft c no AD S  · Balance: " standing (S)    AM-PAC 6 CLICK MOBILITY  Total Score:20       Therapeutic Activities and Exercises:  Pt educated on: PT role/POC; safety c mobility; benefits of OOB activities; performing therex; d/c recs - v/u      AM-PAC 6 CLICK MOBILITY  Total Score:20     Patient left HOB elevated with all lines intact, call button in reach, RN notified and daughter present.    GOALS:   Multidisciplinary Problems     Physical Therapy Goals     Not on file          Multidisciplinary Problems (Resolved)        Problem: Physical Therapy Goal    Goal Priority Disciplines Outcome Goal Variances Interventions   Physical Therapy Goal   (Resolved)     PT, PT/OT Outcome(s) achieved     Description:  Pt does not require additional acute PT services at this time d/t baseline c functional mobility.    Pt educated on asking medical staff for PT consult if changes in functional status occurs. - v/u                          History:     Past Medical History:   Diagnosis Date    Alzheimer disease     Bronchiectasis     Cataract     both eyes    Celiac disease     Emphysema of lung     Encounter for blood transfusion     Lung disease     Short-term memory loss        Past Surgical History:   Procedure Laterality Date    APPENDECTOMY      CATARACT EXTRACTION W/  INTRAOCULAR LENS IMPLANT Right 4/14/14    CATARACT EXTRACTION W/  INTRAOCULAR LENS IMPLANT Left 5/12/14    EXCISION-MASS Left 10/31/2013    [facility] SAL [facility] 2ND FLR    EXCISION-SOFT TISSUE - right shin calcium deposits Right 8/19/2015    Performed by SAL Morgan III[facility] at [facility] 2ND FLR    INSERTION, IOL PROSTHESIS Left 5/12/2014    Performed by Roverto Flores MD at [facility] [place]    INSERTION, IOL PROSTHESIS Right 4/14/2014    Performed by R[facility]to Flores [facility] at [facility] [place]    PHACOEMULSIFICATION, CATARACT Left 5/12/2014    Performed by R[facility]to Flores [facility] at [facility] [place]    PHACOEMULSIFICATION, CATARACT Right 4/14/2014    Performed by Roverto Flores MD  at [facility] [place] OR    TONSILLECTOMY      uterine suspension         Time Tracking:     PT Received On: 04/20/19  PT Start Time: 1104     PT Stop Time: 1115  PT Total Time (min): 11 min     Billable Minutes: Evaluation 11 min      Aguilar Mares, PT  04/20/2019

## 2019-04-20 NOTE — SUBJECTIVE & OBJECTIVE
Past Medical History:   Diagnosis Date    Alzheimer disease     Bronchiectasis     Cataract     both eyes    Celiac disease     Emphysema of lung     Encounter for blood transfusion     Lung disease     Short-term memory loss        Past Surgical History:   Procedure Laterality Date    APPENDECTOMY      CATARACT EXTRACTION W/  INTRAOCULAR LENS IMPLANT Right 4/14/14    CATARACT EXTRACTION W/  INTRAOCULAR LENS IMPLANT Left 5/12/14    EXCISION-MASS Left 10/31/2013    Performed by SAL [facility] 2ND [facility]    EXCISION-SOFT TISSUE - right shin calcium deposits Right 8/19/2015    Performed by SAL Morgan III, [facility] at [facility]ND [facility]    INSERTION, IOL PROSTHESIS Left 5/12/2014    Performed by [facility] MD at [facility] OR    INSERTION, IOL PROSTHESIS Right 4/14/2014    Performed by [facility] MD at [facility] OR    PHACOEMULSIFICATION, CATARACT Left 5/12/2014    Performed by [facility] Flores MD at [facility] OR    PHACOEMULSIFICATION, CATARACT Right 4/14/2014    Performed by [facility] MD at [facility] OR    TONSILLECTOMY      uterine suspension         Review of patient's allergies indicates:   Allergen Reactions    Gluten protein        No current facility-administered medications on file prior to encounter.      Current Outpatient Medications on File Prior to Encounter   Medication Sig    albuterol 90 mcg/actuation inhaler Inhale 2 puffs into the lungs every 4 (four) hours as needed for Wheezing.    multivitamin capsule Take 1 capsule by mouth once daily.    SYMBICORT 160-4.5 mcg/actuation HFAA 2 (two) times daily.     umeclidinium-vilanterol (ANORO ELLIPTA) 62.5-25 mcg/actuation DsDv Inhale 1 puff into the lungs once daily. Controller    [DISCONTINUED] donepezil (ARICEPT) 5 MG tablet Take 1 tablet (5 mg total) by mouth once daily.    [DISCONTINUED] IBUPROFEN (ADVIL ORAL) Take 1 tablet by mouth as needed.    [DISCONTINUED] influenza (FLUZONE HIGH-DOSE) 180 mcg/0.5 mL vaccine Inject into  the muscle.     Family History     Problem Relation (Age of Onset)    COPD Brother    Cancer Mother, Father    Glaucoma Sister    Heart disease Sister        Tobacco Use    Smoking status: Former Smoker     Packs/day: 2.00     Years: 35.00     Pack years: 70.00     Types: Cigarettes     Last attempt to quit: 1988     Years since quittin.9    Smokeless tobacco: Never Used   Substance and Sexual Activity    Alcohol use: No     Alcohol/week: 0.0 oz     Comment: twice a year on special occassions    Drug use: No    Sexual activity: Not Currently     Partners: Male     Review of Systems   Constitution: Negative for chills, decreased appetite and diaphoresis.   HENT: Negative for congestion and ear discharge.    Eyes: Negative for blurred vision and discharge.   Cardiovascular: Negative for chest pain, dyspnea on exertion, irregular heartbeat, leg swelling and paroxysmal nocturnal dyspnea.   Respiratory: Negative for cough, hemoptysis and shortness of breath.    Gastrointestinal: Negative for abdominal pain.     Objective:     Vital Signs (Most Recent):  Temp: 97.4 °F (36.3 °C) (19 0603)  Pulse: 92 (19 1422)  Resp: 20 (19 1422)  BP: 92/68 (19 0947)  SpO2: 95 % (19 1422) Vital Signs (24h Range):  Temp:  [97.3 °F (36.3 °C)-97.4 °F (36.3 °C)] 97.4 °F (36.3 °C)  Pulse:  [68-97] 92  Resp:  [16-24] 20  SpO2:  [92 %-99 %] 95 %  BP: ()/(68-82) 92/68     Weight: 51.3 kg (113 lb)  Body mass index is 19.4 kg/m².    SpO2: 95 %  O2 Device (Oxygen Therapy): nasal cannula    No intake or output data in the 24 hours ending 19 1701    Lines/Drains/Airways     Peripheral Intravenous Line                 Peripheral IV - Single Lumen 19 1501 20 G Right Antecubital 2 days                Physical Exam   Constitutional: She is oriented to person, place, and time. She appears well-developed and well-nourished. No distress.   Eyes: Pupils are equal, round, and reactive to light.  Conjunctivae are normal.   Neck: No tracheal deviation present. No thyromegaly present.   Cardiovascular: Normal rate, regular rhythm, normal heart sounds and intact distal pulses. Exam reveals no gallop and no friction rub.   No murmur heard.  Pulmonary/Chest: Effort normal. No respiratory distress. She has decreased breath sounds. She has no wheezes. She has no rales.   Abdominal: Soft. Bowel sounds are normal. She exhibits no distension. There is no tenderness.   Musculoskeletal: She exhibits no edema or deformity.   Neurological: She is alert and oriented to person, place, and time. No cranial nerve deficit. Coordination normal.   Skin: Skin is warm and dry. She is not diaphoretic.   Psychiatric: She has a normal mood and affect. Her behavior is normal.       Significant Labs:   BMP:   Recent Labs   Lab 04/19/19 0449 04/19/19 1532 04/20/19  0538   * 144* 118*    135* 133*   K 5.3* 4.6 4.4    98 100   CO2 24 29 23   BUN 20 20 20   CREATININE 0.8 0.8 0.9   CALCIUM 8.6* 8.6* 8.6*   MG 2.0  --  2.0   , CMP   Recent Labs   Lab 04/19/19 0449 04/19/19 1532 04/20/19  0538    135* 133*   K 5.3* 4.6 4.4    98 100   CO2 24 29 23   * 144* 118*   BUN 20 20 20   CREATININE 0.8 0.8 0.9   CALCIUM 8.6* 8.6* 8.6*   PROT 6.5  --  6.7   ALBUMIN 2.4*  --  2.6*   BILITOT 0.4  --  0.3   ALKPHOS 125  --  115   AST 24  --  22   ALT 12  --  12   ANIONGAP 10 8 10   ESTGFRAFRICA >60.0 >60.0 >60.0   EGFRNONAA >60.0 >60.0 57.7*   , CBC   Recent Labs   Lab 04/19/19 0449 04/20/19  0538   WBC 11.84 17.50*   HGB 11.7* 12.1   HCT 35.9* 37.9    193   , Lipid Panel No results for input(s): CHOL, HDL, LDLCALC, TRIG, CHOLHDL in the last 48 hours. and Troponin   Recent Labs   Lab 04/18/19  1921   TROPONINI 0.039*       Significant Imaging: Echocardiogram: Transthoracic echo (TTE) complete (Cupid Only): No results found for this or any previous visit.

## 2019-04-20 NOTE — MEDICAL/APP STUDENT
Hospital Medicine  Progress note    Team: [facility] HOSP MED B Dc Gonzalez  Admit Date: 4/18/2019  MARYANN   Length of Stay:  LOS: 2 days   Code status: Full Code    Principal Problem:  COPD exacerbation    Overview:  88 y/o WF with history of COPD and dementia presents to the ED with her daughter with SOB. Potassium 5.3 given kayexalate. PT/OT consulted. Procal came back negative and sinus tachy. Echo and BNP ordered, BNP at 376. Lasix at 20mg daily PO.     Interval hx:      ROS     Constitutional: Positive for appetite change (decreased) and fatigue. Negative for chills and fever.   HENT: Negative for congestion, rhinorrhea and sore throat.    Eyes: Negative for photophobia and visual disturbance.   Respiratory: Positive for shortness of breath. Negative for cough.    Cardiovascular: Negative for chest pain and leg swelling.   Gastrointestinal: Positive for diarrhea and nausea. Negative for abdominal pain, constipation and vomiting.   Genitourinary: Negative for dysuria and hematuria.   Musculoskeletal: Negative for neck pain and neck stiffness.   Skin: Negative for rash.   Neurological: Positive for weakness (generalized). Negative for light-headedness, numbness and headaches.   Psychiatric/Behavioral: Positive for confusion (h/o Alzheimers).       PEx  Temp:  [97.3 °F (36.3 °C)-97.4 °F (36.3 °C)]   Pulse:  [68-97]   Resp:  [16-24]   BP: ()/(67-82)   SpO2:  [92 %-99 %]   No intake or output data in the 24 hours ending 04/20/19 1001  Constitutional: She appears well-developed and well-nourished. She is not diaphoretic. No distress.   HENT:   Head: Normocephalic and atraumatic.   Neck: Normal range of motion. Neck supple.   Cardiovascular: Regular rhythm and normal heart sounds. Tachycardia present.  Exam reveals no gallop and no friction rub.    No murmur heard.  No LE edema   Pulmonary/Chest: Breath sounds normal. No respiratory distress. She has no wheezes. She has no rhonchi. She has no rales. She exhibits no  tenderness.   On supplemental oxygen via nasal cannula.    Abdominal: Soft. Bowel sounds are normal. There is no tenderness. There is no rebound and no guarding.   Musculoskeletal: Normal range of motion.   Neurological: She is alert and oriented to person, place, and time.   Skin: Skin is warm and dry. No erythema.  Calcium deposit on the left anterior shin  Psychiatric: She has a normal mood and affect.    Recent Labs   Lab 04/18/19  1145 04/19/19  0449 04/20/19  0538   WBC 13.00* 11.84 17.50*   HGB 14.3 11.7* 12.1   HCT 42.9 35.9* 37.9    163 193     Recent Labs   Lab 04/18/19  1501 04/19/19  0449 04/19/19  1532 04/20/19  0538   NA  --  136 135* 133*   K  --  5.3* 4.6 4.4   CL  --  102 98 100   CO2  --  24 29 23   BUN  --  20 20 20   CREATININE  --  0.8 0.8 0.9   GLU  --  141* 144* 118*   CALCIUM  --  8.6* 8.6* 8.6*   MG  --  2.0  --  2.0   PHOS  --  4.2  --  4.0   LIPASE 45  --   --   --      Recent Labs   Lab 04/18/19  1145 04/19/19  0449 04/20/19  0538   ALKPHOS 167* 125 115   ALT 17 12 12   AST 38 24 22   ALBUMIN 3.0* 2.4* 2.6*   PROT 8.2 6.5 6.7   BILITOT 1.1* 0.4 0.3        Recent Labs     04/18/19  1145 04/18/19  1501 04/18/19  1921   TROPONINI 0.073* 0.051* 0.039*     No results for input(s): POCTGLUCOSE in the last 168 hours.  Hemoglobin A1C   Date Value Ref Range Status   04/18/2019 5.4 4.0 - 5.6 % Final     Comment:     ADA Screening Guidelines:  5.7-6.4%  Consistent with prediabetes  >or=6.5%  Consistent with diabetes  High levels of fetal hemoglobin interfere with the HbA1C  assay. Heterozygous hemoglobin variants (HbS, HgC, etc)do  not significantly interfere with this assay.   However, presence of multiple variants may affect accuracy.         Scheduled Meds:   furosemide  20 mg Oral Daily    ipratropium  0.5 mg Nebulization Q6H    levalbuterol  1.25 mg Nebulization Q6H    multivitamin  1 tablet Oral Daily    predniSONE  40 mg Oral Daily    tiotropium  1 capsule Inhalation Daily     umeclidinium-vilanterol  1 puff Inhalation Daily     Continuous Infusions:  As Needed:  acetaminophen, dextrose 50%, dextrose 50%, dextrose 50%, dextrose 50%, glucagon (human recombinant), glucose, glucose, glucose, ondansetron, sodium chloride 0.9%    Active Hospital Problems    Diagnosis  POA    *COPD exacerbation [J44.1]  Yes     Severe obstruction per pft in 2004.  Would not be able to perform pft due to memory issue.  Recommend resumption of anora in addition to symbicort.  Per daughter, she does not wish for intubation or cpr in the case of cardiopulmonary arrest.  Daughter is hesitant about palliative care referral for advance care planning.  Patient is up to date with vaccination.        Hyperkalemia [E87.5]  Yes    Elevated brain natriuretic peptide (BNP) level [R79.89]  Yes    Hypoxia [R09.02]  Yes    Celiac disease [K90.0]  Yes    Dementia [F03.90]  Yes      Resolved Hospital Problems   No resolved problems to display.         EKG - Normal sinus rhythm @ 99bm   Prolonged QT  Assessment and Plan:     Hypoxia  COPD exacerbation  - O2 at home used as needed, currently on 2L NC  - Prednisone started  - Duonebs q4hrs  - Lactic acid normal  - Troponin elevated but trending down 0.07-->0.05     Hyperkalemia 5.3  Received Kayexalate repeat CMP in the evening     Elevated BNP  376.  Echocardiogram ordered.  Lasix 20 mg p.o. daily.  Strict input output monitor     Leukocytosis  -resolved   - Check tomorrow for choice on antibiotics  - received ceftriaxone and azithromycin in the emergency department   procalcitonin negative.  Will hold antibiotics for     Dementia in Alzheimer's disease  - No current issues  - Patient Ox2  - not interested in donepezil.       CKD - stage III  - Appears to be at baseline     Calcium deposit on the left anterior  Previously seen by Plastic surgery and operated firs subcutaneous deposits.  No active issues     Celiac disease  Continue with gluten free diet     Discharge  plan: Home health.  PT/OT evaluation pending

## 2019-04-20 NOTE — PLAN OF CARE
JEY spoke with pt's daughter Ramon at bedside regarding discharge plan of care for home health.     JEY sent home health referral to [facility] via [facility]. SW will follow up.    Daughter Ramon states that pt has home oxygen at home. Ramon also states that she will obtain the nebulizer from their preferred pharmacy, [facility] in [place].     2:59 PM  SW contacted [facility] to follow up on referral. Pt has been accepted to [place]. Patient will be seen on Monday.    Fabi Mayfield LMSW  [facility]

## 2019-04-20 NOTE — HPI
Briefly, 88 y/o F with history of COPD and dementia presents to the ED with her daughter with SOB. She has been having decreased appetite for the past 2 weeks. About 3 days ago she developed fatigue and generalized weakness. 2 days ago, she had worsening SOB and GARCIA. She is on PRN supplemental oxygen and for the past 2 days they have increased her oxygen use. She has been using at home albuterol with no relief. Reports nausea without vomiting, diarrhea, dark colored urine. Denies fever, chills, chest pain, abdominal pain, lightheadedness, LE edema, URI symptoms.      , TTE 45% CVP 3 PASP 36, Euvolemic on exam. BP boderline 90-110s systolic.  Cardiology consulted due to newly diagnosed borderline EF.

## 2019-04-20 NOTE — PLAN OF CARE
Problem: Physical Therapy Goal  Goal: Physical Therapy Goal  Pt does not require additional acute PT services at this time d/t baseline c functional mobility.    Pt educated on asking medical staff for PT consult if changes in functional status occurs. - v/u        Outcome: Outcome(s) achieved Date Met: 04/20/19  Eval and D/C from acute PT services    Aguilar Mares, PT,DPT  4/20/2019

## 2019-04-20 NOTE — PLAN OF CARE
[facility]    HOME HEALTH ORDERS  FACE TO FACE ENCOUNTER    Patient Name: Zeynep Hamm  YOB: 1931    PCP: Maya Kelley MD   PCP Address: [address] / [place] [address]  PCP Phone Number: [phone]  PCP Fax: [phone]    Encounter Date: 04/20/2019    Admit to Home Health    Diagnoses:  Active Hospital Problems    Diagnosis  POA    *COPD exacerbation [J44.1]  Yes     Severe obstruction per pft in 2004.  Would not be able to perform pft due to memory issue.  Recommend resumption of anora in addition to symbicort.  Per daughter, she does not wish for intubation or cpr in the case of cardiopulmonary arrest.  Daughter is hesitant about palliative care referral for advance care planning.  Patient is up to date with vaccination.        Hyperkalemia [E87.5]  Yes    Elevated brain natriuretic peptide (BNP) level [R79.89]  Yes    Hypoxia [R09.02]  Yes    Celiac disease [K90.0]  Yes    Dementia [F03.90]  Yes      Resolved Hospital Problems   No resolved problems to display.       No future appointments.        I have seen and examined this patient face to face today. My clinical findings that support the need for the home health skilled services and home bound status are the following:  Weakness/numbness causing balance and gait disturbance due to Weakness/Debility making it taxing to leave home.    Allergies:  Review of patient's allergies indicates:   Allergen Reactions    Gluten protein        Diet: regular diet    Activities: activity as tolerated    Nursing:   SN to complete comprehensive assessment including routine vital signs. Instruct on disease process and s/s of complications to report to MD. Review/verify medication list sent home with the patient at time of discharge  and instruct patient/caregiver as needed. Frequency may be adjusted depending on start of care date.    Notify MD if SBP > 160 or < 90; DBP > 90 or < 50; HR > 120 or < 50; Temp > 101;      Heart Failure:      SN to instruct on the following:    Instruct on the definition of CHF.   Instruct on the signs/sympoms of CHF to be reported.   Instruct on and monitor daily weights.   Instruct on factors that cause exacerbation.   Instruct on action, dose, schedule, and side effects of medications.   Instruct on diet as prescribed.   Instruct on activity allowed.   Instruct on life-style modifications for life long management of CHF   SN to assess compliance with daily weights, diet, medications, fluid retention,    safety precautions, activities permitted and life-style modifications.   Additional 1-2 SN visits per week as needed for signs and symptoms     of CHF exacerbation.      For Weight Gain > 2-3 lbs in 1 day or 4-6 lbs over 1 week notify PCP, please give lasix 20mg PO as needed       CONSULTS:    Physical Therapy to evaluate and treat. Evaluate for home safety and equipment needs; Establish/upgrade home exercise program. Perform / instruct on therapeutic exercises, gait training, transfer training, and Range of Motion.  Occupational Therapy to evaluate and treat. Evaluate home environment for safety and equipment needs. Perform/Instruct on transfers, ADL training, ROM, and therapeutic exercises.  Aide to provide assistance with personal care, ADLs, and vital signs.    MISCELLANEOUS CARE:  Home Oxygen:  Oxygen at 2 L/min nasal canula to be used:  As needed for SOB.    WOUND CARE ORDERS  n/a      Medications: Review discharge medications with patient and family and provide education.     Zeynep Hamm   Home Medication Instructions BEL:[id number]    Printed on:04/20/19 4690   Medication Information                      acetaminophen (TYLENOL) 325 MG tablet  Take 2 tablets (650 mg total) by mouth every 6 (six) hours as needed.             albuterol 90 mcg/actuation inhaler  Inhale 2 puffs into the lungs every 4 (four) hours as needed for Wheezing.             albuterol-ipratropium (DUO-NEB) 2.5  mg-0.5 mg/3 mL nebulizer solution  Take 3 mLs by nebulization every 6 (six) hours as needed for Wheezing or Shortness of Breath. Rescue             furosemide (LASIX) 20 MG tablet  Take 1 tablet (20 mg total) by mouth as needed.    For Weight Gain > 2-3 lbs in 1 day or 4-6 lbs over 1 week notify PCP, please give lasix 20mg PO as needed             multivitamin capsule  Take 1 capsule by mouth once daily.             predniSONE (DELTASONE) 20 MG tablet  Take 2 tablets (40 mg total) by mouth once daily. for 2 days             SYMBICORT 160-4.5 mcg/actuation HFAA  2 (two) times daily.              umeclidinium-vilanterol (ANORO ELLIPTA) 62.5-25 mcg/actuation DsDv  Inhale 1 puff into the lungs once daily. Controller               I certify that this patient is confined to her home and needs intermittent skilled nursing care, physical therapy and occupational therapy.

## 2019-04-20 NOTE — ASSESSMENT & PLAN NOTE
- Patient seen and examined  - Boderline reduced EF 45%  - Patient also with borderline BP, dementia, etc. Studies have not shown improvement of EF with GDMT in this group (low normal) especially with multiple risks of orthostatic hypotension, falling etc.  - PRN Lasix 20mg for weight gain >3-4 lbs  - check weight 3-4 times a week  - Hold off on GDMT at this time  - No BB due to severe COPD

## 2019-04-22 DIAGNOSIS — J44.9 CHRONIC OBSTRUCTIVE PULMONARY DISEASE, UNSPECIFIED COPD TYPE: ICD-10-CM

## 2019-04-22 RX ORDER — ALBUTEROL SULFATE 90 UG/1
AEROSOL, METERED RESPIRATORY (INHALATION)
Qty: 18 G | Refills: 0 | OUTPATIENT
Start: 2019-04-22

## 2019-04-23 ENCOUNTER — TELEPHONE (OUTPATIENT)
Dept: INTERNAL MEDICINE | Facility: CLINIC | Age: 84
End: 2019-04-23

## 2019-04-23 ENCOUNTER — NURSE TRIAGE (OUTPATIENT)
Dept: ADMINISTRATIVE | Facility: CLINIC | Age: 84
End: 2019-04-23

## 2019-04-23 NOTE — TELEPHONE ENCOUNTER
----- Message from Tayal Deras sent at 4/23/2019  9:31 AM CDT -----  Contact: Srinath/Noni [facility]/[phone]  Patient's daughter has put therapy on hold because patient is to weak.  No OT/PT evaluations has been done.    Please advise, thank you

## 2019-04-23 NOTE — TELEPHONE ENCOUNTER
"Called daughter Ramon for TCC. She states that pt since Sunday has not wanted to eat, drink or get out of bed. She is oriented to self and place as usual, but yesterday asked what anuragoli was when she'd normally know what it is. States all pt wants to do is sleep. Instructed to go to ER now. She states she is headed to her Mom's now. She and her aunt are nurses and will "check her over" and decide if they will go to ER or not. Instructed if they decide not to bring pt to call me back at [phone] option 4 and let me know so that I can put in Urgent NP home visit. Daughter agreed. Message to pcp.    Reason for Disposition   Very strange or paranoid behavior    Protocols used: CONFUSION - DELIRIUM-A-OH      "

## 2019-04-26 ENCOUNTER — HOSPITAL ENCOUNTER (INPATIENT)
Facility: HOSPITAL | Age: 84
LOS: 12 days | Discharge: HOSPICE | DRG: 420 | End: 2019-05-08
Attending: EMERGENCY MEDICINE | Admitting: INTERNAL MEDICINE
Payer: MEDICARE

## 2019-04-26 DIAGNOSIS — R41.82 ALTERED MENTAL STATUS, UNSPECIFIED: ICD-10-CM

## 2019-04-26 DIAGNOSIS — R00.0 TACHYCARDIA: ICD-10-CM

## 2019-04-26 DIAGNOSIS — Z51.5 PALLIATIVE CARE ENCOUNTER: ICD-10-CM

## 2019-04-26 DIAGNOSIS — Z66 DNR (DO NOT RESUSCITATE): ICD-10-CM

## 2019-04-26 DIAGNOSIS — K86.89 MASS OF PANCREAS: ICD-10-CM

## 2019-04-26 DIAGNOSIS — Z71.89 GOALS OF CARE, COUNSELING/DISCUSSION: ICD-10-CM

## 2019-04-26 DIAGNOSIS — K83.1 BILIARY OBSTRUCTION: Primary | ICD-10-CM

## 2019-04-26 PROBLEM — J44.9 COPD (CHRONIC OBSTRUCTIVE PULMONARY DISEASE): Status: ACTIVE | Noted: 2019-04-26

## 2019-04-26 PROBLEM — K83.09 CHOLANGITIS: Status: ACTIVE | Noted: 2019-04-26

## 2019-04-26 PROBLEM — D49.0: Status: ACTIVE | Noted: 2019-04-26

## 2019-04-26 LAB
ALBUMIN SERPL BCP-MCNC: 2.5 G/DL (ref 3.5–5.2)
ALP SERPL-CCNC: 129 U/L (ref 55–135)
ALT SERPL W/O P-5'-P-CCNC: 49 U/L (ref 10–44)
ANION GAP SERPL CALC-SCNC: 11 MMOL/L (ref 8–16)
ANISOCYTOSIS BLD QL SMEAR: SLIGHT
AST SERPL-CCNC: 73 U/L (ref 10–40)
BASOPHILS # BLD AUTO: 0.03 K/UL (ref 0–0.2)
BASOPHILS NFR BLD: 0.2 % (ref 0–1.9)
BILIRUB DIRECT SERPL-MCNC: 3.7 MG/DL (ref 0.1–0.3)
BILIRUB SERPL-MCNC: 6.3 MG/DL (ref 0.1–1)
BILIRUB UR QL STRIP: ABNORMAL
BUN SERPL-MCNC: 14 MG/DL (ref 8–23)
CALCIUM SERPL-MCNC: 9 MG/DL (ref 8.7–10.5)
CHLORIDE SERPL-SCNC: 98 MMOL/L (ref 95–110)
CLARITY UR REFRACT.AUTO: CLEAR
CO2 SERPL-SCNC: 25 MMOL/L (ref 23–29)
COLOR UR AUTO: ABNORMAL
CREAT SERPL-MCNC: 0.7 MG/DL (ref 0.5–1.4)
DIFFERENTIAL METHOD: ABNORMAL
EOSINOPHIL # BLD AUTO: 0.1 K/UL (ref 0–0.5)
EOSINOPHIL NFR BLD: 0.7 % (ref 0–8)
ERYTHROCYTE [DISTWIDTH] IN BLOOD BY AUTOMATED COUNT: 15 % (ref 11.5–14.5)
EST. GFR  (AFRICAN AMERICAN): >60 ML/MIN/1.73 M^2
EST. GFR  (NON AFRICAN AMERICAN): >60 ML/MIN/1.73 M^2
GLUCOSE SERPL-MCNC: 126 MG/DL (ref 70–110)
GLUCOSE UR QL STRIP: NEGATIVE
HCT VFR BLD AUTO: 41.4 % (ref 37–48.5)
HGB BLD-MCNC: 13.4 G/DL (ref 12–16)
HGB UR QL STRIP: NEGATIVE
HYPOCHROMIA BLD QL SMEAR: ABNORMAL
IMM GRANULOCYTES # BLD AUTO: 0.16 K/UL (ref 0–0.04)
IMM GRANULOCYTES NFR BLD AUTO: 0.8 % (ref 0–0.5)
KETONES UR QL STRIP: ABNORMAL
LACTATE SERPL-SCNC: 1.7 MMOL/L (ref 0.5–2.2)
LEUKOCYTE ESTERASE UR QL STRIP: NEGATIVE
LYMPHOCYTES # BLD AUTO: 1.6 K/UL (ref 1–4.8)
LYMPHOCYTES NFR BLD: 8.6 % (ref 18–48)
MAGNESIUM SERPL-MCNC: 2 MG/DL (ref 1.6–2.6)
MCH RBC QN AUTO: 28 PG (ref 27–31)
MCHC RBC AUTO-ENTMCNC: 32.4 G/DL (ref 32–36)
MCV RBC AUTO: 87 FL (ref 82–98)
MONOCYTES # BLD AUTO: 2.1 K/UL (ref 0.3–1)
MONOCYTES NFR BLD: 11 % (ref 4–15)
NEUTROPHILS # BLD AUTO: 15 K/UL (ref 1.8–7.7)
NEUTROPHILS NFR BLD: 78.7 % (ref 38–73)
NITRITE UR QL STRIP: NEGATIVE
NRBC BLD-RTO: 0 /100 WBC
PH UR STRIP: 6 [PH] (ref 5–8)
PHOSPHATE SERPL-MCNC: 2.7 MG/DL (ref 2.7–4.5)
PLATELET # BLD AUTO: 190 K/UL (ref 150–350)
PLATELET BLD QL SMEAR: ABNORMAL
PMV BLD AUTO: 10.8 FL (ref 9.2–12.9)
POLYCHROMASIA BLD QL SMEAR: ABNORMAL
POTASSIUM SERPL-SCNC: 4.5 MMOL/L (ref 3.5–5.1)
PROT SERPL-MCNC: 6.9 G/DL (ref 6–8.4)
PROT UR QL STRIP: NEGATIVE
RBC # BLD AUTO: 4.78 M/UL (ref 4–5.4)
SODIUM SERPL-SCNC: 134 MMOL/L (ref 136–145)
SP GR UR STRIP: >=1.03 (ref 1–1.03)
TSH SERPL DL<=0.005 MIU/L-ACNC: 1.08 UIU/ML (ref 0.4–4)
URN SPEC COLLECT METH UR: ABNORMAL
WBC # BLD AUTO: 19.02 K/UL (ref 3.9–12.7)

## 2019-04-26 PROCEDURE — 96361 HYDRATE IV INFUSION ADD-ON: CPT | Mod: HCNC

## 2019-04-26 PROCEDURE — 99285 EMERGENCY DEPT VISIT HI MDM: CPT | Mod: 25,HCNC

## 2019-04-26 PROCEDURE — 93005 ELECTROCARDIOGRAM TRACING: CPT | Mod: HCNC

## 2019-04-26 PROCEDURE — 93010 ELECTROCARDIOGRAM REPORT: CPT | Mod: HCNC,,, | Performed by: INTERNAL MEDICINE

## 2019-04-26 PROCEDURE — 85025 COMPLETE CBC W/AUTO DIFF WBC: CPT | Mod: HCNC

## 2019-04-26 PROCEDURE — 63600175 PHARM REV CODE 636 W HCPCS: Mod: HCNC | Performed by: STUDENT IN AN ORGANIZED HEALTH CARE EDUCATION/TRAINING PROGRAM

## 2019-04-26 PROCEDURE — 82248 BILIRUBIN DIRECT: CPT | Mod: HCNC

## 2019-04-26 PROCEDURE — 25500020 PHARM REV CODE 255: Mod: HCNC | Performed by: EMERGENCY MEDICINE

## 2019-04-26 PROCEDURE — 99233 PR SUBSEQUENT HOSPITAL CARE,LEVL III: ICD-10-PCS | Mod: HCNC,GC,, | Performed by: INTERNAL MEDICINE

## 2019-04-26 PROCEDURE — 96365 THER/PROPH/DIAG IV INF INIT: CPT | Mod: HCNC

## 2019-04-26 PROCEDURE — 81003 URINALYSIS AUTO W/O SCOPE: CPT | Mod: HCNC

## 2019-04-26 PROCEDURE — 83605 ASSAY OF LACTIC ACID: CPT | Mod: HCNC

## 2019-04-26 PROCEDURE — 80053 COMPREHEN METABOLIC PANEL: CPT | Mod: HCNC

## 2019-04-26 PROCEDURE — 25000003 PHARM REV CODE 250: Mod: HCNC | Performed by: STUDENT IN AN ORGANIZED HEALTH CARE EDUCATION/TRAINING PROGRAM

## 2019-04-26 PROCEDURE — 99233 SBSQ HOSP IP/OBS HIGH 50: CPT | Mod: HCNC,GC,, | Performed by: INTERNAL MEDICINE

## 2019-04-26 PROCEDURE — 87040 BLOOD CULTURE FOR BACTERIA: CPT | Mod: HCNC

## 2019-04-26 PROCEDURE — 93010 EKG 12-LEAD: ICD-10-PCS | Mod: HCNC,,, | Performed by: INTERNAL MEDICINE

## 2019-04-26 PROCEDURE — 11000001 HC ACUTE MED/SURG PRIVATE ROOM: Mod: HCNC

## 2019-04-26 PROCEDURE — 84100 ASSAY OF PHOSPHORUS: CPT | Mod: HCNC

## 2019-04-26 PROCEDURE — 84443 ASSAY THYROID STIM HORMONE: CPT | Mod: HCNC

## 2019-04-26 PROCEDURE — 99285 EMERGENCY DEPT VISIT HI MDM: CPT | Mod: HCNC,,, | Performed by: EMERGENCY MEDICINE

## 2019-04-26 PROCEDURE — 83735 ASSAY OF MAGNESIUM: CPT | Mod: HCNC

## 2019-04-26 PROCEDURE — 96372 THER/PROPH/DIAG INJ SC/IM: CPT | Mod: HCNC,59

## 2019-04-26 PROCEDURE — 99285 PR EMERGENCY DEPT VISIT,LEVEL V: ICD-10-PCS | Mod: HCNC,,, | Performed by: EMERGENCY MEDICINE

## 2019-04-26 RX ORDER — IPRATROPIUM BROMIDE AND ALBUTEROL SULFATE 2.5; .5 MG/3ML; MG/3ML
3 SOLUTION RESPIRATORY (INHALATION) EVERY 6 HOURS PRN
Status: DISCONTINUED | OUTPATIENT
Start: 2019-04-26 | End: 2019-05-08 | Stop reason: HOSPADM

## 2019-04-26 RX ORDER — ONDANSETRON 4 MG/1
4 TABLET, FILM COATED ORAL EVERY 6 HOURS PRN
Status: DISCONTINUED | OUTPATIENT
Start: 2019-04-26 | End: 2019-05-03

## 2019-04-26 RX ORDER — ACETAMINOPHEN 325 MG/1
650 TABLET ORAL EVERY 6 HOURS PRN
Status: DISCONTINUED | OUTPATIENT
Start: 2019-04-26 | End: 2019-05-03

## 2019-04-26 RX ORDER — FLUTICASONE FUROATE AND VILANTEROL 100; 25 UG/1; UG/1
1 POWDER RESPIRATORY (INHALATION) DAILY
Status: DISCONTINUED | OUTPATIENT
Start: 2019-04-27 | End: 2019-05-08 | Stop reason: HOSPADM

## 2019-04-26 RX ORDER — IBUPROFEN 200 MG
24 TABLET ORAL
Status: DISCONTINUED | OUTPATIENT
Start: 2019-04-26 | End: 2019-05-08 | Stop reason: HOSPADM

## 2019-04-26 RX ORDER — ALBUTEROL SULFATE 90 UG/1
2 AEROSOL, METERED RESPIRATORY (INHALATION) EVERY 4 HOURS PRN
Status: DISCONTINUED | OUTPATIENT
Start: 2019-04-26 | End: 2019-04-30

## 2019-04-26 RX ORDER — BUDESONIDE AND FORMOTEROL FUMARATE DIHYDRATE 160; 4.5 UG/1; UG/1
2 AEROSOL RESPIRATORY (INHALATION) EVERY 12 HOURS
Status: DISCONTINUED | OUTPATIENT
Start: 2019-04-26 | End: 2019-04-26

## 2019-04-26 RX ORDER — GLUCAGON 1 MG
1 KIT INJECTION
Status: DISCONTINUED | OUTPATIENT
Start: 2019-04-26 | End: 2019-05-08 | Stop reason: HOSPADM

## 2019-04-26 RX ORDER — IBUPROFEN 200 MG
16 TABLET ORAL
Status: DISCONTINUED | OUTPATIENT
Start: 2019-04-26 | End: 2019-05-08 | Stop reason: HOSPADM

## 2019-04-26 RX ORDER — SODIUM CHLORIDE 0.9 % (FLUSH) 0.9 %
10 SYRINGE (ML) INJECTION
Status: DISCONTINUED | OUTPATIENT
Start: 2019-04-26 | End: 2019-05-08 | Stop reason: HOSPADM

## 2019-04-26 RX ORDER — ENOXAPARIN SODIUM 100 MG/ML
40 INJECTION SUBCUTANEOUS EVERY 24 HOURS
Status: DISCONTINUED | OUTPATIENT
Start: 2019-04-26 | End: 2019-05-03

## 2019-04-26 RX ADMIN — SODIUM CHLORIDE, SODIUM LACTATE, POTASSIUM CHLORIDE, AND CALCIUM CHLORIDE 500 ML: .6; .31; .03; .02 INJECTION, SOLUTION INTRAVENOUS at 08:04

## 2019-04-26 RX ADMIN — ENOXAPARIN SODIUM 40 MG: 100 INJECTION SUBCUTANEOUS at 09:04

## 2019-04-26 RX ADMIN — IOHEXOL 75 ML: 350 INJECTION, SOLUTION INTRAVENOUS at 07:04

## 2019-04-26 RX ADMIN — PIPERACILLIN AND TAZOBACTAM 4.5 G: 4; .5 INJECTION, POWDER, LYOPHILIZED, FOR SOLUTION INTRAVENOUS; PARENTERAL at 07:04

## 2019-04-26 NOTE — ED PROVIDER NOTES
Encounter Date: 4/26/2019       History     Chief Complaint   Patient presents with    Fatigue     generalized weakness with not eating since hospital discharge last wee.      Zeynep Hamm is a 87 y.o. female with past medical history of Alzheimer, COPD, Cataract, Celiac disease, Diastolic HF (EF 45%) who presents for generalized fatigue, decreased PO intake, and altered mental status (increased confusion from baseline Alzheimer). History is provided by pt's adult daughter and son. They report that patient has been increasingly sleepy for most of the day, and appears to have diminished energy. She was previously was able to use the toilet independently but now requires assistance to get out of bed to the bathroom. Family also endorses decreased appetite. They deny fever, syncope, vomiting, diarrhea, recent travel, trauma, or changes in medications. Per daughter, pt was seen by home health nurse earlier this week and the assessment was negative for any acute issues. Pt's daughter states that her BP is normally in the 90s/60s.  Of note, pt was recently hospitalized for uncomplicated COPD exacerbation (4/18 - 4/20).           Review of patient's allergies indicates:   Allergen Reactions    Gluten protein      Past Medical History:   Diagnosis Date    Alzheimer disease     Bronchiectasis     Cataract     both eyes    Celiac disease     Emphysema of lung     Encounter for blood transfusion     Lung disease     Short-term memory loss      Past Surgical History:   Procedure Laterality Date    APPENDECTOMY      CATARACT EXTRACTION W/  INTRAOCULAR LENS IMPLANT Right 4/14/14    CATARACT EXTRACTION W/  INTRAOCULAR LENS IMPLANT Left 5/12/14    EXCISION-MASS Left 10/31/2013    Performed by SAL Morgan III, [facility] at [facility] 2ND FLR    EXCISION-SOFT TISSUE - right shin calcium deposits Right 8/19/2015    Performed by SAL Morgan III, MD at [facility] 2ND FLR    INSERTION, IOL PROSTHESIS Left 5/12/2014     [facility]formed by [facility] at [facility]    INSERTION, IOL PROSTHESIS Right 2014    Performed by [facility] at [facility]    PHACOEMULSIFICATION, CATARACT Left 2014    Performed by [facility] at [facility]    PHACOEMULSIFICATION, CATARACT Right 2014    Performed by [facility] at [facility]    TONSILLECTOMY      uterine suspension       Family History   Problem Relation Age of Onset    Cancer Mother     Heart disease Sister     Glaucoma Sister     Cancer Father     COPD Brother     Melanoma Neg Hx     Macular degeneration Neg Hx     Diabetes Neg Hx     Stroke Neg Hx     Thyroid disease Neg Hx     Dementia Neg Hx      Social History     Tobacco Use    Smoking status: Former Smoker     Packs/day: 2.00     Years: 35.00     Pack years: 70.00     Types: Cigarettes     Last attempt to quit: 1988     Years since quittin.9    Smokeless tobacco: Never Used   Substance Use Topics    Alcohol use: No     Alcohol/week: 0.0 oz     Comment: twice a year on special occassions    Drug use: No     Review of Systems   Unable to perform ROS: Dementia       Physical Exam     Initial Vitals [19 1235]   BP Pulse Resp Temp SpO2   (!) 85/53 (!) 113 20 97.6 °F (36.4 °C) (!) 93 %      MAP       --         Physical Exam    Nursing note and vitals reviewed.  Constitutional: No distress.   Thin elderly  female    HENT:   Head: Normocephalic and atraumatic.   Mouth/Throat: Oropharynx is clear and moist.   Eyes: Conjunctivae and EOM are normal. Pupils are equal, round, and reactive to light.   Neck: Neck supple. No tracheal deviation present. No JVD present.   Cardiovascular: Normal rate, regular rhythm and intact distal pulses.   Pulmonary/Chest: No respiratory distress. She has no wheezes. She has no rales.   Breath sounds mildly decreased globally   Abdominal: Soft. Bowel sounds are normal. She exhibits no distension. There is no tenderness. There is no rebound.    Musculoskeletal: She exhibits no edema or tenderness.   Neurological: She is alert. GCS eye subscore is 4. GCS verbal subscore is 5. GCS motor subscore is 6.   Disoriented to time and situation.  No focal neurological deficit    Skin: Skin is warm and dry.   Psychiatric: Her speech is normal. Cognition and memory are impaired. She is inattentive.         ED Course   Procedures  Labs Reviewed   CBC W/ AUTO DIFFERENTIAL - Abnormal; Notable for the following components:       Result Value    WBC 19.02 (*)     RDW 15.0 (*)     Immature Granulocytes 0.8 (*)     Gran # (ANC) 15.0 (*)     Immature Grans (Abs) 0.16 (*)     Mono # 2.1 (*)     Gran% 78.7 (*)     Lymph% 8.6 (*)     All other components within normal limits   COMPREHENSIVE METABOLIC PANEL - Abnormal; Notable for the following components:    Sodium 134 (*)     Glucose 126 (*)     Albumin 2.5 (*)     Total Bilirubin 6.3 (*)     AST 73 (*)     ALT 49 (*)     All other components within normal limits   BILIRUBIN, DIRECT - Abnormal; Notable for the following components:    Bilirubin, Direct 3.7 (*)     All other components within normal limits    Narrative:     ADD-ON BILID #[id number] PER YANG MITCHELL MD 15:10  04/26/2019    MAGNESIUM   PHOSPHORUS   TSH   LACTIC ACID, PLASMA   BILIRUBIN, DIRECT   URINALYSIS, REFLEX TO URINE CULTURE          Imaging Results    None       X-Rays:   Independently Interpreted Readings:   Other Readings:  Ultrasound of the right upper quadrant yielded a mass of the pancreas with subsequent dilatation of the common duct bile duct hepatic duct very concerning for a cancer of the pancreas there are also 2 lesions of the liver which were concerning for metastatic lesions    Medical Decision Making:   History:   I obtained history from: someone other than patient.  Old Medical Records: I decided to obtain old medical records.  Old Records Summarized: records from previous admission(s).       <> Summary of Records: Admitted 4/18 - 4/20  for COPD exacerbation. Treated with PO steroids and inhalers.   Initial Assessment:   87 y.o. Female with PMHx significant for Alzheimer, COPD, Diastolic HF (EF 45%) presenting with generalized fatigue and failure to thrive. DDx include infection, electrolyte derangement, dementia, HF exacerbation, hypothyoridism.    Clinical Tests:   Lab Tests: Ordered  Radiological Study: Ordered  Medical Tests: Ordered  ED Management:  Labs significant for WBC 19 and elevated LFTs (T bili 6.3, AST 73, ALT 43).   Direct bili ordered and RUQ US pending. Pt appears comfortable and denies any pain or discomfort.     4:09 PM: Signed out case to Dr. Alcala.                Attending Attestation:             Attending ED Notes:   Patient who had a recent admission for COPD with exacerbation now presenting with continue weakness patient evaluated in the ED with blood work which showed an elevated of total bilirubin which was quite concerning and further workup was in detailed which involved and ultrasound of the right upper quadrant which apparently showed dilated biliary ducts hepatic ducts common duct showed a mass of the pancreas which is very suggestive of an oncological source there was also noted to masses of the liver which felt that this was metastatic patient was subsequently admitted to the refer for it and the patient will need an oncological consultation             Clinical Impression:       ICD-10-CM ICD-9-CM   1. Biliary obstruction K83.1 576.2   2. Altered mental status, unspecified R41.82 780.97   3. Mass of pancreas K86.9 577.9         Disposition:   Disposition: Admitted  Condition: Serious                        Shahzad Huertas MD  04/30/19 9300

## 2019-04-26 NOTE — PROVIDER PROGRESS NOTES - EMERGENCY DEPT.
"Encounter Date: 4/26/2019    ED Physician Progress Notes           ED STAFF ATTENDING PHYSICIAN HAND-OFF NOTE:  6:07 PM 4/26/2019  Zeynep Hamm is a 87 y.o. female who presented to the ED on 4/26/2019 and has been managed by , who reports patient presented with generalized fatigue and painless jaundice. I assumed care of patient from off-going ED physician team at 6:07 PM pending CT abdomen and pelvis.  On assessment of vitals and laboratories, patient shock likely sepsis etiology warranting antibiotic treatment, which we provided.  Emergent consultation to advanced endoscopic service/GI given patient's status.  Daughter who is at bedside, reports patient has had a SBP <100mmHg "all her life".  She reports several weeks of decreased appetite and general malaise.  No reported nausea or emesis, black or bloody stools or diarrhea.    On my evaluation, Zeynep Hamm appears well, hemodynamically stable and in NAD. Thus far, Zeynep Hamm has received:  Medications   piperacillin-tazobactam 4.5 g in sodium chloride 0.9% 100 mL IVPB (ready to mix system) (has no administration in time range)       On my exam, I appreciate:  BP (!) 97/56   Pulse 78   Temp 97.6 °F (36.4 °C) (Oral)   Resp 16   Ht 5' 3" (1.6 m)   Wt 48.5 kg (107 lb)   SpO2 96%   BMI 18.95 kg/m²   PHYSICAL EXAM:  GENERAL: Calm; Cooperative; Well-appearing thin elderly female; NAD.  HEENT: AT/NC; PERRL, EOMI, +Icteric; speaking full sentences with no slurring of speech or drooling/inability to tolerate oral secretions.  NECK: Supple, FROM with no meningismus, no accessory muscle use.   HEART: Regular rate and rhythm, no M/G/T. SBP 97mmHg.  LUNGS:  O2 sat room air 95 %; No Tachypnea, No Retractions, and CTA B/L with no W/R/R.  ABDOMEN: +BS, Soft, ND, NTTP. No rigidity. No guarding. NEG Reid's, Rovsing's, or McBurney's point tenderness.  BACK: Atraumatic, No midline TTP to C/T/LS spine; No CVA tenderness B/L.  EXTREMITIES: " FROM.   SKIN: +Mild Jaundice; Warm, Dry, No Skin Tears or Rashes.  VASCULAR: 2+ pulses Prox/Dist & Symmetrical with No delay.  NEUROLOGIC: AAOx3 no acute focal deficits    Echocardiogram performed on April 20th revealed ejection fraction of 45%.  I will provide gentle IV hydration.  As patient has not urinated, has been tachycardic and hypotensive on ED arrival and has had decreased appetite and several weeks, will provide monitored bolus.  Although leukocytosis can be secondary to inflammatory of pancreatic mass, biliary obstruction and leukocytosis in the setting of tachycardia and hypotensive warrant IV antibiotics until patient's better stratified.  Will continue resuscitation and close monitoring until admission to the hospital.      Disposition: I anticipate patient will admitted to evaluate biliary obstruction likely secondary to pancreatic mass. Advanced endoscopy service consult to  I have discussed and counseled Zeynep Hamm regarding exam, results, diagnosis, treatment, and plan.  ______________________  John Bernal MD, [facility]  Emergency Medicine Staff  6:07 PM 4/26/2019    F/U: Resident, Dr. Alcala, reports discussing case with AES, who will evaluate and requested NPO status.   ____________________  John Bernal MD, [facility]  Emergency Medicine Staff  6:33 PM 4/26/2019

## 2019-04-27 PROBLEM — Z71.89 GOALS OF CARE, COUNSELING/DISCUSSION: Status: ACTIVE | Noted: 2019-04-27

## 2019-04-27 PROBLEM — K83.1 BILIARY OBSTRUCTION: Status: ACTIVE | Noted: 2019-04-27

## 2019-04-27 LAB
ALBUMIN SERPL BCP-MCNC: 2 G/DL (ref 3.5–5.2)
ALP SERPL-CCNC: 128 U/L (ref 55–135)
ALT SERPL W/O P-5'-P-CCNC: 44 U/L (ref 10–44)
ANION GAP SERPL CALC-SCNC: 10 MMOL/L (ref 8–16)
AST SERPL-CCNC: 59 U/L (ref 10–40)
BASOPHILS # BLD AUTO: 0.03 K/UL (ref 0–0.2)
BASOPHILS NFR BLD: 0.2 % (ref 0–1.9)
BILIRUB SERPL-MCNC: 6.5 MG/DL (ref 0.1–1)
BUN SERPL-MCNC: 15 MG/DL (ref 8–23)
CALCIUM SERPL-MCNC: 8.4 MG/DL (ref 8.7–10.5)
CHLORIDE SERPL-SCNC: 99 MMOL/L (ref 95–110)
CO2 SERPL-SCNC: 28 MMOL/L (ref 23–29)
CREAT SERPL-MCNC: 0.7 MG/DL (ref 0.5–1.4)
DIFFERENTIAL METHOD: ABNORMAL
EOSINOPHIL # BLD AUTO: 0.3 K/UL (ref 0–0.5)
EOSINOPHIL NFR BLD: 1.7 % (ref 0–8)
ERYTHROCYTE [DISTWIDTH] IN BLOOD BY AUTOMATED COUNT: 14.6 % (ref 11.5–14.5)
EST. GFR  (AFRICAN AMERICAN): >60 ML/MIN/1.73 M^2
EST. GFR  (NON AFRICAN AMERICAN): >60 ML/MIN/1.73 M^2
GLUCOSE SERPL-MCNC: 80 MG/DL (ref 70–110)
HCT VFR BLD AUTO: 37.1 % (ref 37–48.5)
HGB BLD-MCNC: 12 G/DL (ref 12–16)
IMM GRANULOCYTES # BLD AUTO: 0.14 K/UL (ref 0–0.04)
IMM GRANULOCYTES NFR BLD AUTO: 0.9 % (ref 0–0.5)
LIPASE SERPL-CCNC: 24 U/L (ref 4–60)
LYMPHOCYTES # BLD AUTO: 1.2 K/UL (ref 1–4.8)
LYMPHOCYTES NFR BLD: 7.9 % (ref 18–48)
MCH RBC QN AUTO: 27.9 PG (ref 27–31)
MCHC RBC AUTO-ENTMCNC: 32.3 G/DL (ref 32–36)
MCV RBC AUTO: 86 FL (ref 82–98)
MONOCYTES # BLD AUTO: 1.5 K/UL (ref 0.3–1)
MONOCYTES NFR BLD: 10.1 % (ref 4–15)
NEUTROPHILS # BLD AUTO: 11.9 K/UL (ref 1.8–7.7)
NEUTROPHILS NFR BLD: 79.2 % (ref 38–73)
NRBC BLD-RTO: 0 /100 WBC
PLATELET # BLD AUTO: 186 K/UL (ref 150–350)
PMV BLD AUTO: 10.9 FL (ref 9.2–12.9)
POTASSIUM SERPL-SCNC: 4.4 MMOL/L (ref 3.5–5.1)
PROT SERPL-MCNC: 5.5 G/DL (ref 6–8.4)
RBC # BLD AUTO: 4.3 M/UL (ref 4–5.4)
SODIUM SERPL-SCNC: 137 MMOL/L (ref 136–145)
WBC # BLD AUTO: 15.01 K/UL (ref 3.9–12.7)

## 2019-04-27 PROCEDURE — 25000242 PHARM REV CODE 250 ALT 637 W/ HCPCS: Mod: HCNC | Performed by: STUDENT IN AN ORGANIZED HEALTH CARE EDUCATION/TRAINING PROGRAM

## 2019-04-27 PROCEDURE — 36415 COLL VENOUS BLD VENIPUNCTURE: CPT | Mod: HCNC

## 2019-04-27 PROCEDURE — 85025 COMPLETE CBC W/AUTO DIFF WBC: CPT | Mod: HCNC

## 2019-04-27 PROCEDURE — 25000003 PHARM REV CODE 250: Mod: HCNC | Performed by: STUDENT IN AN ORGANIZED HEALTH CARE EDUCATION/TRAINING PROGRAM

## 2019-04-27 PROCEDURE — 63600175 PHARM REV CODE 636 W HCPCS: Mod: HCNC | Performed by: STUDENT IN AN ORGANIZED HEALTH CARE EDUCATION/TRAINING PROGRAM

## 2019-04-27 PROCEDURE — 11000001 HC ACUTE MED/SURG PRIVATE ROOM: Mod: HCNC

## 2019-04-27 PROCEDURE — 99233 PR SUBSEQUENT HOSPITAL CARE,LEVL III: ICD-10-PCS | Mod: HCNC,GC,, | Performed by: INTERNAL MEDICINE

## 2019-04-27 PROCEDURE — 99233 SBSQ HOSP IP/OBS HIGH 50: CPT | Mod: HCNC,GC,, | Performed by: INTERNAL MEDICINE

## 2019-04-27 PROCEDURE — 83690 ASSAY OF LIPASE: CPT | Mod: HCNC

## 2019-04-27 PROCEDURE — 80053 COMPREHEN METABOLIC PANEL: CPT | Mod: HCNC

## 2019-04-27 RX ORDER — MULTIVIT WITH MINERALS/HERBS
1 TABLET ORAL DAILY
Status: ON HOLD | COMMUNITY
End: 2019-05-05 | Stop reason: HOSPADM

## 2019-04-27 RX ORDER — TIOTROPIUM BROMIDE 18 UG/1
1 CAPSULE ORAL; RESPIRATORY (INHALATION) DAILY
Status: DISCONTINUED | OUTPATIENT
Start: 2019-04-27 | End: 2019-05-08 | Stop reason: HOSPADM

## 2019-04-27 RX ADMIN — PIPERACILLIN AND TAZOBACTAM 4.5 G: 4; .5 INJECTION, POWDER, LYOPHILIZED, FOR SOLUTION INTRAVENOUS; PARENTERAL at 11:04

## 2019-04-27 RX ADMIN — ENOXAPARIN SODIUM 40 MG: 100 INJECTION SUBCUTANEOUS at 05:04

## 2019-04-27 RX ADMIN — TIOTROPIUM BROMIDE 18 MCG: 18 CAPSULE ORAL; RESPIRATORY (INHALATION) at 02:04

## 2019-04-27 RX ADMIN — PIPERACILLIN AND TAZOBACTAM 4.5 G: 4; .5 INJECTION, POWDER, LYOPHILIZED, FOR SOLUTION INTRAVENOUS; PARENTERAL at 03:04

## 2019-04-27 RX ADMIN — PIPERACILLIN AND TAZOBACTAM 4.5 G: 4; .5 INJECTION, POWDER, LYOPHILIZED, FOR SOLUTION INTRAVENOUS; PARENTERAL at 07:04

## 2019-04-27 RX ADMIN — FLUTICASONE FUROATE AND VILANTEROL TRIFENATATE 1 PUFF: 100; 25 POWDER RESPIRATORY (INHALATION) at 08:04

## 2019-04-27 RX ADMIN — SODIUM CHLORIDE, SODIUM LACTATE, POTASSIUM CHLORIDE, AND CALCIUM CHLORIDE 500 ML: .6; .31; .03; .02 INJECTION, SOLUTION INTRAVENOUS at 02:04

## 2019-04-27 NOTE — PROGRESS NOTES
[facility] Hospital Medicine  Progress Note    Patient Name: Zeynep Hamm  MRN: [id number]  Patient Class: IP- Inpatient   Admission Date: 4/26/2019  Length of Stay: 1 days  Attending Physician: Michelle Nunez MD  Primary Care Provider: Maya Kelley MD    [facility] Medicine Team: Networked reference to record PCT  Nasim Paige MD    Subjective:     Principal Problem:Cholangitis    HPI:  87 years old female with past medical history of Alzheimer, COPD, Cataract, Celiac disease, Diastolic HF (EF 45%) presented to ED with fatigue.  Per patient daughter, she has been increasingly getting weaker over the last 3 weeks to a point where she is unable to stand up today and that's when the daughter decided to bring the patient to ED, at baseline patient able to walk short distance, she also has been sleeping more, eat less than usual with gradual wieght loss over coupleof weeks, noticed to have yellowish skin discoloration today, otherwise has no fever, chills, chest pain, shortness of breath, abdominal pain, nausea, vomiting, diarrhea and urinary symptoms. Patient quit smoking 35 years ago, doesn't drink alcohol, no recent surgeries or travel, has family history of leukemia and mediastinal mass.She was admitted to the hospital recently on 4/18/2019 with COPD exacerbation and new onset systolic congestive heart failure, started on lasix.  At ED patient patient was hypotensive and tachycardic, labs significant for elevated WBC, LFTs and bilirubin, U/S abdomin showed head of pancrease mass with extra and intrahepatic biliary ductal dilatation and hepatic lesions.        Hospital Course:  Admitted for suspect cholangitis, now improved on Abx awaiting EUS on Monday for pancreatic mass.     Interval History: Doing well overnight, BP and HR improved.  Patient is alert to place but does not understand why she is in the hospital.  Discussed concern for pancreatic mass and appropriate workup.  Plan to  call daughter today.  Patient denies chest pain, or abdominal pain.      Review of Systems   Constitutional: Negative for chills and fever.   Respiratory: Negative for cough.    Cardiovascular: Negative for chest pain and palpitations.   Gastrointestinal: Negative for abdominal pain.   Genitourinary: Negative for dysuria.     Objective:     Vital Signs (Most Recent):  Temp: 97.8 °F (36.6 °C) (04/27/19 1126)  Pulse: 100 (04/27/19 1137)  Resp: 18 (04/27/19 1126)  BP: 106/62 (04/27/19 1126)  SpO2: 95 % (04/27/19 1126) Vital Signs (24h Range):  Temp:  [96.2 °F (35.7 °C)-97.9 °F (36.6 °C)] 97.8 °F (36.6 °C)  Pulse:  [] 100  Resp:  [16-23] 18  SpO2:  [92 %-96 %] 95 %  BP: ()/(52-73) 106/62     Weight: 48.5 kg (107 lb)  Body mass index is 18.95 kg/m².    Intake/Output Summary (Last 24 hours) at 4/27/2019 1228  Last data filed at 4/26/2019 2230  Gross per 24 hour   Intake 600 ml   Output --   Net 600 ml      Physical Exam   Constitutional: She appears well-developed. No distress.   thin   HENT:   Head: Normocephalic and atraumatic.   Eyes: Pupils are equal, round, and reactive to light.   Neck: Neck supple.   Cardiovascular: Normal rate, regular rhythm and normal heart sounds.   Pulmonary/Chest: Effort normal and breath sounds normal. No respiratory distress.   Abdominal: Soft. Bowel sounds are normal. She exhibits no distension. There is tenderness (Very mild, RUQ).   Musculoskeletal: She exhibits no edema or tenderness.   Lymphadenopathy:     She has no cervical adenopathy.   Neurological: She is alert.   disoriented to time and situation, but alert to place.   Pleasant    Skin: She is not diaphoretic. No pallor.   slightly jaundiced   Psychiatric: She has a normal mood and affect.   Vitals reviewed.      Significant Labs: All pertinent labs within the past 24 hours have been reviewed.    Significant Imaging: I have reviewed all pertinent imaging results/findings within the past 24 hours.    Assessment/Plan:       * Cholangitis  AES aware and on board. Improved with IV Zosyn   - Plan for possible EUS Monday, will scope sooner if clinically deteriorates               Neoplasm of head of pancreas  Newly diagnosed, with what is likely mets to the liver  - Plan to discuss with daughter who per patient is POA   - Consider Heme/onc consult once path is obtained.        Goals of care, counseling/discussion  Per Pulm Outpatient notes patient is DNR, will need to clarify with daughter and Pal care was discussed previously.        COPD (chronic obstructive pulmonary disease)  Stable Patient on Combo ICS LABA and LABA LAMA outpatient   - continue ICS, LABA, Added LAMA (spiriva),   - breathing treatment as needed        Congestive heart failure (CHF)  Borderline EF with signs of diastolic dysfunction  - Holding BB given severe COPD  - BP control to help reduce risk of flash Pulm edema.      Celiac disease  Stable no active issue     Plan:   - gluten free diet         Dementia in Alzheimer's disease  Patient mental status at baseline    Plan:  - Delirium precautions  - PT/OT           VTE Risk Mitigation (From admission, onward)        Ordered     enoxaparin injection 40 mg  Daily      04/26/19 1926     IP VTE HIGH RISK PATIENT  Once      04/26/19 1926     Place CAT hose  Until discontinued      04/26/19 1845     Place sequential compression device  Until discontinued      04/26/19 1845          Dispo: Pending clinical status, plan for EUS on Monday with possible biopsy, Heme Onc to follow, Need to discuss goals with daughter     Nasim Paige MD  Department of Hospital Medicine   [facility]

## 2019-04-27 NOTE — H&P (VIEW-ONLY)
"[facility]  Gastroenterology  Consult Note    Patient Name: Zeynep Hamm  MRN: [id number]  Admission Date: 4/26/2019  Hospital Length of Stay: 1 days  Code Status: DNR   Attending Provider: Michelle Nunez MD   Consulting Provider: eCd Oquendo MD  Primary Care Physician: Maya Kelley MD  Principal Problem:Cholangitis    Inpatient consult to Advanced Endoscopy Service (AES)  Consult performed by: Ced Oquendo MD  Consult ordered by: Jina Alcala MD        Subjective:     HPI:  This is an 86 yo F with hx of Alzheimer's dementia, COPD, celiac disease, and diastolic heart failure presented to the ED yesterday for fatigue. Patient has dementia and history provided by daughter. Patient was recently admitted from 4/18 to 4/20 for shortness of breath. She was diuresed and discharged home. The daughter reports over the past few weeks she has been having decreased appetite, fatigue, and recently had a color change. On arrival to the ED, she was initially tachycardiac to 113 and hypotensive, both of which improved with fluids. She was found to have an elevated TB of 6.3, which was completely normal as of 6 days ago while admitted. She was also found to have an elevated white count of 19, however this was elevated 6 days ago at ~18. Abd US obtained which found pancreatic head mass. With concerns for cholangitis, she was started on Zosyn. CT AP obtained which found "large pancreatic head mass with central hypoattenuating component, possibly necrosis. Findings concerning for primary pancreatic neoplasm. Filling defect in the main portal vein and SMV that may be thrombosis versus tumor invasion. Innumerable large hypoattenuating liver lesions concerning for metastatic disease. Several scattered subcentimeter pulmonary nodules, also concerning for metastatic disease. Mildly dilated intrahepatic biliary ductal dilatation. Prominent dilatation of the common bile duct that appears to be compressed " "distally by the pancreatic head mass. Distended gallbladder containing a stone."    Patient and daughter deny any abdominal pain, confusion, fevers. She feels better since being here and receiving IV fluids. She is hungry and asking to eat.    Past Medical History:   Diagnosis Date    Alzheimer disease     Bronchiectasis     Cataract     both eyes    Celiac disease     Emphysema of lung     Encounter for blood transfusion     Lung disease     Short-term memory loss        Past Surgical History:   Procedure Laterality Date    APPENDECTOMY      CATARACT EXTRACTION W/  INTRAOCULAR LENS IMPLANT Right 14    CATARACT EXTRACTION W/  INTRAOCULAR LENS IMPLANT Left 14    EXCISION-MASS Left 10/31/2013    Performed by SAL Morgan III, [facility] at [facility] 2ND FLR    EXCISION-SOFT TISSUE - right shin calcium deposits Right 2015    Performed by SAL Morgan III, [facility] at [facility] 2ND FLR    INSERTION, IOL PROSTHESIS Left 2014    [facility] by [facility] at [facility]    INSERTION, IOL PROSTHESIS Right 2014    [facility]formed [facility] at [facility] [place]    PHACOEMULSIFICATION, CATARACT Left 2014    [facility]formed by [facility] at [facility] [place]    PHACOEMULSIFICATION, CATARACT Right 2014    Performed by [facility] at [facility] [place]    TONSILLECTOMY      uterine suspension         Review of patient's allergies indicates:   Allergen Reactions    Gluten protein      Family History     Problem Relation (Age of Onset)    COPD Brother    Cancer Mother, Father    Glaucoma Sister    Heart disease Sister        Tobacco Use    Smoking status: Former Smoker     Packs/day: 2.00     Years: 35.00     Pack years: 70.00     Types: Cigarettes     Last attempt to quit: 1988     Years since quittin.9    Smokeless tobacco: Never Used   Substance and Sexual Activity    Alcohol use: No     Alcohol/week: 0.0 oz     Comment: twice a year on special occassions    Drug use: No    Sexual " activity: Not Currently     Partners: Male     Review of Systems   Constitutional: Positive for activity change and appetite change. Negative for chills and fever.   HENT: Negative for sore throat and trouble swallowing.    Respiratory: Negative for cough and shortness of breath.    Cardiovascular: Negative for chest pain and leg swelling.   Gastrointestinal: Negative for abdominal distention, abdominal pain, blood in stool, constipation, diarrhea, nausea and vomiting.   Genitourinary: Negative for decreased urine volume and difficulty urinating.   Musculoskeletal: Negative for arthralgias and back pain.   Skin: Positive for color change. Negative for pallor.   Neurological: Positive for weakness. Negative for dizziness and light-headedness.   Psychiatric/Behavioral: Negative for agitation and confusion.     Objective:     Vital Signs (Most Recent):  Temp: 97.8 °F (36.6 °C) (04/27/19 1126)  Pulse: 86 (04/27/19 1408)  Resp: 16 (04/27/19 1408)  BP: 106/62 (04/27/19 1126)  SpO2: 95 % (04/27/19 1126) Vital Signs (24h Range):  Temp:  [96.2 °F (35.7 °C)-97.9 °F (36.6 °C)] 97.8 °F (36.6 °C)  Pulse:  [] 86  Resp:  [16-23] 16  SpO2:  [92 %-96 %] 95 %  BP: ()/(52-73) 106/62     Weight: 48.5 kg (107 lb) (04/26/19 1235)  Body mass index is 18.95 kg/m².      Intake/Output Summary (Last 24 hours) at 4/27/2019 1518  Last data filed at 4/27/2019 1411  Gross per 24 hour   Intake 700 ml   Output --   Net 700 ml       Lines/Drains/Airways     Peripheral Intravenous Line                 Peripheral IV - Single Lumen 04/26/19 1750 22 G Left Forearm less than 1 day                Physical Exam   Constitutional: She is oriented to person, place, and time.   Well appearing, no apparent distress  Resting in bed comfortably   HENT:   Mouth/Throat: Oropharynx is clear and moist.   Eyes: Scleral icterus is present.   Cardiovascular: Normal rate and regular rhythm.   Pulmonary/Chest: Effort normal and breath sounds normal. No  respiratory distress.   Abdominal: Soft. Bowel sounds are normal. She exhibits no distension and no mass. There is no tenderness. There is no guarding.   Musculoskeletal: She exhibits no edema or deformity.   Neurological: She is alert and oriented to person, place, and time.   Skin: Skin is warm and dry.   jaundice   Psychiatric: She has a normal mood and affect.   Vitals reviewed.      Significant Labs:  CBC:   Recent Labs   Lab 04/26/19  1345 04/27/19  0524   WBC 19.02* 15.01*   HGB 13.4 12.0   HCT 41.4 37.1    186     CMP:   Recent Labs   Lab 04/27/19  0524   GLU 80   CALCIUM 8.4*   ALBUMIN 2.0*   PROT 5.5*      K 4.4   CO2 28   CL 99   BUN 15   CREATININE 0.7   ALKPHOS 128   ALT 44   AST 59*   BILITOT 6.5*     Coagulation: No results for input(s): PT, INR, APTT in the last 48 hours.        Assessment/Plan:     Biliary obstruction  88 yo F with hx of Alzheimer's dementia, COPD, celiac disease, and diastolic heart failure presented to the ED yesterday for change in color, decrease appetite, and fatigue over the past few weeks. Imaging here found to have a pancreatic head mass with biliary obstruction. TB now elevated to 6.5 and was completely normal six days ago. Low suspicion for cholangitis given no abdominal pain, fevers, or altered mental status. Hypotension and tachycardiaon arrival likely more related to dehydration given poor appetite for the past week. Additionally, though she does have a leukocytosis, this was present six days ago when her bilirubin was normal.     Recommendations:  - Ok to continue antibiotics empirically  - Continue fluid resuscitation as cardiac function allows  - Regular diet  - Plan for ERCP and EUS Monday          Thank you for your consult. I will follow-up with patient. Please contact us if you have any additional questions.    Ced Oquendo MD  Gastroenterology  [facility]

## 2019-04-27 NOTE — SUBJECTIVE & OBJECTIVE
Past Medical History:   Diagnosis Date    Alzheimer disease     Bronchiectasis     Cataract     both eyes    Celiac disease     Emphysema of lung     Encounter for blood transfusion     Lung disease     Short-term memory loss        Past Surgical History:   Procedure Laterality Date    APPENDECTOMY      CATARACT EXTRACTION W/  INTRAOCULAR LENS IMPLANT Right 14    CATARACT EXTRACTION W/  INTRAOCULAR LENS IMPLANT Left 14    EXCISION-MASS Left 10/31/2013    Performed by SAL Morgan III, MD at [facility] 2ND FLR    EXCISION-SOFT TISSUE - right shin calcium deposits Right 2015    Performed by SAL Morgan III, MD at [facility] 2ND FLR    INSERTION, IOL PROSTHESIS Left 2014    [facility]formed by [facility] at [facility] [place]    INSERTION, IOL PROSTHESIS Right 2014    [facility]formed by [facility] at [facility] [place]    PHACOEMULSIFICATION, CATARACT Left 2014    Performed by [facility] at [facility] [place]    PHACOEMULSIFICATION, CATARACT Right 2014    Performed by [facility] at [facility] [place]    TONSILLECTOMY      uterine suspension         Review of patient's allergies indicates:   Allergen Reactions    Gluten protein      Family History     Problem Relation (Age of Onset)    COPD Brother    Cancer Mother, Father    Glaucoma Sister    Heart disease Sister        Tobacco Use    Smoking status: Former Smoker     Packs/day: 2.00     Years: 35.00     Pack years: 70.00     Types: Cigarettes     Last attempt to quit: 1988     Years since quittin.9    Smokeless tobacco: Never Used   Substance and Sexual Activity    Alcohol use: No     Alcohol/week: 0.0 oz     Comment: twice a year on special occassions    Drug use: No    Sexual activity: Not Currently     Partners: Male     Review of Systems   Constitutional: Positive for activity change and appetite change. Negative for chills and fever.   HENT: Negative for sore throat and trouble swallowing.    Respiratory: Negative for  cough and shortness of breath.    Cardiovascular: Negative for chest pain and leg swelling.   Gastrointestinal: Negative for abdominal distention, abdominal pain, blood in stool, constipation, diarrhea, nausea and vomiting.   Genitourinary: Negative for decreased urine volume and difficulty urinating.   Musculoskeletal: Negative for arthralgias and back pain.   Skin: Positive for color change. Negative for pallor.   Neurological: Positive for weakness. Negative for dizziness and light-headedness.   Psychiatric/Behavioral: Negative for agitation and confusion.     Objective:     Vital Signs (Most Recent):  Temp: 97.8 °F (36.6 °C) (04/27/19 1126)  Pulse: 86 (04/27/19 1408)  Resp: 16 (04/27/19 1408)  BP: 106/62 (04/27/19 1126)  SpO2: 95 % (04/27/19 1126) Vital Signs (24h Range):  Temp:  [96.2 °F (35.7 °C)-97.9 °F (36.6 °C)] 97.8 °F (36.6 °C)  Pulse:  [] 86  Resp:  [16-23] 16  SpO2:  [92 %-96 %] 95 %  BP: ()/(52-73) 106/62     Weight: 48.5 kg (107 lb) (04/26/19 1235)  Body mass index is 18.95 kg/m².      Intake/Output Summary (Last 24 hours) at 4/27/2019 1518  Last data filed at 4/27/2019 1411  Gross per 24 hour   Intake 700 ml   Output --   Net 700 ml       Lines/Drains/Airways     Peripheral Intravenous Line                 Peripheral IV - Single Lumen 04/26/19 1750 22 G Left Forearm less than 1 day                Physical Exam   Constitutional: She is oriented to person, place, and time.   Well appearing, no apparent distress  Resting in bed comfortably   HENT:   Mouth/Throat: Oropharynx is clear and moist.   Eyes: Scleral icterus is present.   Cardiovascular: Normal rate and regular rhythm.   Pulmonary/Chest: Effort normal and breath sounds normal. No respiratory distress.   Abdominal: Soft. Bowel sounds are normal. She exhibits no distension and no mass. There is no tenderness. There is no guarding.   Musculoskeletal: She exhibits no edema or deformity.   Neurological: She is alert and oriented to  person, place, and time.   Skin: Skin is warm and dry.   jaundice   Psychiatric: She has a normal mood and affect.   Vitals reviewed.      Significant Labs:  CBC:   Recent Labs   Lab 04/26/19  1345 04/27/19  0524   WBC 19.02* 15.01*   HGB 13.4 12.0   HCT 41.4 37.1    186     CMP:   Recent Labs   Lab 04/27/19  0524   GLU 80   CALCIUM 8.4*   ALBUMIN 2.0*   PROT 5.5*      K 4.4   CO2 28   CL 99   BUN 15   CREATININE 0.7   ALKPHOS 128   ALT 44   AST 59*   BILITOT 6.5*     Coagulation: No results for input(s): PT, INR, APTT in the last 48 hours.

## 2019-04-27 NOTE — HOSPITAL COURSE
Admitted for suspected cholangitis, From primary pancreatic adenocarcinoma and innumerable large liver lesions concerning for metastatic disease. She is on Ciprofloxacin and Flagyl for acute cholangitis which was completed. She is transitioned to hospice due to poor prognosis and emphasis on comfort care; is being discharge to [facility] hospice. She will continue Apixaban for B/L DVT, ativan and zofran as needed.

## 2019-04-27 NOTE — SUBJECTIVE & OBJECTIVE
Interval History: Doing well overnight, BP and HR improved.  Patient is alert to place but does not understand why she is in the hospital.  Discussed concern for pancreatic mass and appropriate workup.  Plan to call daughter today.  Patient denies chest pain, or abdominal pain.      Review of Systems   Constitutional: Negative for chills and fever.   Respiratory: Negative for cough.    Cardiovascular: Negative for chest pain and palpitations.   Gastrointestinal: Negative for abdominal pain.   Genitourinary: Negative for dysuria.     Objective:     Vital Signs (Most Recent):  Temp: 97.8 °F (36.6 °C) (04/27/19 1126)  Pulse: 100 (04/27/19 1137)  Resp: 18 (04/27/19 1126)  BP: 106/62 (04/27/19 1126)  SpO2: 95 % (04/27/19 1126) Vital Signs (24h Range):  Temp:  [96.2 °F (35.7 °C)-97.9 °F (36.6 °C)] 97.8 °F (36.6 °C)  Pulse:  [] 100  Resp:  [16-23] 18  SpO2:  [92 %-96 %] 95 %  BP: ()/(52-73) 106/62     Weight: 48.5 kg (107 lb)  Body mass index is 18.95 kg/m².    Intake/Output Summary (Last 24 hours) at 4/27/2019 1228  Last data filed at 4/26/2019 2230  Gross per 24 hour   Intake 600 ml   Output --   Net 600 ml      Physical Exam   Constitutional: She appears well-developed. No distress.   thin   HENT:   Head: Normocephalic and atraumatic.   Eyes: Pupils are equal, round, and reactive to light.   Neck: Neck supple.   Cardiovascular: Normal rate, regular rhythm and normal heart sounds.   Pulmonary/Chest: Effort normal and breath sounds normal. No respiratory distress.   Abdominal: Soft. Bowel sounds are normal. She exhibits no distension. There is tenderness (Very mild, RUQ).   Musculoskeletal: She exhibits no edema or tenderness.   Lymphadenopathy:     She has no cervical adenopathy.   Neurological: She is alert.   disoriented to time and situation, but alert to place.   Pleasant    Skin: She is not diaphoretic. No pallor.   slightly jaundiced   Psychiatric: She has a normal mood and affect.   Vitals  reviewed.      Significant Labs: All pertinent labs within the past 24 hours have been reviewed.    Significant Imaging: I have reviewed all pertinent imaging results/findings within the past 24 hours.

## 2019-04-27 NOTE — ASSESSMENT & PLAN NOTE
88 yo F with hx of Alzheimer's dementia, COPD, celiac disease, and diastolic heart failure presented to the ED yesterday for change in color, decrease appetite, and fatigue over the past few weeks. Imaging here found to have a pancreatic head mass with biliary obstruction. TB now elevated to 6.5 and was completely normal six days ago. Low suspicion for cholangitis given no abdominal pain, fevers, or altered mental status. Hypotension and tachycardiaon arrival likely more related to dehydration given poor appetite for the past week. Additionally, though she does have a leukocytosis, this was present six days ago when her bilirubin was normal.     Recommendations:  - Ok to continue antibiotics empirically  - Continue fluid resuscitation as cardiac function allows  - Regular diet  - Plan for ERCP and EUS Monday

## 2019-04-27 NOTE — ASSESSMENT & PLAN NOTE
Stable Patient on Combo ICS LABA and LABA LAMA outpatient   - continue ICS, LABA, Added LAMA (spiriva),   - breathing treatment as needed

## 2019-04-27 NOTE — HPI
87 years old female with past medical history of Alzheimer, COPD, Cataract, Celiac disease, Diastolic HF (EF 45%) presented to ED with fatigue.  Per patient daughter, she has been increasingly getting weaker over the last 3 weeks to a point where she is unable to stand up today and that's when the daughter decided to bring the patient to ED, at baseline patient able to walk short distance, she also has been sleeping more, eat less than usual with gradual wieght loss over coupleof weeks, noticed to have yellowish skin discoloration today, otherwise has no fever, chills, chest pain, shortness of breath, abdominal pain, nausea, vomiting, diarrhea and urinary symptoms. Patient quit smoking 35 years ago, doesn't drink alcohol, no recent surgeries or travel, has family history of leukemia and mediastinal mass.She was admitted to the hospital recently on 4/18/2019 with COPD exacerbation and new onset systolic congestive heart failure, started on lasix.  At ED patient patient was hypotensive and tachycardic, labs significant for elevated WBC, LFTs and bilirubin, U/S abdomin showed head of pancrease mass with extra and intrahepatic biliary ductal dilatation and hepatic lesions.

## 2019-04-27 NOTE — PROGRESS NOTES
Pharmacist Renal Dose Adjustment Note    Zeynep Hamm is a 87 y.o. female being treated with piperacillin/tazobactam for intra-abdominal infection     Patient Data:    Vital Signs (Most Recent):  Temp: 97.9 °F (36.6 °C) (04/26/19 1946)  Pulse: 88 (04/26/19 2001)  Resp: (!) 21 (04/26/19 1944)  BP: (!) 105/56 (04/26/19 2001)  SpO2: 95 % (04/26/19 2001)   Vital Signs (72h Range):  Temp:  [97.6 °F (36.4 °C)-97.9 °F (36.6 °C)]   Pulse:  []   Resp:  [16-21]   BP: ()/(53-59)   SpO2:  [93 %-96 %]      Recent Labs   Lab 04/20/19  0538 04/26/19  1345   CREATININE 0.9 0.7     Serum creatinine: 0.7 mg/dL 04/26/19 1345  Estimated creatinine clearance: 43.4 mL/min    Piperacillin/tazobactam 4.5 grams IVPB traditional infusion every 6 hours every will be changed to piperacillin/tazobactam 4.5 grams IVPB extended infusion every 8 hours    Pharmacist's Name: Ever Martini  Pharmacist's Extension: [phone]

## 2019-04-27 NOTE — ASSESSMENT & PLAN NOTE
Newly diagnosed, with what is likely mets to the liver  - Plan to discuss with daughter who per patient is POA   - Consider Heme/onc consult once path is obtained.

## 2019-04-27 NOTE — ASSESSMENT & PLAN NOTE
Borderline EF with signs of diastolic dysfunction  - Holding BB given severe COPD  - BP control to help reduce risk of flash Pulm edema.

## 2019-04-27 NOTE — ASSESSMENT & PLAN NOTE
AES aware and on board. Improved with IV Zosyn   - Plan for possible EUS Monday, will scope sooner if clinically deteriorates

## 2019-04-27 NOTE — HPI
"This is an 88 yo F with hx of Alzheimer's dementia, COPD, celiac disease, and diastolic heart failure presented to the ED yesterday for fatigue. Patient has dementia and history provided by daughter. Patient was recently admitted from 4/18 to 4/20 for shortness of breath. She was diuresed and discharged home. The daughter reports over the past few weeks she has been having decreased appetite, fatigue, and recently had a color change. On arrival to the ED, she was initially tachycardiac to 113 and hypotensive, both of which improved with fluids. She was found to have an elevated TB of 6.3, which was completely normal as of 6 days ago while admitted. She was also found to have an elevated white count of 19, however this was elevated 6 days ago at ~18. Abd US obtained which found pancreatic head mass. With concerns for cholangitis, she was started on Zosyn. CT AP obtained which found "large pancreatic head mass with central hypoattenuating component, possibly necrosis. Findings concerning for primary pancreatic neoplasm. Filling defect in the main portal vein and SMV that may be thrombosis versus tumor invasion. Innumerable large hypoattenuating liver lesions concerning for metastatic disease. Several scattered subcentimeter pulmonary nodules, also concerning for metastatic disease. Mildly dilated intrahepatic biliary ductal dilatation. Prominent dilatation of the common bile duct that appears to be compressed distally by the pancreatic head mass. Distended gallbladder containing a stone."    Patient and daughter deny any abdominal pain, confusion, fevers. She feels better since being here and receiving IV fluids. She is hungry and asking to eat.  "

## 2019-04-27 NOTE — SUBJECTIVE & OBJECTIVE
Past Medical History:   Diagnosis Date    Alzheimer disease     Bronchiectasis     Cataract     both eyes    Celiac disease     Emphysema of lung     Encounter for blood transfusion     Lung disease     Short-term memory loss        Past Surgical History:   Procedure Laterality Date    APPENDECTOMY      CATARACT EXTRACTION W/  INTRAOCULAR LENS IMPLANT Right 4/14/14    CATARACT EXTRACTION W/  INTRAOCULAR LENS IMPLANT Left 5/12/14    EXCISION-MASS Left 10/31/2013    Performed by SAL Morgan III, MD [facility] 2ND FLR    EXCISION-SOFT TISSUE - right shin calcium deposits Right 8/19/2015    Performed by SAL [facility] at [facility] 2ND FLR    INSERTION, IOL PROSTHESIS Left 5/12/2014    Performed by [facility] at [facility]    INSERTION, IOL PROSTHESIS Right 4/14/2014    [facility]formed by [facility] at [facility]    PHACOEMULSIFICATION, CATARACT Left 5/12/2014    [facility]formed [facility] at [facility]    PHACOEMULSIFICATION, CATARACT Right 4/14/2014    Performed by [facility] at [facility]    TONSILLECTOMY      uterine suspension         Review of patient's allergies indicates:   Allergen Reactions    Gluten protein        No current facility-administered medications on file prior to encounter.      Current Outpatient Medications on File Prior to Encounter   Medication Sig    albuterol 90 mcg/actuation inhaler Inhale 2 puffs into the lungs every 4 (four) hours as needed for Wheezing.    albuterol-ipratropium (DUO-NEB) 2.5 mg-0.5 mg/3 mL nebulizer solution Take 3 mLs by nebulization every 6 (six) hours as needed for Wheezing or Shortness of Breath. Rescue    multivitamin capsule Take 1 capsule by mouth once daily.    SYMBICORT 160-4.5 mcg/actuation HFAA 2 (two) times daily.     acetaminophen (TYLENOL) 325 MG tablet Take 2 tablets (650 mg total) by mouth every 6 (six) hours as needed.    furosemide (LASIX) 20 MG tablet Take 1 tablet (20 mg total) by mouth as needed. For Weight  Gain > 2-3 lbs in 1 day or 4-6 lbs, please give lasix 20mg PO as needed    umeclidinium-vilanterol (ANORO ELLIPTA) 62.5-25 mcg/actuation DsDv Inhale 1 puff into the lungs once daily. Controller     Family History     Problem Relation (Age of Onset)    COPD Brother    Cancer Mother, Father    Glaucoma Sister    Heart disease Sister        Tobacco Use    Smoking status: Former Smoker     Packs/day: 2.00     Years: 35.00     Pack years: 70.00     Types: Cigarettes     Last attempt to quit: 1988     Years since quittin.9    Smokeless tobacco: Never Used   Substance and Sexual Activity    Alcohol use: No     Alcohol/week: 0.0 oz     Comment: twice a year on special occassions    Drug use: No    Sexual activity: Not Currently     Partners: Male     Review of Systems   Constitutional: Positive for activity change and fatigue. Negative for diaphoresis and fever.   HENT: Negative for congestion and sore throat.    Eyes: Negative for photophobia and visual disturbance.   Respiratory: Negative for apnea and shortness of breath.    Cardiovascular: Negative for chest pain and palpitations.   Gastrointestinal: Negative for abdominal distention and abdominal pain.   Endocrine: Negative for polyphagia and polyuria.   Genitourinary: Negative for difficulty urinating and dysuria.   Musculoskeletal: Negative for arthralgias and gait problem.   Neurological: Negative for dizziness and headaches.   Psychiatric/Behavioral: Negative for agitation and behavioral problems.     Objective:     Vital Signs (Most Recent):  Temp: 97.9 °F (36.6 °C) (19)  Pulse: 88 (19)  Resp: (!) 21 (19)  BP: (!) 105/56 (19)  SpO2: 95 % (19) Vital Signs (24h Range):  Temp:  [97.6 °F (36.4 °C)-97.9 °F (36.6 °C)] 97.9 °F (36.6 °C)  Pulse:  [] 88  Resp:  [16-21] 21  SpO2:  [93 %-96 %] 95 %  BP: ()/(53-59) 105/56     Weight: 48.5 kg (107 lb)  Body mass index is 18.95 kg/m².    Physical  Exam   Constitutional: She appears well-developed. No distress.   thin   HENT:   Head: Normocephalic and atraumatic.   Eyes: Pupils are equal, round, and reactive to light. Scleral icterus is present.   Neck: Neck supple.   Cardiovascular: Normal rate, regular rhythm and normal heart sounds.   Pulmonary/Chest: Effort normal and breath sounds normal. No respiratory distress.   Abdominal: Soft. Bowel sounds are normal. She exhibits no distension. There is tenderness (mild RUQ).   Musculoskeletal: She exhibits no edema or tenderness.   Lymphadenopathy:     She has no cervical adenopathy.   Neurological: She is alert.   disoriented to time and situation    Skin: She is not diaphoretic. No pallor.   slightly jaundiced   Psychiatric: She has a normal mood and affect.   Vitals reviewed.        CRANIAL NERVES     CN III, IV, VI   Pupils are equal, round, and reactive to light.       Significant Labs:   Bilirubin:   Recent Labs   Lab 04/18/19  1145 04/19/19  0449 04/20/19  0538 04/26/19  1345   BILIDIR  --   --   --  3.7*   BILITOT 1.1* 0.4 0.3 6.3*     CBC:   Recent Labs   Lab 04/26/19  1345   WBC 19.02*   HGB 13.4   HCT 41.4        CMP:   Recent Labs   Lab 04/26/19  1345   *   K 4.5   CL 98   CO2 25   *   BUN 14   CREATININE 0.7   CALCIUM 9.0   PROT 6.9   ALBUMIN 2.5*   BILITOT 6.3*   ALKPHOS 129   AST 73*   ALT 49*   ANIONGAP 11   EGFRNONAA >60.0       Significant Imaging: I have reviewed all pertinent imaging results/findings within the past 24 hours.

## 2019-04-27 NOTE — ASSESSMENT & PLAN NOTE
Neoplasm of head of pancreas  Patient presented with progressive fatique, hypotension and tachycardia, labs significant for elevated WBC, LFTs and bilirubin, U/S abdomin showed head of pancrease mass with extra and intrahepatic biliary ductal dilatation and hepatic lesions.  CT abdomin consistent with finding on ultrasound abdomen.    Plan:  - IVF  - Abx : zosyn   - daily CMP  - AES consult  - Hem/onc consult afterward.

## 2019-04-27 NOTE — H&P
[facility] Hospital Medicine  History & Physical    Patient Name: Zeynep Hamm  MRN: [id number]  Admission Date: 4/26/2019  Attending Physician: No att. providers found   Primary Care Provider: Maya Kelley MD    [facility] Medicine Team: Networked reference to record PCT  URSULA Yao     Patient information was obtained from relative(s) and past medical records.     Subjective:     Principal Problem:Cholangitis    Chief Complaint:   Chief Complaint   Patient presents with    Fatigue     generalized weakness with not eating since hospital discharge last wee.         HPI: 87 years old female with past medical history of Alzheimer, COPD, Cataract, Celiac disease, Diastolic HF (EF 45%) presented to ED with fatigue.  Per patient daughter, she has been increasingly getting weaker over the last 3 weeks to a point where she is unable to stand up today and that's when the daughter decided to bring the patient to ED, at baseline patient able to walk short distance, she also has been sleeping more, eat less than usual with gradual wieght loss over coupleof weeks, noticed to have yellowish skin discoloration today, otherwise has no fever, chills, chest pain, shortness of breath, abdominal pain, nausea, vomiting, diarrhea and urinary symptoms. Patient quit smoking 35 years ago, doesn't drink alcohol, no recent surgeries or travel, has family history of leukemia and mediastinal mass.She was admitted to the hospital recently on 4/18/2019 with COPD exacerbation and new onset systolic congestive heart failure, started on lasix.  At ED patient patient was hypotensive and tachycardic, labs significant for elevated WBC, LFTs and bilirubin, U/S abdomin showed head of pancrease mass with extra and intrahepatic biliary ductal dilatation and hepatic lesions.        Past Medical History:   Diagnosis Date    Alzheimer disease     Bronchiectasis     Cataract     both eyes    Celiac disease     Emphysema  of lung     Encounter for blood transfusion     Lung disease     Short-term memory loss        Past Surgical History:   Procedure Laterality Date    APPENDECTOMY      CATARACT EXTRACTION W/  INTRAOCULAR LENS IMPLANT Right 4/14/14    CATARACT EXTRACTION W/  INTRAOCULAR LENS IMPLANT Left 5/12/14    EXCISION-MASS Left 10/31/2013    Performed by SAL [facility] at [facility] 2ND FLR    EXCISION-SOFT TISSUE - right shin calcium deposits Right 8/19/2015    Performed by SAL Morgan III, [facility] at [facility] 2ND FLR    INSERTION, IOL PROSTHESIS Left 5/12/2014    [facility]    INSERTION, IOL PROSTHESIS Right 4/14/2014    [facility]formed by [facility] at [facility]    PHACOEMULSIFICATION, CATARACT Left 5/12/2014    [facility]formed by [facility] at [facility]    PHACOEMULSIFICATION, CATARACT Right 4/14/2014    Performed by [facility] at [facility]    TONSILLECTOMY      uterine suspension         Review of patient's allergies indicates:   Allergen Reactions    Gluten protein        No current facility-administered medications on file prior to encounter.      Current Outpatient Medications on File Prior to Encounter   Medication Sig    albuterol 90 mcg/actuation inhaler Inhale 2 puffs into the lungs every 4 (four) hours as needed for Wheezing.    albuterol-ipratropium (DUO-NEB) 2.5 mg-0.5 mg/3 mL nebulizer solution Take 3 mLs by nebulization every 6 (six) hours as needed for Wheezing or Shortness of Breath. Rescue    multivitamin capsule Take 1 capsule by mouth once daily.    SYMBICORT 160-4.5 mcg/actuation HFAA 2 (two) times daily.     acetaminophen (TYLENOL) 325 MG tablet Take 2 tablets (650 mg total) by mouth every 6 (six) hours as needed.    furosemide (LASIX) 20 MG tablet Take 1 tablet (20 mg total) by mouth as needed. For Weight Gain > 2-3 lbs in 1 day or 4-6 lbs, please give lasix 20mg PO as needed    umeclidinium-vilanterol (ANORO ELLIPTA) 62.5-25 mcg/actuation DsDv  Inhale 1 puff into the lungs once daily. Controller     Family History     Problem Relation (Age of Onset)    COPD Brother    Cancer Mother, Father    Glaucoma Sister    Heart disease Sister        Tobacco Use    Smoking status: Former Smoker     Packs/day: 2.00     Years: 35.00     Pack years: 70.00     Types: Cigarettes     Last attempt to quit: 1988     Years since quittin.9    Smokeless tobacco: Never Used   Substance and Sexual Activity    Alcohol use: No     Alcohol/week: 0.0 oz     Comment: twice a year on special occassions    Drug use: No    Sexual activity: Not Currently     Partners: Male     Review of Systems   Constitutional: Positive for activity change and fatigue. Negative for diaphoresis and fever.   HENT: Negative for congestion and sore throat.    Eyes: Negative for photophobia and visual disturbance.   Respiratory: Negative for apnea and shortness of breath.    Cardiovascular: Negative for chest pain and palpitations.   Gastrointestinal: Negative for abdominal distention and abdominal pain.   Endocrine: Negative for polyphagia and polyuria.   Genitourinary: Negative for difficulty urinating and dysuria.   Musculoskeletal: Negative for arthralgias and gait problem.   Neurological: Negative for dizziness and headaches.   Psychiatric/Behavioral: Negative for agitation and behavioral problems.     Objective:     Vital Signs (Most Recent):  Temp: 97.9 °F (36.6 °C) (19)  Pulse: 88 (19)  Resp: (!) 21 (19)  BP: (!) 105/56 (19)  SpO2: 95 % (19) Vital Signs (24h Range):  Temp:  [97.6 °F (36.4 °C)-97.9 °F (36.6 °C)] 97.9 °F (36.6 °C)  Pulse:  [] 88  Resp:  [16-21] 21  SpO2:  [93 %-96 %] 95 %  BP: ()/(53-59) 105/56     Weight: 48.5 kg (107 lb)  Body mass index is 18.95 kg/m².    Physical Exam   Constitutional: She appears well-developed. No distress.   thin   HENT:   Head: Normocephalic and atraumatic.   Eyes: Pupils are equal,  round, and reactive to light. Scleral icterus is present.   Neck: Neck supple.   Cardiovascular: Normal rate, regular rhythm and normal heart sounds.   Pulmonary/Chest: Effort normal and breath sounds normal. No respiratory distress.   Abdominal: Soft. Bowel sounds are normal. She exhibits no distension. There is tenderness (mild RUQ).   Musculoskeletal: She exhibits no edema or tenderness.   Lymphadenopathy:     She has no cervical adenopathy.   Neurological: She is alert.   disoriented to time and situation    Skin: She is not diaphoretic. No pallor.   slightly jaundiced   Psychiatric: She has a normal mood and affect.   Vitals reviewed.        CRANIAL NERVES     CN III, IV, VI   Pupils are equal, round, and reactive to light.       Significant Labs:   Bilirubin:   Recent Labs   Lab 04/18/19  1145 04/19/19  0449 04/20/19  0538 04/26/19  1345   BILIDIR  --   --   --  3.7*   BILITOT 1.1* 0.4 0.3 6.3*     CBC:   Recent Labs   Lab 04/26/19  1345   WBC 19.02*   HGB 13.4   HCT 41.4        CMP:   Recent Labs   Lab 04/26/19  1345   *   K 4.5   CL 98   CO2 25   *   BUN 14   CREATININE 0.7   CALCIUM 9.0   PROT 6.9   ALBUMIN 2.5*   BILITOT 6.3*   ALKPHOS 129   AST 73*   ALT 49*   ANIONGAP 11   EGFRNONAA >60.0       Significant Imaging: I have reviewed all pertinent imaging results/findings within the past 24 hours.    Assessment/Plan:     * Cholangitis  Neoplasm of head of pancreas  Patient presented with progressive fatique, hypotension and tachycardia, labs significant for elevated WBC, LFTs and bilirubin, U/S abdomin showed head of pancrease mass with extra and intrahepatic biliary ductal dilatation and hepatic lesions.  CT abdomin consistent with finding on ultrasound abdomen.    Plan:  - IVF  - Abx : zosyn   - daily CMP  - AES consult  - Hem/onc consult afterward.           COPD (chronic obstructive pulmonary disease)  Stable   No acute exacerbation, no wheezes  No acute changed on chest x-ray      Plan:  - continue home inhalers   - breathing treatment as needed        Congestive heart failure (CHF)  Boderline reduced EF 45%  Stable   No signes of fluid overload     Plan:  - PRN Lasix 20mg for weight gain >3-4 lbs  - check weight 3-4 times a week  - Hold off on GDMT per cards res  - No BB due to severe COPD      Celiac disease  Stable no active issue     Plan:   - gluten free diet         Dementia in Alzheimer's disease  Patient mental status at baseline    Plan:  - Delirium precautions  - PT/OT             VTE Risk Mitigation (From admission, onward)        Ordered     enoxaparin injection 40 mg  Daily      04/26/19 1926     IP VTE HIGH RISK PATIENT  Once      04/26/19 1926     Place CAT hose  Until discontinued      04/26/19 1845     Place sequential compression device  Until discontinued      04/26/19 1845             URSULA Yao  Department of Hospital Medicine   [facility]

## 2019-04-27 NOTE — CONSULTS
"[facility]  Gastroenterology  Consult Note    Patient Name: Zeynep Hamm  MRN: [id number]  Admission Date: 4/26/2019  Hospital Length of Stay: 1 days  Code Status: DNR   Attending Provider: Michelle Nunez MD   Consulting Provider: Ced Oquendo MD  Primary Care Physician: Maya Kelley MD  Principal Problem:Cholangitis    Inpatient consult to [facility] ([facility])  Consult performed by: Ced Oquendo MD  Consult ordered by: Jina Alcala MD        Subjective:     HPI:  This is an 88 yo F with hx of Alzheimer's dementia, COPD, celiac disease, and diastolic heart failure presented to the ED yesterday for fatigue. Patient has dementia and history provided by daughter. Patient was recently admitted from 4/18 to 4/20 for shortness of breath. She was diuresed and discharged home. The daughter reports over the past few weeks she has been having decreased appetite, fatigue, and recently had a color change. On arrival to the ED, she was initially tachycardiac to 113 and hypotensive, both of which improved with fluids. She was found to have an elevated TB of 6.3, which was completely normal as of 6 days ago while admitted. She was also found to have an elevated white count of 19, however this was elevated 6 days ago at ~18. Abd US obtained which found pancreatic head mass. With concerns for cholangitis, she was started on Zosyn. CT AP obtained which found "large pancreatic head mass with central hypoattenuating component, possibly necrosis. Findings concerning for primary pancreatic neoplasm. Filling defect in the main portal vein and SMV that may be thrombosis versus tumor invasion. Innumerable large hypoattenuating liver lesions concerning for metastatic disease. Several scattered subcentimeter pulmonary nodules, also concerning for metastatic disease. Mildly dilated intrahepatic biliary ductal dilatation. Prominent dilatation of the common bile duct that appears to be compressed " "distally by the pancreatic head mass. Distended gallbladder containing a stone."    Patient and daughter deny any abdominal pain, confusion, fevers. She feels better since being here and receiving IV fluids. She is hungry and asking to eat.    Past Medical History:   Diagnosis Date    Alzheimer disease     Bronchiectasis     Cataract     both eyes    Celiac disease     Emphysema of lung     Encounter for blood transfusion     Lung disease     Short-term memory loss        Past Surgical History:   Procedure Laterality Date    APPENDECTOMY      CATARACT EXTRACTION W/  INTRAOCULAR LENS IMPLANT Right 14    CATARACT EXTRACTION W/  INTRAOCULAR LENS IMPLANT Left 14    EXCISION-MASS Left 10/31/2013    Performed by SAL Morgan III, MD at [facility] OR 2ND FLR    EXCISION-SOFT TISSUE - right shin calcium deposits Right 2015    Performed by SAL Morgan III, MD at [facility] 2ND FLR    INSERTION, IOL PROSTHESIS Left 2014    Performed by Rovetro Flores MD at [facility] [place] [place]    INSERTION, IOL PROSTHESIS Right 2014    Performed by Roverto Flores MD at [facility] [place] [place]    PHACOEMULSIFICATION, CATARACT Left 2014    Performed by Roverto Flores MD at [facility] [place] [place]    PHACOEMULSIFICATION, CATARACT Right 2014    Performed by Roverto Flores MD at [facility] [place] [place]    TONSILLECTOMY      uterine suspension         Review of patient's allergies indicates:   Allergen Reactions    Gluten protein      Family History     Problem Relation (Age of Onset)    COPD Brother    Cancer Mother, Father    Glaucoma Sister    Heart disease Sister        Tobacco Use    Smoking status: Former Smoker     Packs/day: 2.00     Years: 35.00     Pack years: 70.00     Types: Cigarettes     Last attempt to quit: 1988     Years since quittin.9    Smokeless tobacco: Never Used   Substance and Sexual Activity    Alcohol use: No     Alcohol/week: 0.0 oz     Comment: twice a year on special occassions    Drug use: No    Sexual " activity: Not Currently     Partners: Male     Review of Systems   Constitutional: Positive for activity change and appetite change. Negative for chills and fever.   HENT: Negative for sore throat and trouble swallowing.    Respiratory: Negative for cough and shortness of breath.    Cardiovascular: Negative for chest pain and leg swelling.   Gastrointestinal: Negative for abdominal distention, abdominal pain, blood in stool, constipation, diarrhea, nausea and vomiting.   Genitourinary: Negative for decreased urine volume and difficulty urinating.   Musculoskeletal: Negative for arthralgias and back pain.   Skin: Positive for color change. Negative for pallor.   Neurological: Positive for weakness. Negative for dizziness and light-headedness.   Psychiatric/Behavioral: Negative for agitation and confusion.     Objective:     Vital Signs (Most Recent):  Temp: 97.8 °F (36.6 °C) (04/27/19 1126)  Pulse: 86 (04/27/19 1408)  Resp: 16 (04/27/19 1408)  BP: 106/62 (04/27/19 1126)  SpO2: 95 % (04/27/19 1126) Vital Signs (24h Range):  Temp:  [96.2 °F (35.7 °C)-97.9 °F (36.6 °C)] 97.8 °F (36.6 °C)  Pulse:  [] 86  Resp:  [16-23] 16  SpO2:  [92 %-96 %] 95 %  BP: ()/(52-73) 106/62     Weight: 48.5 kg (107 lb) (04/26/19 1235)  Body mass index is 18.95 kg/m².      Intake/Output Summary (Last 24 hours) at 4/27/2019 1518  Last data filed at 4/27/2019 1411  Gross per 24 hour   Intake 700 ml   Output --   Net 700 ml       Lines/Drains/Airways     Peripheral Intravenous Line                 Peripheral IV - Single Lumen 04/26/19 1750 22 G Left Forearm less than 1 day                Physical Exam   Constitutional: She is oriented to person, place, and time.   Well appearing, no apparent distress  Resting in bed comfortably   HENT:   Mouth/Throat: Oropharynx is clear and moist.   Eyes: Scleral icterus is present.   Cardiovascular: Normal rate and regular rhythm.   Pulmonary/Chest: Effort normal and breath sounds normal. No  respiratory distress.   Abdominal: Soft. Bowel sounds are normal. She exhibits no distension and no mass. There is no tenderness. There is no guarding.   Musculoskeletal: She exhibits no edema or deformity.   Neurological: She is alert and oriented to person, place, and time.   Skin: Skin is warm and dry.   jaundice   Psychiatric: She has a normal mood and affect.   Vitals reviewed.      Significant Labs:  CBC:   Recent Labs   Lab 04/26/19  1345 04/27/19  0524   WBC 19.02* 15.01*   HGB 13.4 12.0   HCT 41.4 37.1    186     CMP:   Recent Labs   Lab 04/27/19  0524   GLU 80   CALCIUM 8.4*   ALBUMIN 2.0*   PROT 5.5*      K 4.4   CO2 28   CL 99   BUN 15   CREATININE 0.7   ALKPHOS 128   ALT 44   AST 59*   BILITOT 6.5*     Coagulation: No results for input(s): PT, INR, APTT in the last 48 hours.        Assessment/Plan:     Biliary obstruction  86 yo F with hx of Alzheimer's dementia, COPD, celiac disease, and diastolic heart failure presented to the ED yesterday for change in color, decrease appetite, and fatigue over the past few weeks. Imaging here found to have a pancreatic head mass with biliary obstruction. TB now elevated to 6.5 and was completely normal six days ago. Low suspicion for cholangitis given no abdominal pain, fevers, or altered mental status. Hypotension and tachycardiaon arrival likely more related to dehydration given poor appetite for the past week. Additionally, though she does have a leukocytosis, this was present six days ago when her bilirubin was normal.     Recommendations:  - Ok to continue antibiotics empirically  - Continue fluid resuscitation as cardiac function allows  - Regular diet  - Plan for ERCP and EUS Monday          Thank you for your consult. I will follow-up with patient. Please contact us if you have any additional questions.    Ced Oquendo MD  Gastroenterology  [facility]

## 2019-04-27 NOTE — ASSESSMENT & PLAN NOTE
Boderline reduced EF 45%  Stable   No signes of fluid overload     Plan:  - PRN Lasix 20mg for weight gain >3-4 lbs  - check weight 3-4 times a week  - Hold off on GDMT per cards res  - No BB due to severe COPD

## 2019-04-27 NOTE — PLAN OF CARE
Problem: Fall Injury Risk  Goal: Absence of Fall and Fall-Related Injury    Intervention: Identify and Manage Contributors to Fall Injury Risk     04/27/19 1752   Manage Acute Allergic Reaction   Medication Review/Management medications reviewed   Identify and Manage Contributors to Fall Injury Risk   Self-Care Promotion independence encouraged;BADL personal objects within reach;BADL personal routines maintained;meal setup provided;safe use of adaptive equipment encouraged     Intervention: Promote Injury-Free Environment     04/27/19 1752   Optimize [place] and Functional Mobility   Environmental Safety Modification assistive device/personal items within reach;clutter free environment maintained;lighting adjusted;mobility aid in reach;room organization consistent   Optimize Balance and Safe Activity   Safety Promotion/Fall Prevention assistive device/personal item within reach;bed alarm set;commode/urinal/bedpan at bedside;Fall Risk reviewed with patient/family;family to remain at bedside;lighting adjusted;medications reviewed;nonskid shoes/socks when out of bed;side rails raised x 2;instructed to call staff for mobility

## 2019-04-27 NOTE — ED NOTES
Telemetry Verification   Patient placed on Telemetry Box  Verified with War Room  Box # [id number]   Monitor Tech dayron   Rate 67   Rhythm nS

## 2019-04-27 NOTE — ASSESSMENT & PLAN NOTE
Stable   No acute exacerbation, no wheezes  No acute changed on chest x-ray     Plan:  - continue home inhalers   - breathing treatment as needed

## 2019-04-27 NOTE — ASSESSMENT & PLAN NOTE
Per Pulm Outpatient notes patient is DNR, will need to clarify with daughter and Pal care was discussed previously.

## 2019-04-28 LAB
ALBUMIN SERPL BCP-MCNC: 2 G/DL (ref 3.5–5.2)
ALP SERPL-CCNC: 209 U/L (ref 55–135)
ALT SERPL W/O P-5'-P-CCNC: 49 U/L (ref 10–44)
ANION GAP SERPL CALC-SCNC: 12 MMOL/L (ref 8–16)
AST SERPL-CCNC: 75 U/L (ref 10–40)
BASOPHILS # BLD AUTO: 0.07 K/UL (ref 0–0.2)
BASOPHILS NFR BLD: 0.5 % (ref 0–1.9)
BILIRUB SERPL-MCNC: 7.6 MG/DL (ref 0.1–1)
BUN SERPL-MCNC: 14 MG/DL (ref 8–23)
CALCIUM SERPL-MCNC: 8.4 MG/DL (ref 8.7–10.5)
CHLORIDE SERPL-SCNC: 101 MMOL/L (ref 95–110)
CO2 SERPL-SCNC: 23 MMOL/L (ref 23–29)
CREAT SERPL-MCNC: 0.7 MG/DL (ref 0.5–1.4)
DIFFERENTIAL METHOD: ABNORMAL
EOSINOPHIL # BLD AUTO: 0.2 K/UL (ref 0–0.5)
EOSINOPHIL NFR BLD: 1.5 % (ref 0–8)
ERYTHROCYTE [DISTWIDTH] IN BLOOD BY AUTOMATED COUNT: 15 % (ref 11.5–14.5)
EST. GFR  (AFRICAN AMERICAN): >60 ML/MIN/1.73 M^2
EST. GFR  (NON AFRICAN AMERICAN): >60 ML/MIN/1.73 M^2
GLUCOSE SERPL-MCNC: 95 MG/DL (ref 70–110)
HCT VFR BLD AUTO: 39.8 % (ref 37–48.5)
HGB BLD-MCNC: 12.8 G/DL (ref 12–16)
IMM GRANULOCYTES # BLD AUTO: 0.12 K/UL (ref 0–0.04)
IMM GRANULOCYTES NFR BLD AUTO: 0.8 % (ref 0–0.5)
LYMPHOCYTES # BLD AUTO: 1.1 K/UL (ref 1–4.8)
LYMPHOCYTES NFR BLD: 7.3 % (ref 18–48)
MCH RBC QN AUTO: 28.3 PG (ref 27–31)
MCHC RBC AUTO-ENTMCNC: 32.2 G/DL (ref 32–36)
MCV RBC AUTO: 88 FL (ref 82–98)
MONOCYTES # BLD AUTO: 1.4 K/UL (ref 0.3–1)
MONOCYTES NFR BLD: 9.4 % (ref 4–15)
NEUTROPHILS # BLD AUTO: 12.2 K/UL (ref 1.8–7.7)
NEUTROPHILS NFR BLD: 80.5 % (ref 38–73)
NRBC BLD-RTO: 0 /100 WBC
PLATELET # BLD AUTO: 230 K/UL (ref 150–350)
PMV BLD AUTO: 10.7 FL (ref 9.2–12.9)
POTASSIUM SERPL-SCNC: 4.2 MMOL/L (ref 3.5–5.1)
PROT SERPL-MCNC: 5.9 G/DL (ref 6–8.4)
RBC # BLD AUTO: 4.52 M/UL (ref 4–5.4)
SODIUM SERPL-SCNC: 136 MMOL/L (ref 136–145)
WBC # BLD AUTO: 15.14 K/UL (ref 3.9–12.7)

## 2019-04-28 PROCEDURE — 25000242 PHARM REV CODE 250 ALT 637 W/ HCPCS: Mod: HCNC | Performed by: STUDENT IN AN ORGANIZED HEALTH CARE EDUCATION/TRAINING PROGRAM

## 2019-04-28 PROCEDURE — 11000001 HC ACUTE MED/SURG PRIVATE ROOM: Mod: HCNC

## 2019-04-28 PROCEDURE — 36415 COLL VENOUS BLD VENIPUNCTURE: CPT | Mod: HCNC

## 2019-04-28 PROCEDURE — 80053 COMPREHEN METABOLIC PANEL: CPT | Mod: HCNC

## 2019-04-28 PROCEDURE — 63600175 PHARM REV CODE 636 W HCPCS: Mod: HCNC | Performed by: STUDENT IN AN ORGANIZED HEALTH CARE EDUCATION/TRAINING PROGRAM

## 2019-04-28 PROCEDURE — 97165 OT EVAL LOW COMPLEX 30 MIN: CPT | Mod: HCNC

## 2019-04-28 PROCEDURE — 99233 PR SUBSEQUENT HOSPITAL CARE,LEVL III: ICD-10-PCS | Mod: HCNC,GC,, | Performed by: INTERNAL MEDICINE

## 2019-04-28 PROCEDURE — 85025 COMPLETE CBC W/AUTO DIFF WBC: CPT | Mod: HCNC

## 2019-04-28 PROCEDURE — 97161 PT EVAL LOW COMPLEX 20 MIN: CPT | Mod: HCNC

## 2019-04-28 PROCEDURE — 99233 SBSQ HOSP IP/OBS HIGH 50: CPT | Mod: HCNC,GC,, | Performed by: INTERNAL MEDICINE

## 2019-04-28 PROCEDURE — 25000003 PHARM REV CODE 250: Mod: HCNC | Performed by: STUDENT IN AN ORGANIZED HEALTH CARE EDUCATION/TRAINING PROGRAM

## 2019-04-28 RX ADMIN — PIPERACILLIN AND TAZOBACTAM 4.5 G: 4; .5 INJECTION, POWDER, LYOPHILIZED, FOR SOLUTION INTRAVENOUS; PARENTERAL at 08:04

## 2019-04-28 RX ADMIN — TIOTROPIUM BROMIDE 18 MCG: 18 CAPSULE ORAL; RESPIRATORY (INHALATION) at 09:04

## 2019-04-28 RX ADMIN — PIPERACILLIN AND TAZOBACTAM 4.5 G: 4; .5 INJECTION, POWDER, LYOPHILIZED, FOR SOLUTION INTRAVENOUS; PARENTERAL at 02:04

## 2019-04-28 RX ADMIN — PIPERACILLIN AND TAZOBACTAM 4.5 G: 4; .5 INJECTION, POWDER, LYOPHILIZED, FOR SOLUTION INTRAVENOUS; PARENTERAL at 11:04

## 2019-04-28 RX ADMIN — FLUTICASONE FUROATE AND VILANTEROL TRIFENATATE 1 PUFF: 100; 25 POWDER RESPIRATORY (INHALATION) at 09:04

## 2019-04-28 NOTE — PLAN OF CARE
Problem: Occupational Therapy Goal  Goal: Occupational Therapy Goal  Goals to be met by: 5/20    Patient will increase functional independence with ADLs by performing:    UE Dressing with Contact Guard Assistance.  LE Dressing with Contact Guard Assistance.  Grooming while seated with Set-up Assistance.  Toileting from toilet with Contact Guard Assistance for hygiene and clothing management.   Bathing from  shower chair/bench with Minimal Assistance.    Outcome: Ongoing (interventions implemented as appropriate)  Evaluation complete and goals set.  Cont with POC  Kimberlee Frausto, OT  4/28/2019

## 2019-04-28 NOTE — PT/OT/SLP EVAL
Physical Therapy Evaluation    Patient Name:  Zeynep Hamm   MRN:  [id number]    Recommendations:     Discharge Recommendations:  nursing facility, skilled   Discharge Equipment Recommendations: walker, rolling   Barriers to discharge: None    Assessment:     Zeynep Hamm is a 87 y.o. female admitted with a medical diagnosis of Cholangitis.  She presents with the following impairments/functional limitations:  weakness, impaired endurance, impaired self care skills, gait instability, impaired functional mobilty, pain, impaired joint extensibility, impaired balance, impaired cognition.    -PT eval complete. Pt tolerated session well today with no complications. Pt with no LOB during ambulation and t/f's using HHA x1 for support. Pt reported no inc in pain with upright activities. Pt will cont to benefit from therapy services and is appropriate for SNF placement at this time as she has limited assistance at home.    Rehab Prognosis: Good; patient would benefit from acute skilled PT services to address these deficits and reach maximum level of function.    Recent Surgery: Procedure(s) (LRB):  ERCP (ENDOSCOPIC RETROGRADE CHOLANGIOPANCREATOGRAPHY) (N/A)  ULTRASOUND, UPPER GI TRACT, ENDOSCOPIC (N/A)      Plan:     During this hospitalization, patient to be seen 3 x/week to address the identified rehab impairments via gait training, therapeutic activities, therapeutic exercises and progress toward the following goals:    · Plan of Care Expires:  05/28/19    Subjective     Chief Complaint: impaired mobility  Patient/Family Comments/goals: return home to OF  Pain/Comfort:  · Pain Rating 1: 0/10    Patients cultural, spiritual, [employer]ism conflicts given the current situation:      Living Environment:  -Pt lives at home with her son who is unavailable for most of the day. Pt also has children who live nearby who can (A). Pt required (A) for ADL's PTA.  Prior to admission, patients level of function was mod (I).   Equipment used at home: wheelchair.  DME owned (not currently used): .  Upon discharge, patient will have assistance from undetermined.    Objective:     Communicated with nsg prior to session.  Patient found up in chair with (lines intact)  upon PT entry to room.    General Precautions: Standard, fall   Orthopedic Precautions:N/A   Braces: N/A     Exams:  · Sensation:    · -       Intact  · RLE ROM: WFL  · RLE Strength: WFL  · LLE ROM: WFL  · LLE Strength: WFL    Functional Mobility:  · Transfers:     · Sit to Stand:  contact guard assistance with rolling walker  · Bed to Chair: contact guard assistance with  rolling walker  using  Stand Pivot  · Gait: 80' x1 trial using HHAx1 and min (A). Pt took 1-2 standing rest breaks and was given cueing for direction change and CGA for stability      Therapeutic Activities and Exercises:   -Pt educated on:  A. PT POC and role of PT  B. Importance of OOB activity to improve functional outcomes   C. DME mgmt and t/f sequencing  D. Performing HEP to reduce the risk of developing blood clots  E. Gait sequencing   F. D/c planning      AM-PAC 6 CLICK MOBILITY  Total Score:20     Patient left up in chair with all lines intact, call button in reach and nsg notified.    GOALS:   Multidisciplinary Problems     Physical Therapy Goals        Problem: Physical Therapy Goal    Goal Priority Disciplines Outcome Goal Variances Interventions   Physical Therapy Goal     PT, PT/OT Ongoing (interventions implemented as appropriate)     Description:  Goals to be met by: 19     Patient will increase functional independence with mobility by performin. Sit to stand transfer with Supervision  2. Bed to chair transfer with Supervision using appropriate AD  3. Gait  x 200 feet with Stand-by Assistance using appropriate AD.   4. Ascend/Descend 6 inch curb step with Contact Guard Assistance using appropriate AD.                      History:     Past Medical History:   Diagnosis Date     Alzheimer disease     Bronchiectasis     Cataract     both eyes    Celiac disease     Emphysema of lung     Encounter for blood transfusion     Lung disease     Short-term memory loss        Past Surgical History:   Procedure Laterality Date    APPENDECTOMY      CATARACT EXTRACTION W/  INTRAOCULAR LENS IMPLANT Right 4/14/14    CATARACT EXTRACTION W/  INTRAOCULAR LENS IMPLANT Left 5/12/14    EXCISION-MASS Left 10/31/2013    Performed by [facility] 2ND [facility]LR    EXCISION-SOFT TISSUE - right shin calcium deposits Right 8/19/2015    Performed by SAL Morgan III, [facility] at [facility]ND [facility]LR    INSERTION, IOL PROSTHESIS Left 5/12/2014    Performed by [facility] at [facility]    INSERTION, IOL PROSTHESIS Right 4/14/2014    Performed by [facility] at [facility]    PHACOEMULSIFICATION, CATARACT Left 5/12/2014    Performed by [facility] at [facility]    PHACOEMULSIFICATION, CATARACT Right 4/14/2014    Performed by [facility] at [facility]    TONSILLECTOMY      uterine suspension         Time Tracking:     PT Received On: 04/28/19  PT Start Time: 1405     PT Stop Time: 1423  PT Total Time (min): 18 min     Billable Minutes: Evaluation 18      Nasim Sharpe, PT  04/28/2019

## 2019-04-28 NOTE — PROGRESS NOTES
[facility] Hospital Medicine  Progress Note    Patient Name: Zeynep Hamm  MRN: [id number]  Patient Class: IP- Inpatient   Admission Date: 4/26/2019  Length of Stay: 2 days  Attending Physician: Michelle Nunez MD  Primary Care Provider: Maya Kelley MD    [facility] Medicine Team: Networked reference to record PCT  Nasim Paige MD    Subjective:     Principal Problem:Cholangitis    HPI:  87 years old female with past medical history of Alzheimer, COPD, Cataract, Celiac disease, Diastolic HF (EF 45%) presented to ED with fatigue.  Per patient daughter, she has been increasingly getting weaker over the last 3 weeks to a point where she is unable to stand up today and that's when the daughter decided to bring the patient to ED, at baseline patient able to walk short distance, she also has been sleeping more, eat less than usual with gradual wieght loss over coupleof weeks, noticed to have yellowish skin discoloration today, otherwise has no fever, chills, chest pain, shortness of breath, abdominal pain, nausea, vomiting, diarrhea and urinary symptoms. Patient quit smoking 35 years ago, doesn't drink alcohol, no recent surgeries or travel, has family history of leukemia and mediastinal mass.She was admitted to the hospital recently on 4/18/2019 with COPD exacerbation and new onset systolic congestive heart failure, started on lasix.  At ED patient patient was hypotensive and tachycardic, labs significant for elevated WBC, LFTs and bilirubin, U/S abdomin showed head of pancrease mass with extra and intrahepatic biliary ductal dilatation and hepatic lesions.        Hospital Course:  Admitted for suspect cholangitis, now improved on Abx awaiting EUS on Monday for pancreatic mass.     Interval History: Doing well overnight. Sister at bedside. Dicussed plans for EUS tomorrow.  NPO tonight.  Brief goals of care with daughter POA      Review of Systems   Constitutional: Negative for chills and  fever.   Respiratory: Negative for cough.    Cardiovascular: Negative for chest pain and palpitations.   Gastrointestinal: Negative for abdominal pain.   Genitourinary: Negative for dysuria.     Objective:     Vital Signs (Most Recent):  Temp: 97.9 °F (36.6 °C) (04/28/19 1608)  Pulse: (!) 121 (04/28/19 1608)  Resp: 18 (04/28/19 1608)  BP: 112/71 (04/28/19 1608)  SpO2: (!) 93 % (04/28/19 1608) Vital Signs (24h Range):  Temp:  [97.5 °F (36.4 °C)-98 °F (36.7 °C)] 97.9 °F (36.6 °C)  Pulse:  [] 121  Resp:  [18] 18  SpO2:  [93 %-98 %] 93 %  BP: ()/(55-71) 112/71     Weight: 48.5 kg (107 lb)  Body mass index is 18.95 kg/m².    Intake/Output Summary (Last 24 hours) at 4/28/2019 1734  Last data filed at 4/28/2019 1246  Gross per 24 hour   Intake 210 ml   Output 250 ml   Net -40 ml      Physical Exam   Constitutional: She appears well-developed. No distress.   thin   HENT:   Head: Normocephalic and atraumatic.   Eyes: Pupils are equal, round, and reactive to light.   Neck: Neck supple.   Cardiovascular: Normal rate, regular rhythm and normal heart sounds.   Pulmonary/Chest: Effort normal and breath sounds normal. No respiratory distress.   Abdominal: Soft. Bowel sounds are normal. She exhibits no distension. There is tenderness (Very mild, RUQ).   Musculoskeletal: She exhibits no edema or tenderness.   Lymphadenopathy:     She has no cervical adenopathy.   Neurological: She is alert.   disoriented to time and situation, but alert to place.   Pleasant    Skin: She is not diaphoretic. No pallor.   slightly jaundiced   Psychiatric: She has a normal mood and affect.   Vitals reviewed.      Significant Labs: All pertinent labs within the past 24 hours have been reviewed.    Significant Imaging: I have reviewed all pertinent imaging results/findings within the past 24 hours.    Assessment/Plan:      * Cholangitis  AES aware and on board. Improved with IV Zosyn   - Plan for possible EUS Monday, will scope sooner if  clinically deteriorates               Neoplasm of head of pancreas  Newly diagnosed, with what is likely mets to the liver  - Plan to discuss with daughter who per patient is POA   - Consider Heme/onc consult once path is obtained.        Goals of care, counseling/discussion  Made DNR, Brief goals discuss.  Expect discharge to hospice         COPD (chronic obstructive pulmonary disease)  Stable Patient on Combo ICS LABA and LABA LAMA outpatient   - continue ICS, LABA, Added LAMA (spiriva),   - breathing treatment as needed        Congestive heart failure (CHF)  Borderline EF with signs of diastolic dysfunction  - Holding BB given severe COPD  - BP control to help reduce risk of flash Pulm edema.      Celiac disease  Stable no active issue     Plan:   - gluten free diet         Dementia in Alzheimer's disease  Patient mental status at baseline    Plan:  - Delirium precautions  - PT/OT             VTE Risk Mitigation (From admission, onward)        Ordered     enoxaparin injection 40 mg  Daily      04/26/19 1926     IP VTE HIGH RISK PATIENT  Once      04/26/19 1926     Place CAT hose  Until discontinued      04/26/19 1845     Place sequential compression device  Until discontinued      04/26/19 1845          Dispo: EUS tomorrow, likely discharged to hospice pending path results    Nasim Paige MD  Department of Hospital Medicine   [facility]

## 2019-04-28 NOTE — SUBJECTIVE & OBJECTIVE
Interval History: Doing well overnight. Sister at bedside. Dicussed plans for EUS tomorrow.  NPO tonight.  Brief goals of care with daughter POA      Review of Systems   Constitutional: Negative for chills and fever.   Respiratory: Negative for cough.    Cardiovascular: Negative for chest pain and palpitations.   Gastrointestinal: Negative for abdominal pain.   Genitourinary: Negative for dysuria.     Objective:     Vital Signs (Most Recent):  Temp: 97.9 °F (36.6 °C) (04/28/19 1608)  Pulse: (!) 121 (04/28/19 1608)  Resp: 18 (04/28/19 1608)  BP: 112/71 (04/28/19 1608)  SpO2: (!) 93 % (04/28/19 1608) Vital Signs (24h Range):  Temp:  [97.5 °F (36.4 °C)-98 °F (36.7 °C)] 97.9 °F (36.6 °C)  Pulse:  [] 121  Resp:  [18] 18  SpO2:  [93 %-98 %] 93 %  BP: ()/(55-71) 112/71     Weight: 48.5 kg (107 lb)  Body mass index is 18.95 kg/m².    Intake/Output Summary (Last 24 hours) at 4/28/2019 1734  Last data filed at 4/28/2019 1246  Gross per 24 hour   Intake 210 ml   Output 250 ml   Net -40 ml      Physical Exam   Constitutional: She appears well-developed. No distress.   thin   HENT:   Head: Normocephalic and atraumatic.   Eyes: Pupils are equal, round, and reactive to light.   Neck: Neck supple.   Cardiovascular: Normal rate, regular rhythm and normal heart sounds.   Pulmonary/Chest: Effort normal and breath sounds normal. No respiratory distress.   Abdominal: Soft. Bowel sounds are normal. She exhibits no distension. There is tenderness (Very mild, RUQ).   Musculoskeletal: She exhibits no edema or tenderness.   Lymphadenopathy:     She has no cervical adenopathy.   Neurological: She is alert.   disoriented to time and situation, but alert to place.   Pleasant    Skin: She is not diaphoretic. No pallor.   slightly jaundiced   Psychiatric: She has a normal mood and affect.   Vitals reviewed.      Significant Labs: All pertinent labs within the past 24 hours have been reviewed.    Significant Imaging: I have reviewed  all pertinent imaging results/findings within the past 24 hours.

## 2019-04-28 NOTE — PHARMACY MED REC
"Admission Medication Reconciliation - Pharmacy Consult Note    The home medication history was taken by Olga Bhatia, Pharmacy Technician.  Based on information gathered and subsequent review by the clinical pharmacist, the items below may need attention.    You may go to "Admission" then "Reconcile Home Medications" tabs to review and/or act upon these items.      No issues noted with the medication reconciliation.      Please address this information as you see fit.  Feel free to contact us if you have any questions or require assistance.    Thank you,   Ever Martini, PharmD  Emergency Medicine Clinical Pharmacist  [phone] (2pm-midnight daily)    .    .          "

## 2019-04-28 NOTE — PT/OT/SLP EVAL
Occupational Therapy   Evaluation    Name: Zeynep Hamm  MRN: [id number]  Admitting Diagnosis:  Cholangitis      Recommendations:     Discharge Recommendations: nursing facility, skilled  Discharge Equipment Recommendations:     Barriers to discharge:  None    Assessment:     Zeynep Hamm is a 87 y.o. female with a medical diagnosis of Cholangitis.  She presents supine and with noted Alzheimer's.  Pt presented with daughter in room who states pt with paid aide for self care and daily visits from adult children with assist with daily tasks and food management.  Daughter is aware pt will require higher level of care and is to look for assisted living facilities.  Pt will benefit from SNF for max gains and return to PLOF .  Pt with potential Performance deficits affecting function: weakness, impaired endurance, impaired self care skills, impaired functional mobilty, impaired balance.      Rehab Prognosis: Good; patient would benefit from acute skilled OT services to address these deficits and reach maximum level of function.       Plan:     Patient to be seen 3 x/week to address the above listed problems via self-care/home management, therapeutic activities, therapeutic exercises  · Plan of Care Expires: 05/28/19  · Plan of Care Reviewed with: patient, daughter    Subjective     Chief Complaint: poor functional performance   Patient/Family Comments/goals: return to PLOF     Occupational Profile:  Living Environment: Pt lives in her home with non-involved son and 2 adult children nearby to assist   Previous level of function: pt with overall min A for self care completion   Equipment Used at Home:  wheelchair  Assistance upon Discharge: family able to assist     Pain/Comfort:  ·      Patients cultural, spiritual, Congregational conflicts given the current situation: no    Objective:     Communicated with: RN prior to session.  Patient found supine with (lines intact ) upon OT entry to room.    General Precautions:  Standard, fall   Orthopedic Precautions:N/A   Braces: N/A     Occupational Performance:    Bed Mobility:    · Patient completed Rolling/Turning to Right with minimum assistance  · Patient completed Scooting/Bridging with minimum assistance  · Patient completed Supine to Sit with minimum assistance    Functional Mobility/Transfers:  · Patient completed Sit <> Stand Transfer with minimum assistance  with  hand-held assist   · Patient completed Bed <> Chair Transfer using Stand Pivot technique with minimum assistance with hand-held assist    Activities of Daily Living:  · Grooming: minimum assistance    · Bathing: moderate assistance    · Upper Body Dressing: minimum assistance    · Lower Body Dressing: moderate assistance    · Toileting: minimum assistance      Cognitive/Visual Perceptual:  Cognitive/Psychosocial Skills:     -       Oriented to: Person, Place, Time and Situation   -       Follows Commands/attention:Follows two-step commands  -       Communication: clear/fluent  -       Memory: Impaired STM, Impaired LTM and Poor immediate recall  -       Safety awareness/insight to disability: impaired   -       Mood/Affect/Coping skills/emotional control: Appropriate to situation    Physical Exam:  Upper Extremity Range of Motion:     -       Right Upper Extremity: WFL  -       Left Upper Extremity: WFL  Upper Extremity Strength:    -       Right Upper Extremity: WFL  -       Left Upper Extremity: WFL    AMPAC 6 Click ADL:  AMPAC Total Score: 18    Treatment & Education:  Evaluation complete and goals set.  Pt educated on safety, role of OT, importance of increased participation in self care for gains , expectations for participation, expectations for gains, POC, energy conservation, caregiver strain. White board updated.     Education:    Patient left up in chair with all lines intact    GOALS:   Multidisciplinary Problems     Occupational Therapy Goals        Problem: Occupational Therapy Goal    Goal Priority  Disciplines Outcome Interventions   Occupational Therapy Goal     OT, PT/OT Ongoing (interventions implemented as appropriate)    Description:  Goals to be met by: 5/20    Patient will increase functional independence with ADLs by performing:    UE Dressing with Contact Guard Assistance.  LE Dressing with Contact Guard Assistance.  Grooming while seated with Set-up Assistance.  Toileting from toilet with Contact Guard Assistance for hygiene and clothing management.   Bathing from  shower chair/bench with Minimal Assistance.                      History:     Past Medical History:   Diagnosis Date    Alzheimer disease     Bronchiectasis     Cataract     both eyes    Celiac disease     Emphysema of lung     Encounter for blood transfusion     Lung disease     Short-term memory loss        Past Surgical History:   Procedure Laterality Date    APPENDECTOMY      CATARACT EXTRACTION W/  INTRAOCULAR LENS IMPLANT Right 4/14/14    CATARACT EXTRACTION W/  INTRAOCULAR LENS IMPLANT Left 5/12/14    EXCISION-MASS Left 10/31/2013    Performed by SAL Morgan III, [facility] OR 2ND FLR    EXCISION-SOFT TISSUE - right shin calcium deposits Right 8/19/2015    Performed by SAL Morgan III, MD at [facility] OR 2ND FLR    INSERTION, IOL PROSTHESIS Left 5/12/2014    Performed by [facility] at [facility] OR    INSERTION, IOL PROSTHESIS Right 4/14/2014    [facility]formed by [facility] at [facility] OR    PHACOEMULSIFICATION, CATARACT Left 5/12/2014    [facility]formed by [facility] at [facility] OR    PHACOEMULSIFICATION, CATARACT Right 4/14/2014    Performed by [facility] Flores [facility] at [facility] OR    TONSILLECTOMY      uterine suspension         Time Tracking:     OT Date of Treatment: 04/28/19  OT Start Time: 1005  OT Stop Time: 1025  OT Total Time (min): 20 min    Billable Minutes:Evaluation 20    Kimberlee Frausto, OT  4/28/2019

## 2019-04-28 NOTE — PLAN OF CARE
Problem: Adult Inpatient Plan of Care  Goal: Plan of Care Review  Outcome: Ongoing (interventions implemented as appropriate)  AOx1. Lying down in bed resting quietly. Denies pain or discomfort. Able to make some needs known. Ambulates with asst. Cardiac monitor discontinued. NADN at this time.

## 2019-04-28 NOTE — PLAN OF CARE
Problem: Physical Therapy Goal  Goal: Physical Therapy Goal  Goals to be met by: 19     Patient will increase functional independence with mobility by performin. Sit to stand transfer with Supervision  2. Bed to chair transfer with Supervision using appropriate AD  3. Gait  x 200 feet with Stand-by Assistance using appropriate AD.   4. Ascend/Descend 6 inch curb step with Contact Guard Assistance using appropriate AD.    Outcome: Ongoing (interventions implemented as appropriate)  PT eval complete. Pt tolerated session well today with no complications. Pt with no LOB during ambulation and t/f's using HHA x1 for support. Pt reported no inc in pain with upright activities. Pt will cont to benefit from therapy services and is appropriate for SNF placement at this time as she has limited assistance at home.

## 2019-04-28 NOTE — NURSING
Primary medical team was paged, and stated they will be up shortly to round on patient.  Pt's daughter is currently at bedside and would like to discuss possible hospice and also to get any updates on her mother's blood work.

## 2019-04-28 NOTE — NURSING
Daughter is currently at bedside, she had a few concerns, stated she wants to know the update on her mother's blood work/ kidney function.  She stated that she feels her mother is more jaundiced today than yesterday.  Also wanted to discuss possible hospice care.  Requested to let primary medical team know.

## 2019-04-29 ENCOUNTER — ANESTHESIA EVENT (OUTPATIENT)
Dept: ENDOSCOPY | Facility: HOSPITAL | Age: 84
DRG: 420 | End: 2019-04-29
Payer: MEDICARE

## 2019-04-29 ENCOUNTER — ANESTHESIA (OUTPATIENT)
Dept: ENDOSCOPY | Facility: HOSPITAL | Age: 84
DRG: 420 | End: 2019-04-29
Payer: MEDICARE

## 2019-04-29 PROBLEM — C78.7 METASTASIS TO LIVER: Status: ACTIVE | Noted: 2019-04-29

## 2019-04-29 PROBLEM — R41.82 ALTERED MENTAL STATUS, UNSPECIFIED: Status: ACTIVE | Noted: 2019-04-29

## 2019-04-29 PROBLEM — K86.89 PANCREATIC MASS: Status: ACTIVE | Noted: 2019-04-29

## 2019-04-29 PROBLEM — K90.0 CELIAC DISEASE: Chronic | Status: ACTIVE | Noted: 2019-04-18

## 2019-04-29 LAB
ALBUMIN SERPL BCP-MCNC: 1.9 G/DL (ref 3.5–5.2)
ALP SERPL-CCNC: 228 U/L (ref 55–135)
ALT SERPL W/O P-5'-P-CCNC: 51 U/L (ref 10–44)
ANION GAP SERPL CALC-SCNC: 12 MMOL/L (ref 8–16)
AST SERPL-CCNC: 79 U/L (ref 10–40)
BASOPHILS # BLD AUTO: 0.04 K/UL (ref 0–0.2)
BASOPHILS NFR BLD: 0.2 % (ref 0–1.9)
BILIRUB SERPL-MCNC: 8.3 MG/DL (ref 0.1–1)
BUN SERPL-MCNC: 12 MG/DL (ref 8–23)
CALCIUM SERPL-MCNC: 8.2 MG/DL (ref 8.7–10.5)
CHLORIDE SERPL-SCNC: 103 MMOL/L (ref 95–110)
CO2 SERPL-SCNC: 22 MMOL/L (ref 23–29)
CREAT SERPL-MCNC: 0.7 MG/DL (ref 0.5–1.4)
DIFFERENTIAL METHOD: ABNORMAL
EOSINOPHIL # BLD AUTO: 0.1 K/UL (ref 0–0.5)
EOSINOPHIL NFR BLD: 0.8 % (ref 0–8)
ERYTHROCYTE [DISTWIDTH] IN BLOOD BY AUTOMATED COUNT: 15.1 % (ref 11.5–14.5)
EST. GFR  (AFRICAN AMERICAN): >60 ML/MIN/1.73 M^2
EST. GFR  (NON AFRICAN AMERICAN): >60 ML/MIN/1.73 M^2
GLUCOSE SERPL-MCNC: 105 MG/DL (ref 70–110)
HCT VFR BLD AUTO: 34.6 % (ref 37–48.5)
HGB BLD-MCNC: 11.3 G/DL (ref 12–16)
IMM GRANULOCYTES # BLD AUTO: 0.15 K/UL (ref 0–0.04)
IMM GRANULOCYTES NFR BLD AUTO: 0.9 % (ref 0–0.5)
LYMPHOCYTES # BLD AUTO: 0.9 K/UL (ref 1–4.8)
LYMPHOCYTES NFR BLD: 5.6 % (ref 18–48)
MCH RBC QN AUTO: 28 PG (ref 27–31)
MCHC RBC AUTO-ENTMCNC: 32.7 G/DL (ref 32–36)
MCV RBC AUTO: 86 FL (ref 82–98)
MONOCYTES # BLD AUTO: 1.4 K/UL (ref 0.3–1)
MONOCYTES NFR BLD: 8.7 % (ref 4–15)
NEUTROPHILS # BLD AUTO: 13.8 K/UL (ref 1.8–7.7)
NEUTROPHILS NFR BLD: 83.8 % (ref 38–73)
NRBC BLD-RTO: 0 /100 WBC
PLATELET # BLD AUTO: 233 K/UL (ref 150–350)
PMV BLD AUTO: 11.1 FL (ref 9.2–12.9)
POTASSIUM SERPL-SCNC: 3.9 MMOL/L (ref 3.5–5.1)
PROT SERPL-MCNC: 5.6 G/DL (ref 6–8.4)
RBC # BLD AUTO: 4.03 M/UL (ref 4–5.4)
SODIUM SERPL-SCNC: 137 MMOL/L (ref 136–145)
WBC # BLD AUTO: 16.49 K/UL (ref 3.9–12.7)

## 2019-04-29 PROCEDURE — 88172 CYTP DX EVAL FNA 1ST EA SITE: CPT | Mod: 26,HCNC,, | Performed by: PATHOLOGY

## 2019-04-29 PROCEDURE — 99223 1ST HOSP IP/OBS HIGH 75: CPT | Mod: HCNC,,, | Performed by: INTERNAL MEDICINE

## 2019-04-29 PROCEDURE — D9220A PRA ANESTHESIA: ICD-10-PCS | Mod: HCNC,ANES,, | Performed by: ANESTHESIOLOGY

## 2019-04-29 PROCEDURE — 99233 PR SUBSEQUENT HOSPITAL CARE,LEVL III: ICD-10-PCS | Mod: HCNC,GC,, | Performed by: INTERNAL MEDICINE

## 2019-04-29 PROCEDURE — 11000001 HC ACUTE MED/SURG PRIVATE ROOM: Mod: HCNC

## 2019-04-29 PROCEDURE — 63600175 PHARM REV CODE 636 W HCPCS: Mod: HCNC | Performed by: NURSE ANESTHETIST, CERTIFIED REGISTERED

## 2019-04-29 PROCEDURE — 25000003 PHARM REV CODE 250: Mod: HCNC | Performed by: NURSE ANESTHETIST, CERTIFIED REGISTERED

## 2019-04-29 PROCEDURE — 43242 EGD US FINE NEEDLE BX/ASPIR: CPT | Mod: 51,HCNC,, | Performed by: INTERNAL MEDICINE

## 2019-04-29 PROCEDURE — 25500020 PHARM REV CODE 255: Mod: HCNC | Performed by: INTERNAL MEDICINE

## 2019-04-29 PROCEDURE — 43242 PR UPGI ENDOSCOPY,FN NEEDLE BX,GUIDED: ICD-10-PCS | Mod: 51,HCNC,, | Performed by: INTERNAL MEDICINE

## 2019-04-29 PROCEDURE — 88172 CYTP DX EVAL FNA 1ST EA SITE: CPT | Mod: HCNC | Performed by: PATHOLOGY

## 2019-04-29 PROCEDURE — 74328 PR  X-RAY FOR BILE DUCT ENDOSCOPY: ICD-10-PCS | Mod: 26,HCNC,, | Performed by: INTERNAL MEDICINE

## 2019-04-29 PROCEDURE — 43274 PR ERCP W/STENT PLCMNT BILIARY/PANCREATIC DUCT: ICD-10-PCS | Mod: HCNC,,, | Performed by: INTERNAL MEDICINE

## 2019-04-29 PROCEDURE — 88305 TISSUE EXAM BY PATHOLOGIST: CPT | Mod: HCNC | Performed by: PATHOLOGY

## 2019-04-29 PROCEDURE — 88341 IMHCHEM/IMCYTCHM EA ADD ANTB: CPT | Mod: 26,HCNC,, | Performed by: PATHOLOGY

## 2019-04-29 PROCEDURE — 63600175 PHARM REV CODE 636 W HCPCS: Mod: HCNC | Performed by: STUDENT IN AN ORGANIZED HEALTH CARE EDUCATION/TRAINING PROGRAM

## 2019-04-29 PROCEDURE — C1876 STENT, NON-COA/NON-COV W/DEL: HCPCS | Mod: HCNC | Performed by: INTERNAL MEDICINE

## 2019-04-29 PROCEDURE — 99233 SBSQ HOSP IP/OBS HIGH 50: CPT | Mod: HCNC,GC,, | Performed by: INTERNAL MEDICINE

## 2019-04-29 PROCEDURE — 88341 CYTOLOGY SPECIMEN- FNA RADIOLOGY GUIDED, BRONCH/EBUS, EUS/GI: ICD-10-PCS | Mod: 26,HCNC,, | Performed by: PATHOLOGY

## 2019-04-29 PROCEDURE — 25000003 PHARM REV CODE 250: Mod: HCNC | Performed by: STUDENT IN AN ORGANIZED HEALTH CARE EDUCATION/TRAINING PROGRAM

## 2019-04-29 PROCEDURE — 88173 CYTOPATH EVAL FNA REPORT: CPT | Mod: 26,HCNC,, | Performed by: PATHOLOGY

## 2019-04-29 PROCEDURE — 25000242 PHARM REV CODE 250 ALT 637 W/ HCPCS: Mod: HCNC | Performed by: STUDENT IN AN ORGANIZED HEALTH CARE EDUCATION/TRAINING PROGRAM

## 2019-04-29 PROCEDURE — 80053 COMPREHEN METABOLIC PANEL: CPT | Mod: HCNC

## 2019-04-29 PROCEDURE — 36415 COLL VENOUS BLD VENIPUNCTURE: CPT | Mod: HCNC

## 2019-04-29 PROCEDURE — 43274 ERCP DUCT STENT PLACEMENT: CPT | Mod: HCNC,,, | Performed by: INTERNAL MEDICINE

## 2019-04-29 PROCEDURE — 88305 TISSUE EXAM BY PATHOLOGIST: CPT | Mod: 26,HCNC,, | Performed by: PATHOLOGY

## 2019-04-29 PROCEDURE — 37000009 HC ANESTHESIA EA ADD 15 MINS: Mod: HCNC | Performed by: INTERNAL MEDICINE

## 2019-04-29 PROCEDURE — 37000008 HC ANESTHESIA 1ST 15 MINUTES: Mod: HCNC | Performed by: INTERNAL MEDICINE

## 2019-04-29 PROCEDURE — 27202304 HC CANNULA, ERCP: Mod: HCNC | Performed by: INTERNAL MEDICINE

## 2019-04-29 PROCEDURE — 88342 IMHCHEM/IMCYTCHM 1ST ANTB: CPT | Mod: 26,HCNC,, | Performed by: PATHOLOGY

## 2019-04-29 PROCEDURE — 27202059 HC NEEDLE, FNA (ANY): Mod: HCNC | Performed by: INTERNAL MEDICINE

## 2019-04-29 PROCEDURE — D9220A PRA ANESTHESIA: Mod: HCNC,ANES,, | Performed by: ANESTHESIOLOGY

## 2019-04-29 PROCEDURE — 88342 CYTOLOGY SPECIMEN- FNA RADIOLOGY GUIDED, BRONCH/EBUS, EUS/GI: ICD-10-PCS | Mod: 26,HCNC,, | Performed by: PATHOLOGY

## 2019-04-29 PROCEDURE — 88305 CYTOLOGY SPECIMEN- FNA RADIOLOGY GUIDED, BRONCH/EBUS, EUS/GI: ICD-10-PCS | Mod: 26,HCNC,, | Performed by: PATHOLOGY

## 2019-04-29 PROCEDURE — 74328 X-RAY BILE DUCT ENDOSCOPY: CPT | Mod: 26,HCNC,, | Performed by: INTERNAL MEDICINE

## 2019-04-29 PROCEDURE — 74328 X-RAY BILE DUCT ENDOSCOPY: CPT | Mod: HCNC | Performed by: INTERNAL MEDICINE

## 2019-04-29 PROCEDURE — C1769 GUIDE WIRE: HCPCS | Mod: HCNC | Performed by: INTERNAL MEDICINE

## 2019-04-29 PROCEDURE — 99223 PR INITIAL HOSPITAL CARE,LEVL III: ICD-10-PCS | Mod: HCNC,,, | Performed by: INTERNAL MEDICINE

## 2019-04-29 PROCEDURE — 85025 COMPLETE CBC W/AUTO DIFF WBC: CPT | Mod: HCNC

## 2019-04-29 PROCEDURE — 43274 ERCP DUCT STENT PLACEMENT: CPT | Mod: HCNC | Performed by: INTERNAL MEDICINE

## 2019-04-29 PROCEDURE — 88172 CYTOLOGY SPECIMEN- FNA RADIOLOGY GUIDED, BRONCH/EBUS, EUS/GI: ICD-10-PCS | Mod: 26,HCNC,, | Performed by: PATHOLOGY

## 2019-04-29 PROCEDURE — 43242 EGD US FINE NEEDLE BX/ASPIR: CPT | Mod: HCNC | Performed by: INTERNAL MEDICINE

## 2019-04-29 PROCEDURE — 88173 CYTOLOGY SPECIMEN- FNA RADIOLOGY GUIDED, BRONCH/EBUS, EUS/GI: ICD-10-PCS | Mod: 26,HCNC,, | Performed by: PATHOLOGY

## 2019-04-29 RX ORDER — LIDOCAINE HCL/PF 100 MG/5ML
SYRINGE (ML) INTRAVENOUS
Status: DISCONTINUED | OUTPATIENT
Start: 2019-04-29 | End: 2019-04-29

## 2019-04-29 RX ORDER — ESMOLOL HYDROCHLORIDE 10 MG/ML
INJECTION INTRAVENOUS
Status: DISCONTINUED | OUTPATIENT
Start: 2019-04-29 | End: 2019-04-29

## 2019-04-29 RX ORDER — PHENYLEPHRINE HYDROCHLORIDE 10 MG/ML
INJECTION INTRAVENOUS
Status: DISCONTINUED | OUTPATIENT
Start: 2019-04-29 | End: 2019-04-29

## 2019-04-29 RX ORDER — PROPOFOL 10 MG/ML
VIAL (ML) INTRAVENOUS
Status: DISCONTINUED | OUTPATIENT
Start: 2019-04-29 | End: 2019-04-29

## 2019-04-29 RX ORDER — SODIUM CHLORIDE 0.9 % (FLUSH) 0.9 %
3 SYRINGE (ML) INJECTION
Status: DISCONTINUED | OUTPATIENT
Start: 2019-04-29 | End: 2019-04-29 | Stop reason: HOSPADM

## 2019-04-29 RX ORDER — SODIUM CHLORIDE 9 MG/ML
INJECTION, SOLUTION INTRAVENOUS CONTINUOUS PRN
Status: DISCONTINUED | OUTPATIENT
Start: 2019-04-29 | End: 2019-04-29

## 2019-04-29 RX ORDER — PROPOFOL 10 MG/ML
VIAL (ML) INTRAVENOUS CONTINUOUS PRN
Status: DISCONTINUED | OUTPATIENT
Start: 2019-04-29 | End: 2019-04-29

## 2019-04-29 RX ORDER — HYDROMORPHONE HYDROCHLORIDE 1 MG/ML
0.2 INJECTION, SOLUTION INTRAMUSCULAR; INTRAVENOUS; SUBCUTANEOUS EVERY 5 MIN PRN
Status: DISCONTINUED | OUTPATIENT
Start: 2019-04-29 | End: 2019-04-29 | Stop reason: HOSPADM

## 2019-04-29 RX ORDER — FENTANYL CITRATE 50 UG/ML
25 INJECTION, SOLUTION INTRAMUSCULAR; INTRAVENOUS EVERY 5 MIN PRN
Status: DISCONTINUED | OUTPATIENT
Start: 2019-04-29 | End: 2019-04-29 | Stop reason: HOSPADM

## 2019-04-29 RX ADMIN — TIOTROPIUM BROMIDE 18 MCG: 18 CAPSULE ORAL; RESPIRATORY (INHALATION) at 08:04

## 2019-04-29 RX ADMIN — LIDOCAINE HYDROCHLORIDE 60 MG: 20 INJECTION, SOLUTION INTRAVENOUS at 11:04

## 2019-04-29 RX ADMIN — FLUTICASONE FUROATE AND VILANTEROL TRIFENATATE 1 PUFF: 100; 25 POWDER RESPIRATORY (INHALATION) at 02:04

## 2019-04-29 RX ADMIN — PROPOFOL 100 MCG/KG/MIN: 10 INJECTION, EMULSION INTRAVENOUS at 11:04

## 2019-04-29 RX ADMIN — SODIUM CHLORIDE: 0.9 INJECTION, SOLUTION INTRAVENOUS at 11:04

## 2019-04-29 RX ADMIN — ESMOLOL HYDROCHLORIDE 15 MG: 10 INJECTION INTRAVENOUS at 11:04

## 2019-04-29 RX ADMIN — PIPERACILLIN AND TAZOBACTAM 4.5 G: 4; .5 INJECTION, POWDER, LYOPHILIZED, FOR SOLUTION INTRAVENOUS; PARENTERAL at 10:04

## 2019-04-29 RX ADMIN — ESMOLOL HYDROCHLORIDE 10 MG: 10 INJECTION INTRAVENOUS at 11:04

## 2019-04-29 RX ADMIN — ENOXAPARIN SODIUM 40 MG: 100 INJECTION SUBCUTANEOUS at 04:04

## 2019-04-29 RX ADMIN — PHENYLEPHRINE HYDROCHLORIDE 200 MCG: 10 INJECTION INTRAVENOUS at 12:04

## 2019-04-29 RX ADMIN — IOHEXOL 12 ML: 300 INJECTION, SOLUTION INTRAVENOUS at 12:04

## 2019-04-29 RX ADMIN — PIPERACILLIN AND TAZOBACTAM 4.5 G: 4; .5 INJECTION, POWDER, LYOPHILIZED, FOR SOLUTION INTRAVENOUS; PARENTERAL at 02:04

## 2019-04-29 RX ADMIN — PROPOFOL 20 MG: 10 INJECTION, EMULSION INTRAVENOUS at 11:04

## 2019-04-29 RX ADMIN — PIPERACILLIN AND TAZOBACTAM 4.5 G: 4; .5 INJECTION, POWDER, LYOPHILIZED, FOR SOLUTION INTRAVENOUS; PARENTERAL at 04:04

## 2019-04-29 NOTE — PLAN OF CARE
Patient is awake, alert and oriented to name and -disoriented to time, place and situation. Vital signs are stable and within normal limit. Patient is warm and comfortable as verbalized.

## 2019-04-29 NOTE — TRANSFER OF CARE
"Anesthesia Transfer of Care Note    Patient: Zeynep Hamm    Procedure(s) Performed: Procedure(s) (LRB):  ERCP (ENDOSCOPIC RETROGRADE CHOLANGIOPANCREATOGRAPHY) (N/A)  ULTRASOUND, UPPER GI TRACT, ENDOSCOPIC (N/A)    Patient location: PACU    Anesthesia Type: general    Transport from OR: Transported from OR on 2-3 L/min O2 by NC with adequate spontaneous ventilation    Post pain: adequate analgesia    Post assessment: no apparent anesthetic complications and tolerated procedure well    Post vital signs: stable    Level of consciousness: alert and awake    Nausea/Vomiting: no nausea/vomiting    Complications: none    Transfer of care protocol was followed      Last vitals:   Visit Vitals  /60   Pulse 80   Temp 36.6 °C (97.9 °F) (Temporal)   Resp 18   Ht 5' 3" (1.6 m)   Wt 48.5 kg (107 lb)   SpO2 97%   Breastfeeding? No   BMI 18.95 kg/m²     "

## 2019-04-29 NOTE — PLAN OF CARE
Problem: Adult Inpatient Plan of Care  Goal: Plan of Care Review  Outcome: Ongoing (interventions implemented as appropriate)  Plan of care reviewed with daughter and patient, although patient does have dementia and does not comprehend the care she is currently receiving.  Pt received her dosage of antibiotics through IV without an issue. She used the restroom several times, although family is concerned b/c pt does not have much appetite. Pt needs to be encouraged to eat and drink.  She is expected to be NPO starting tonight for ERCP Monday.  Pt's family wanted to hold the Lovenox injection tonight b/c of that expected procedure tomorrow.  Also patient ambulated in hallway today with PT and did well.  Pt still has not had a bowel movement yet b/c she has not had any appetite and refuses to eat although family got her to take down a little of her meal.  Otherwise, pt's vitals stable, nothing significant to report.

## 2019-04-29 NOTE — PLAN OF CARE
Plan of care reviewed with patient and daughter, Ramon, who is power of . Ramon verbalizes understanding.

## 2019-04-29 NOTE — PLAN OF CARE
SW emailed pt's daughter a [employer] SNF list.    Thao Grant, [facility]  [phone]

## 2019-04-29 NOTE — H&P
Short Stay Endoscopy History and Physical    PCP - Maya Kelley MD  Referring Physician - Aaareferral Self  No address on file    Procedure - EUS/ERCP  ASA - per anesthesia  Mallampati - per anesthesia  History of Anesthesia problems - see anesthesia note  Family history Anesthesia problems -  see anesthesia note  Plan of anesthesia - as per anesthesia    HPI  87 y.o. female    Reason for procedure:  pancreas mass    Medical History:  has a past medical history of Alzheimer disease, Bronchiectasis, Cataract, Celiac disease, Emphysema of lung, Encounter for blood transfusion, Lung disease, and Short-term memory loss.    Surgical History:  has a past surgical history that includes Tonsillectomy; Appendectomy; Cataract extraction w/  intraocular lens implant (Right, 4/14/14); Cataract extraction w/  intraocular lens implant (Left, 5/12/14); and uterine suspension.    Family History: family history includes COPD in her brother; Cancer in her father and mother; Glaucoma in her sister; Heart disease in her sister.    Social History:  reports that she quit smoking about 30 years ago. Her smoking use included cigarettes. She has a 70.00 pack-year smoking history. She has never used smokeless tobacco. She reports that she does not drink alcohol or use drugs.    Review of patient's allergies indicates:   Allergen Reactions    Gluten protein        Medications:   Medications Prior to Admission   Medication Sig Dispense Refill Last Dose    albuterol 90 mcg/actuation inhaler Inhale 2 puffs into the lungs every 4 (four) hours as needed for Wheezing. 1 each 12 4/25/2019    albuterol-ipratropium (DUO-NEB) 2.5 mg-0.5 mg/3 mL nebulizer solution Take 3 mLs by nebulization every 6 (six) hours as needed for Wheezing or Shortness of Breath. Rescue 1 Box 0 4/25/2019    b complex vitamins tablet Take 1 tablet by mouth once daily.       multivitamin capsule Take 1 capsule by mouth once daily.   4/26/2019    SYMBICORT 160-4.5  mcg/actuation HFAA Inhale 2 puffs into the lungs 2 (two) times daily.    4/25/2019    umeclidinium-vilanterol (ANORO ELLIPTA) 62.5-25 mcg/actuation DsDv Inhale 1 puff into the lungs once daily. Controller 1 each 5 4/17/2019    acetaminophen (TYLENOL) 325 MG tablet Take 2 tablets (650 mg total) by mouth every 6 (six) hours as needed.  0 More than a month    furosemide (LASIX) 20 MG tablet Take 1 tablet (20 mg total) by mouth as needed. For Weight Gain > 2-3 lbs in 1 day or 4-6 lbs, please give lasix 20mg PO as needed 30 tablet 11        Physical Exam:    Vital Signs:   Vitals:    04/29/19 1029   BP: 113/81   Pulse: 72   Resp: 18   Temp: 97.9 °F (36.6 °C)       General Appearance: Well appearing in no acute distress  Lungs: No respiratory distress.   Heart:  Regular rate, S1, S2 normal.  Abdomen: Soft, non tender, non distended with normal bowel sounds, no masses    Labs:  Lab Results   Component Value Date    WBC 16.49 (H) 04/29/2019    HGB 11.3 (L) 04/29/2019    HCT 34.6 (L) 04/29/2019    MCV 86 04/29/2019     04/29/2019     No results found for: INR  No results found for: IRON, FERRITIN, TIBC, FESATURATED  Lab Results   Component Value Date     04/29/2019    K 3.9 04/29/2019     04/29/2019    CO2 22 (L) 04/29/2019    BUN 12 04/29/2019    CREATININE 0.7 04/29/2019       I have explained the risks and benefits of this endoscopic procedure to the patient/family including but not limited to bleeding, inflammation/pancreatitis, infection, perforation, hypoxia/respiratory failure, and death.       Zo Torrez MD  Gastroenterology & Hepatology Fellow  Pager: [phone]

## 2019-04-29 NOTE — PROGRESS NOTES
Pt left the floor for endoscopy on stretcher with transporter and family at bedside . Pt is A,A,O x 2. Stable V/S . No C/O pain . Pt is NPO .

## 2019-04-29 NOTE — ANESTHESIA POSTPROCEDURE EVALUATION
Anesthesia Post Evaluation    Patient: Zeynep Hamm    Procedure(s) Performed: Procedure(s) (LRB):  ERCP (ENDOSCOPIC RETROGRADE CHOLANGIOPANCREATOGRAPHY) (N/A)  ULTRASOUND, UPPER GI TRACT, ENDOSCOPIC (N/A)    Final Anesthesia Type: general  Patient location during evaluation: PACU  Patient participation: Yes- Able to Participate  Level of consciousness: awake and alert  Post-procedure vital signs: reviewed and stable  Pain management: adequate  Airway patency: patent  PONV status at discharge: No PONV  Anesthetic complications: no      Cardiovascular status: blood pressure returned to baseline  Respiratory status: unassisted  Hydration status: euvolemic  Follow-up not needed.          Vitals Value Taken Time   /59 4/29/2019  1:01 PM   Temp 36.5 °C (97.7 °F) 4/29/2019 12:28 PM   Pulse 78 4/29/2019  1:07 PM   Resp 28 4/29/2019  1:07 PM   SpO2 96 % 4/29/2019  1:13 PM   Vitals shown include unvalidated device data.      Event Time     Out of Recovery 04/29/2019 13:13:48          Pain/Brandi Score: Brandi Score: 10 (4/29/2019 12:45 PM)

## 2019-04-29 NOTE — ANESTHESIA PREPROCEDURE EVALUATION
04/29/2019  Zeynep Hamm is a 87 y.o., female.    Anesthesia Evaluation         Review of Systems  Anesthesia Hx:  No problems with previous Anesthesia   Social:  Former Smoker, No Alcohol Use    Cardiovascular:   CHF    Pulmonary:   COPD Shortness of breath Postinflammatory pulmonary fibrosis  Adult bronchiectasis     Psych:   Psychiatric History (Alzheimer disease)          Physical Exam  General:  Well nourished    Airway/Jaw/Neck:  Airway Findings: Mouth Opening: Normal Tongue: Normal  General Airway Assessment: Adult            Mental Status:  Mental Status Findings:  Cooperative         Anesthesia Plan  Type of Anesthesia, risks & benefits discussed:  Anesthesia Type:  general  Patient's Preference:   Intra-op Monitoring Plan: standard ASA monitors  Intra-op Monitoring Plan Comments:   Post Op Pain Control Plan:   Post Op Pain Control Plan Comments:   Induction:   IV  Beta Blocker:  Patient is not currently on a Beta-Blocker (No further documentation required).       Informed Consent: Patient understands risks and agrees with Anesthesia plan.  Questions answered. Anesthesia consent signed with patient.  ASA Score: 3     Day of Surgery Review of History & Physical:    H&P update referred to the provider.         Ready For Surgery From Anesthesia Perspective.

## 2019-04-29 NOTE — [employer] PATIENT INSTRUCTIONS
Discharge Summary/Instructions after an Endoscopic Procedure  Patient Name: Zeynep Hamm  Patient MRN: [id number]  Patient YOB: 1931 Monday, April 29, 2019  Emery Flores MD  RESTRICTIONS:  During your procedure today, you received medications for sedation.  These   medications may affect your judgment, balance and coordination.  Therefore,   for 24 hours, you have the following restrictions:   - DO NOT drive a car, operate machinery, make legal/financial decisions,   sign important papers or drink alcohol.    ACTIVITY:  Today: no heavy lifting, straining or running due to procedural   sedation/anesthesia.  The following day: return to full activity including work.  DIET:  Eat and drink normally unless instructed otherwise.     TREATMENT FOR COMMON SIDE EFFECTS:  - Mild abdominal pain, nausea, belching, bloating or excessive gas:  rest,   eat lightly and use a heating pad.  - Sore Throat: treat with throat lozenges and/or gargle with warm salt   water.  - Because air was used during the procedure, expelling large amounts of air   from your rectum or belching is normal.  - If a bowel prep was taken, you may not have a bowel movement for 1-3 days.    This is normal.  SYMPTOMS TO WATCH FOR AND REPORT TO YOUR PHYSICIAN:  1. Abdominal pain or bloating, other than gas cramps.  2. Chest pain.  3. Back pain.  4. Signs of infection such as: chills or fever occurring within 24 hours   after the procedure.  5. Rectal bleeding, which would show as bright red, maroon, or black stools.   (A tablespoon of blood from the rectum is not serious, especially if   hemorrhoids are present.)  6. Vomiting.  7. Weakness or dizziness.  GO DIRECTLY TO THE NEAREST EMERGENCY ROOM IF YOU HAVE ANY OF THE FOLLOWING:      Difficulty breathing              Chills and/or fever over 101 F   Persistent vomiting and/or vomiting blood   Severe abdominal pain   Severe chest pain   Black, tarry stools   Bleeding- more than one  tablespoon   Any other symptom or condition that you feel may need urgent attention  Your doctor recommends these additional instructions:  If any biopsies were taken, your doctors clinic will contact you in 1 to 2   weeks with any results.  - [facility]   - Recommend oncology consult (inpt vs outpt).  - Await cytology results.  - Findings discussed with pt's family.  - Advance diet as tolerated.  For questions, problems or results please call your physician - Emery Flores MD at Work:  [phone].  [facility], EMERGENCY ROOM PHONE NUMBER: [phone]  IF A COMPLICATION OR EMERGENCY SITUATION ARISES AND YOU ARE UNABLE TO REACH   YOUR PHYSICIAN - GO DIRECTLY TO THE EMERGENCY ROOM.  Emery Flores MD  4/29/2019 1:14:11 PM  This report has been verified and signed electronically.  [employer]

## 2019-04-29 NOTE — INTERVAL H&P NOTE
The patient has been examined and the H&P has been reviewed:    I concur with the findings and no changes have occurred since H&P was written.    Anesthesia/Surgery risks, benefits and alternative options discussed and understood by patient/family.          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

## 2019-04-29 NOTE — PROGRESS NOTES
Pt has no urine out put all day , pt's daughter said this is normal for her mother and she usually urinate once every 2 days . Pt denies fullness and refused to use the bathroom . Bladder scan done 265 cc . IM 5 Dr notified and he said he will review pt's chart and labs and order to keep watching the pt . Will continue monitoring .

## 2019-04-29 NOTE — PROGRESS NOTES
[facility] Hospital Medicine  Progress Note    Patient Name: Zeynep Hamm  MRN: [id number]  Patient Class: IP- Inpatient   Admission Date: 4/26/2019  Length of Stay: 3 days  Attending Physician: Michelle Nunez MD  Primary Care Provider: Maya Kelley MD    [facility] Medicine Team: [facility] MED 5 Anitra Michaud MD    Subjective:     Principal Problem:Cholangitis    HPI:  87 years old female with past medical history of Alzheimer, COPD, Cataract, Celiac disease, Diastolic HF (EF 45%) presented to ED with fatigue.  Per patient daughter, she has been increasingly getting weaker over the last 3 weeks to a point where she is unable to stand up today and that's when the daughter decided to bring the patient to ED, at baseline patient able to walk short distance, she also has been sleeping more, eat less than usual with gradual wieght loss over coupleof weeks, noticed to have yellowish skin discoloration today, otherwise has no fever, chills, chest pain, shortness of breath, abdominal pain, nausea, vomiting, diarrhea and urinary symptoms. Patient quit smoking 35 years ago, doesn't drink alcohol, no recent surgeries or travel, has family history of leukemia and mediastinal mass.She was admitted to the hospital recently on 4/18/2019 with COPD exacerbation and new onset systolic congestive heart failure, started on lasix.  At ED patient patient was hypotensive and tachycardic, labs significant for elevated WBC, LFTs and bilirubin, U/S abdomin showed head of pancrease mass with extra and intrahepatic biliary ductal dilatation and hepatic lesions.        Hospital Course:  Admitted for suspect cholangitis, now improved on Abx awaiting EUS on Monday for pancreatic mass.     Interval History: Doing well overnight. S/p EUS & ERCP, doing well      Review of Systems   Constitutional: Negative for chills and fever.   Respiratory: Negative for cough.    Cardiovascular: Negative for chest pain and palpitations.    Gastrointestinal: Negative for abdominal pain.   Genitourinary: Negative for dysuria.     Objective:     Vital Signs (Most Recent):  Temp: 97.8 °F (36.6 °C) (04/29/19 1614)  Pulse: 95 (04/29/19 1614)  Resp: 18 (04/29/19 1614)  BP: 108/75 (04/29/19 1614)  SpO2: (!) 92 % (04/29/19 1614) Vital Signs (24h Range):  Temp:  [96.9 °F (36.1 °C)-97.9 °F (36.6 °C)] 97.8 °F (36.6 °C)  Pulse:  [69-95] 95  Resp:  [16-18] 18  SpO2:  [88 %-100 %] 92 %  BP: (108-138)/(57-81) 108/75     Weight: 48.5 kg (107 lb)  Body mass index is 18.95 kg/m².    Intake/Output Summary (Last 24 hours) at 4/29/2019 1648  Last data filed at 4/29/2019 1204  Gross per 24 hour   Intake 485 ml   Output --   Net 485 ml      Physical Exam   Constitutional: She appears well-developed. No distress.   thin   HENT:   Head: Normocephalic and atraumatic.   Eyes: Pupils are equal, round, and reactive to light.   Neck: Neck supple.   Cardiovascular: Normal rate, regular rhythm and normal heart sounds.   Pulmonary/Chest: Effort normal and breath sounds normal. No respiratory distress.   Abdominal: Soft. Bowel sounds are normal. She exhibits no distension. There is tenderness (Very mild, RUQ).   Musculoskeletal: She exhibits no edema or tenderness.   Lymphadenopathy:     She has no cervical adenopathy.   Neurological: She is alert.   disoriented to time and situation, but alert to place.   Pleasant    Skin: She is not diaphoretic. No pallor.   slightly jaundiced   Psychiatric: She has a normal mood and affect.   Vitals reviewed.      Significant Labs: All pertinent labs within the past 24 hours have been reviewed.    Significant Imaging: I have reviewed all pertinent imaging results/findings within the past 24 hours.    Assessment/Plan:      * Cholangitis  AES aware and on board. Improved with IV Zosyn   - Plan for possible EUS Monday, will scope sooner if clinically deteriorates               Metastasis to liver        Pancreatic mass  CT abdomen & U/s show   - A  mass was identified in the pancreatic head. This was staged T3 Nx M1 (based on metastasis to liver and pending cytologic confirmation). The staging applies if malignancy is confirmed.    -onc consulted, appreciate recs & planning outpatient management    Altered mental status, unspecified        Goals of care, counseling/discussion  Made DNR, Brief goals discuss.  Expect discharge to hospice         COPD (chronic obstructive pulmonary disease)  Stable Patient on Combo ICS LABA and LABA LAMA outpatient   - continue ICS, LABA, Added LAMA (spiriva),   - breathing treatment as needed        Neoplasm of head of pancreas  Newly diagnosed, with what is likely mets to the liver  - Plan to discuss with daughter who per patient is POA   - Consider Heme/onc consult once path is obtained.        Congestive heart failure (CHF)  Borderline EF with signs of diastolic dysfunction  - Holding BB given severe COPD  - BP control to help reduce risk of flash Pulm edema.      Celiac disease  Stable no active issue     Plan:   - gluten free diet         Dementia in Alzheimer's disease  Patient mental status at baseline    Plan:  - Delirium precautions  - PT/OT             VTE Risk Mitigation (From admission, onward)        Ordered     enoxaparin injection 40 mg  Daily      04/26/19 1926     IP VTE HIGH RISK PATIENT  Once      04/26/19 1926     Place CAT hose  Until discontinued      04/26/19 1845     Place sequential compression device  Until discontinued      04/26/19 1845              Anitra Michaud MD  Department of Hospital Medicine   [facility]

## 2019-04-29 NOTE — PLAN OF CARE
Problem: Adult Inpatient Plan of Care  Goal: Plan of Care Review  Outcome: Ongoing (interventions implemented as appropriate)     04/29/19 9377   Plan of Care Review   Plan of Care Reviewed With patient;daughter   Pt is a,A,O x 2 . Stable V/s. No C/O pain during the day . Pt slept between care . Pt is  Able to ambulate to the chair with assist , pt remains free of falls and injuries . Fall precautions maintained and family remains at the bedside . ERCP done today . Pt did not use the bathroom  today and her daughter said that this is normal for her she said usually she goes once every 2 days . Bladder scan done and result is 265 cc , IM 5 paged to notify . Will keep monitoring .

## 2019-04-29 NOTE — NURSING TRANSFER
Nursing Transfer Note      4/29/2019     Transfer To: [id number]    Transfer via stretcher    Transfer with N/A    Transported by PCT    Medicines sent: N/A    Chart send with patient: Yes    Notified: daughter (attempted)    Patient reassessed at: 4/29/2019

## 2019-04-29 NOTE — PROVATION PATIENT INSTRUCTIONS
Discharge Summary/Instructions after an Endoscopic Procedure  Patient Name: Zeynep Hamm  Patient MRN: [id number]  Patient YOB: 1931 Monday, April 29, 2019  Emery Flores MD  RESTRICTIONS:  During your procedure today, you received medications for sedation.  These   medications may affect your judgment, balance and coordination.  Therefore,   for 24 hours, you have the following restrictions:   - DO NOT drive a car, operate machinery, make legal/financial decisions,   sign important papers or drink alcohol.    ACTIVITY:  Today: no heavy lifting, straining or running due to procedural   sedation/anesthesia.  The following day: return to full activity including work.  DIET:  Eat and drink normally unless instructed otherwise.     TREATMENT FOR COMMON SIDE EFFECTS:  - Mild abdominal pain, nausea, belching, bloating or excessive gas:  rest,   eat lightly and use a heating pad.  - Sore Throat: treat with throat lozenges and/or gargle with warm salt   water.  - Because air was used during the procedure, expelling large amounts of air   from your rectum or belching is normal.  - If a bowel prep was taken, you may not have a bowel movement for 1-3 days.    This is normal.  SYMPTOMS TO WATCH FOR AND REPORT TO YOUR PHYSICIAN:  1. Abdominal pain or bloating, other than gas cramps.  2. Chest pain.  3. Back pain.  4. Signs of infection such as: chills or fever occurring within 24 hours   after the procedure.  5. Rectal bleeding, which would show as bright red, maroon, or black stools.   (A tablespoon of blood from the rectum is not serious, especially if   hemorrhoids are present.)  6. Vomiting.  7. Weakness or dizziness.  GO DIRECTLY TO THE NEAREST EMERGENCY ROOM IF YOU HAVE ANY OF THE FOLLOWING:      Difficulty breathing              Chills and/or fever over 101 F   Persistent vomiting and/or vomiting blood   Severe abdominal pain   Severe chest pain   Black, tarry stools   Bleeding- more than one  tablespoon   Any other symptom or condition that you feel may need urgent attention  Your doctor recommends these additional instructions:  If any biopsies were taken, your doctors clinic will contact you in 1 to 2   weeks with any results.  - [facility] to [facility] for [facility] care.   - Perform an ERCP today.   - Await cytology results.  For questions, problems or results please call your physician - Emery Flores MD at Work:  [phone].  [facility], EMERGENCY ROOM PHONE NUMBER: [phone]  IF A COMPLICATION OR EMERGENCY SITUATION ARISES AND YOU ARE UNABLE TO REACH   YOUR PHYSICIAN - GO DIRECTLY TO THE EMERGENCY ROOM.  Emery Flores MD  4/29/2019 1:09:49 PM  This report has been verified and signed electronically.  [employer]

## 2019-04-29 NOTE — SUBJECTIVE & OBJECTIVE
Interval History: Doing well overnight. S/p EUS & ERCP, doing well      Review of Systems   Constitutional: Negative for chills and fever.   Respiratory: Negative for cough.    Cardiovascular: Negative for chest pain and palpitations.   Gastrointestinal: Negative for abdominal pain.   Genitourinary: Negative for dysuria.     Objective:     Vital Signs (Most Recent):  Temp: 97.8 °F (36.6 °C) (04/29/19 1614)  Pulse: 95 (04/29/19 1614)  Resp: 18 (04/29/19 1614)  BP: 108/75 (04/29/19 1614)  SpO2: (!) 92 % (04/29/19 1614) Vital Signs (24h Range):  Temp:  [96.9 °F (36.1 °C)-97.9 °F (36.6 °C)] 97.8 °F (36.6 °C)  Pulse:  [69-95] 95  Resp:  [16-18] 18  SpO2:  [88 %-100 %] 92 %  BP: (108-138)/(57-81) 108/75     Weight: 48.5 kg (107 lb)  Body mass index is 18.95 kg/m².    Intake/Output Summary (Last 24 hours) at 4/29/2019 1648  Last data filed at 4/29/2019 1204  Gross per 24 hour   Intake 485 ml   Output --   Net 485 ml      Physical Exam   Constitutional: She appears well-developed. No distress.   thin   HENT:   Head: Normocephalic and atraumatic.   Eyes: Pupils are equal, round, and reactive to light.   Neck: Neck supple.   Cardiovascular: Normal rate, regular rhythm and normal heart sounds.   Pulmonary/Chest: Effort normal and breath sounds normal. No respiratory distress.   Abdominal: Soft. Bowel sounds are normal. She exhibits no distension. There is tenderness (Very mild, RUQ).   Musculoskeletal: She exhibits no edema or tenderness.   Lymphadenopathy:     She has no cervical adenopathy.   Neurological: She is alert.   disoriented to time and situation, but alert to place.   Pleasant    Skin: She is not diaphoretic. No pallor.   slightly jaundiced   Psychiatric: She has a normal mood and affect.   Vitals reviewed.      Significant Labs: All pertinent labs within the past 24 hours have been reviewed.    Significant Imaging: I have reviewed all pertinent imaging results/findings within the past 24 hours.

## 2019-04-29 NOTE — PLAN OF CARE
Problem: Adult Inpatient Plan of Care  Goal: Plan of Care Review  Outcome: Ongoing (interventions implemented as appropriate)  AOx1. Lying down in bed with eyes closed. Easily aroused when name called. Denies pain or discomfort. Able to make some needs known. Requires asst with ADLs and transfers. ABT therapy continues with no adverse reactions noted. Afebrile. NADN at this time.

## 2019-04-29 NOTE — PROGRESS NOTES
Pt is back to the floor from endoscopy . Pt is A,a,O x 2 . Stable V/s. Pt's O2 sat is 88% , pt placed on O2 N/C 1 L and O2 sat 91%. . Will keep monitoring .

## 2019-04-29 NOTE — ASSESSMENT & PLAN NOTE
CT abdomen & U/s show   - A mass was identified in the pancreatic head. This was staged T3 Nx M1 (based on metastasis to liver and pending cytologic confirmation). The staging applies if malignancy is confirmed.    -onc consulted, appreciate recs & planning outpatient management

## 2019-04-30 PROBLEM — Z51.5 PALLIATIVE CARE ENCOUNTER: Status: ACTIVE | Noted: 2019-04-30

## 2019-04-30 PROBLEM — K83.09 ACUTE CHOLANGITIS: Status: ACTIVE | Noted: 2019-04-30

## 2019-04-30 PROBLEM — Z66 DNR (DO NOT RESUSCITATE): Status: ACTIVE | Noted: 2019-04-30

## 2019-04-30 LAB
ALBUMIN SERPL BCP-MCNC: 1.9 G/DL (ref 3.5–5.2)
ALP SERPL-CCNC: 258 U/L (ref 55–135)
ALT SERPL W/O P-5'-P-CCNC: 49 U/L (ref 10–44)
ANION GAP SERPL CALC-SCNC: 13 MMOL/L (ref 8–16)
AST SERPL-CCNC: 71 U/L (ref 10–40)
BASOPHILS # BLD AUTO: 0.06 K/UL (ref 0–0.2)
BASOPHILS NFR BLD: 0.4 % (ref 0–1.9)
BILIRUB SERPL-MCNC: 6.2 MG/DL (ref 0.1–1)
BUN SERPL-MCNC: 16 MG/DL (ref 8–23)
CALCIUM SERPL-MCNC: 8.2 MG/DL (ref 8.7–10.5)
CANCER AG19-9 SERPL-ACNC: ABNORMAL U/ML (ref 2–40)
CHLORIDE SERPL-SCNC: 106 MMOL/L (ref 95–110)
CO2 SERPL-SCNC: 20 MMOL/L (ref 23–29)
CREAT SERPL-MCNC: 0.7 MG/DL (ref 0.5–1.4)
DIFFERENTIAL METHOD: ABNORMAL
EOSINOPHIL # BLD AUTO: 0.1 K/UL (ref 0–0.5)
EOSINOPHIL NFR BLD: 0.9 % (ref 0–8)
ERYTHROCYTE [DISTWIDTH] IN BLOOD BY AUTOMATED COUNT: 15.6 % (ref 11.5–14.5)
EST. GFR  (AFRICAN AMERICAN): >60 ML/MIN/1.73 M^2
EST. GFR  (NON AFRICAN AMERICAN): >60 ML/MIN/1.73 M^2
GLUCOSE SERPL-MCNC: 91 MG/DL (ref 70–110)
HCT VFR BLD AUTO: 34.7 % (ref 37–48.5)
HGB BLD-MCNC: 11.8 G/DL (ref 12–16)
IMM GRANULOCYTES # BLD AUTO: 0.12 K/UL (ref 0–0.04)
IMM GRANULOCYTES NFR BLD AUTO: 0.9 % (ref 0–0.5)
LYMPHOCYTES # BLD AUTO: 0.9 K/UL (ref 1–4.8)
LYMPHOCYTES NFR BLD: 6.9 % (ref 18–48)
MCH RBC QN AUTO: 29.1 PG (ref 27–31)
MCHC RBC AUTO-ENTMCNC: 34 G/DL (ref 32–36)
MCV RBC AUTO: 86 FL (ref 82–98)
MONOCYTES # BLD AUTO: 1.1 K/UL (ref 0.3–1)
MONOCYTES NFR BLD: 8.1 % (ref 4–15)
NEUTROPHILS # BLD AUTO: 11.2 K/UL (ref 1.8–7.7)
NEUTROPHILS NFR BLD: 82.8 % (ref 38–73)
NRBC BLD-RTO: 0 /100 WBC
PLATELET # BLD AUTO: 228 K/UL (ref 150–350)
PMV BLD AUTO: 10.8 FL (ref 9.2–12.9)
POTASSIUM SERPL-SCNC: 4.1 MMOL/L (ref 3.5–5.1)
PROT SERPL-MCNC: 5.6 G/DL (ref 6–8.4)
RBC # BLD AUTO: 4.05 M/UL (ref 4–5.4)
SODIUM SERPL-SCNC: 139 MMOL/L (ref 136–145)
WBC # BLD AUTO: 13.53 K/UL (ref 3.9–12.7)

## 2019-04-30 PROCEDURE — 86301 IMMUNOASSAY TUMOR CA 19-9: CPT | Mod: HCNC

## 2019-04-30 PROCEDURE — 25000003 PHARM REV CODE 250: Mod: HCNC | Performed by: STUDENT IN AN ORGANIZED HEALTH CARE EDUCATION/TRAINING PROGRAM

## 2019-04-30 PROCEDURE — 85025 COMPLETE CBC W/AUTO DIFF WBC: CPT | Mod: HCNC

## 2019-04-30 PROCEDURE — 99232 PR SUBSEQUENT HOSPITAL CARE,LEVL II: ICD-10-PCS | Mod: HCNC,GC,, | Performed by: INTERNAL MEDICINE

## 2019-04-30 PROCEDURE — 80053 COMPREHEN METABOLIC PANEL: CPT | Mod: HCNC

## 2019-04-30 PROCEDURE — 99223 1ST HOSP IP/OBS HIGH 75: CPT | Mod: HCNC,,, | Performed by: CLINICAL NURSE SPECIALIST

## 2019-04-30 PROCEDURE — 11000001 HC ACUTE MED/SURG PRIVATE ROOM: Mod: HCNC

## 2019-04-30 PROCEDURE — 99223 PR INITIAL HOSPITAL CARE,LEVL III: ICD-10-PCS | Mod: HCNC,,, | Performed by: CLINICAL NURSE SPECIALIST

## 2019-04-30 PROCEDURE — 63600175 PHARM REV CODE 636 W HCPCS: Mod: HCNC | Performed by: STUDENT IN AN ORGANIZED HEALTH CARE EDUCATION/TRAINING PROGRAM

## 2019-04-30 PROCEDURE — 27000221 HC OXYGEN, UP TO 24 HOURS: Mod: HCNC

## 2019-04-30 PROCEDURE — 94761 N-INVAS EAR/PLS OXIMETRY MLT: CPT | Mod: HCNC

## 2019-04-30 PROCEDURE — 99232 SBSQ HOSP IP/OBS MODERATE 35: CPT | Mod: HCNC,GC,, | Performed by: INTERNAL MEDICINE

## 2019-04-30 PROCEDURE — 25000242 PHARM REV CODE 250 ALT 637 W/ HCPCS: Mod: HCNC | Performed by: STUDENT IN AN ORGANIZED HEALTH CARE EDUCATION/TRAINING PROGRAM

## 2019-04-30 PROCEDURE — 36415 COLL VENOUS BLD VENIPUNCTURE: CPT | Mod: HCNC

## 2019-04-30 RX ORDER — METRONIDAZOLE 500 MG/1
500 TABLET ORAL EVERY 8 HOURS
Status: DISCONTINUED | OUTPATIENT
Start: 2019-04-30 | End: 2019-05-03

## 2019-04-30 RX ORDER — CIPROFLOXACIN 500 MG/1
500 TABLET ORAL EVERY 12 HOURS
Status: DISCONTINUED | OUTPATIENT
Start: 2019-04-30 | End: 2019-05-03

## 2019-04-30 RX ADMIN — FLUTICASONE FUROATE AND VILANTEROL TRIFENATATE 1 PUFF: 100; 25 POWDER RESPIRATORY (INHALATION) at 08:04

## 2019-04-30 RX ADMIN — PIPERACILLIN AND TAZOBACTAM 4.5 G: 4; .5 INJECTION, POWDER, LYOPHILIZED, FOR SOLUTION INTRAVENOUS; PARENTERAL at 06:04

## 2019-04-30 RX ADMIN — METRONIDAZOLE 500 MG: 500 TABLET ORAL at 09:04

## 2019-04-30 RX ADMIN — TIOTROPIUM BROMIDE 18 MCG: 18 CAPSULE ORAL; RESPIRATORY (INHALATION) at 08:04

## 2019-04-30 RX ADMIN — PIPERACILLIN AND TAZOBACTAM 4.5 G: 4; .5 INJECTION, POWDER, LYOPHILIZED, FOR SOLUTION INTRAVENOUS; PARENTERAL at 02:04

## 2019-04-30 RX ADMIN — CIPROFLOXACIN HYDROCHLORIDE 500 MG: 500 TABLET, FILM COATED ORAL at 09:04

## 2019-04-30 RX ADMIN — ENOXAPARIN SODIUM 40 MG: 100 INJECTION SUBCUTANEOUS at 04:04

## 2019-04-30 NOTE — CONSULTS
[facility]  Palliative Medicine  Consult Note    Patient Name: Zeynep Hamm  MRN: [id number]  Admission Date: 4/26/2019  Hospital Length of Stay: 4 days  Code Status: DNR   Attending Provider: Michelle Nunez MD  Consulting Provider: DEANDRE Manzanares, CNS  Primary Care Physician: Maya Kelley MD  Principal Problem:Neoplasm of head of pancreas    Patient information was obtained from patient, relative(s) and past medical records.      Inpatient consult to Palliative Care  Consult performed by: DEANDRE Jovel, CNS  Consult ordered by: Anitra Michaud MD        Assessment/Plan:     Palliative care encounter  Impression: 87-year-old female with PMH of Alzheimer dementia, CHF with positive recent CT of the abdomen for large pancreatic head mass concerning for primary pancreatic malignancy and innumerable large liver lesions concerning for metastatic disease. Tissue was obtained for pathology from lesion in the left lobe of the liver.  Oncology consulted. Awaiting final pathology report.    Patient has declining performance status. Short term memory loss with intermitent confusion secondary to dementia.  Patient is aware of her basic clinical status but pts'dght is awaiting final pathology and / or more information from oncology to discuss thoroughly with her mother.  Patient's daughter has excellent understanding and insight concerning her mother's probable cancer and overall prognosis. Patient is home hospice and nursing home with hospice appropriate.      Recommendations/Plan  1) await patient's daughters discussion with oncology to finalize plan  2) probable home hospice vs nursing home with hospice care  3) Patient's daughter is researching and touring facilities  4) Will meet with patient and her daughter again tomorrow afternoon    Goals of Care  Family conference conducted by this APRN with the patient and the pt's daughter to discuss pt's current clinical status, goals of  care, long term expected outcomes and poor prognosis. Patient is aware of her basic clinical status but is comfortable with me to speaking to her daughter.  Long discussion with patient's daughter who is has excellent understanding and insight concerning her mother's probable cancer and overall prognosis. She is awaiting final pathology and / or more information from oncology to discuss thoroughly with her mother. She stated that her mother has never wanted to in a facility but she feels this maybe the best first step.  She asked questions concerning hospice care at home and in the nursing home.  Questions were answered to her satisfaction.  [place] but the rest of her family lives her in [place] and is concerned about them having access to visit especially if her life expectancy is short. She knows her brother could not manage her mother at home at this point.    Patient's daughter is researching nursing homes and is going to tour some.  Advised her to look at the Medicare.gov site for ratings and to ask the individual nursing homes what hospices use their facilities.             Thank you for your consult. I will follow-up with patient. Please contact us if you have any additional questions.    Subjective:     HPI:   87-year-old female with PMH of Alzheimer dementia, CHF presented to the ED for progressively worsening weakness, poor appetite and jaundice for the last 2 weeks.  CT of the abdomen revealed large pancreatic head mass concerning for primary pancreatic malignancy.  There were innumerable large hypoattenuating liver lesions concerning for metastatic disease. Her total bilirubin is worsening and a ERCP done today with metal stent was placed into the CBD.  EUS done, tissue was obtained for pathology from lesion in the left lobe of the liver.  Oncology consulted. Awaiting final pathology report.  Oncology recommended palliative care  consult.  Prior to this admission the patient with recent admit on April 18th for COPD exacerbation and was found to have new onset CHF and discharged on Lasix.  However for the past 2 weeks patient continued to become more weak and was unable to ambulate at home.  At baseline patient was able to do her activities of daily living.          Hospital Course:  No notes on file      Past Medical History:   Diagnosis Date    Alzheimer disease     Bronchiectasis     Cataract     both eyes    Celiac disease     Emphysema of lung     Encounter for blood transfusion     Lung disease     Short-term memory loss        Past Surgical History:   Procedure Laterality Date    APPENDECTOMY      CATARACT EXTRACTION W/  INTRAOCULAR LENS IMPLANT Right 4/14/14    CATARACT EXTRACTION W/  INTRAOCULAR LENS IMPLANT Left 5/12/14    ERCP (ENDOSCOPIC RETROGRADE CHOLANGIOPANCREATOGRAPHY) N/A 4/29/2019    Performed by Emery Flores MD at [facility] (2ND FLR)    EXCISION-MASS Left 10/31/2013    Performed by SAL Morgan III, MD at [facility] OR 2ND FLR    EXCISION-SOFT TISSUE - right shin calcium deposits Right 8/19/2015    Performed by SAL Morgan III, MD at [facility] OR 2ND FLR    INSERTION, IOL PROSTHESIS Left 5/12/2014    Performed by [facility] at [facility]    INSERTION, IOL PROSTHESIS Right 4/14/2014    Performed by [facility] at [facility] OR    PHACOEMULSIFICATION, CATARACT Left 5/12/2014    Performed by [facility] at [facility] OR    PHACOEMULSIFICATION, CATARACT Right 4/14/2014    Performed by [facility] at [facility] OR    TONSILLECTOMY      ULTRASOUND, UPPER GI TRACT, ENDOSCOPIC N/A 4/29/2019    Performed by Emer[facility] Flores MD at [facility] END[facility] (2ND FLR)    uterine suspension         Review of patient's allergies indicates:   Allergen Reactions    Gluten protein        Medications:  Continuous Infusions:  Scheduled Meds:   ciprofloxacin HCl  500 mg Oral Q12H    enoxaparin  40 mg Subcutaneous Daily    fluticasone  furoate-vilanterol  1 puff Inhalation Daily    metroNIDAZOLE  500 mg Oral Q8H    tiotropium  1 capsule Inhalation Daily     PRN Meds:acetaminophen, albuterol-ipratropium, dextrose 50%, dextrose 50%, glucagon (human recombinant), glucose, glucose, ondansetron, sodium chloride 0.9%    Family History     Problem Relation (Age of Onset)    COPD Brother    Cancer Mother, Father    Glaucoma Sister    Heart disease Sister        Tobacco Use    Smoking status: Former Smoker     Packs/day: 2.00     Years: 35.00     Pack years: 70.00     Types: Cigarettes     Last attempt to quit: 1988     Years since quittin.9    Smokeless tobacco: Never Used   Substance and Sexual Activity    Alcohol use: No     Alcohol/week: 0.0 oz     Comment: twice a year on special occassions    Drug use: No    Sexual activity: Not Currently     Partners: Male       Review of Systems   Constitutional: Positive for activity change, appetite change and fatigue.   Respiratory: Negative for cough and shortness of breath.    Cardiovascular: Negative for chest pain and leg swelling.   Gastrointestinal: Negative for abdominal distention, abdominal pain, nausea and vomiting.   Skin: Positive for color change and pallor.     Objective:     Vital Signs (Most Recent):  Temp: 97.6 °F (36.4 °C) (19 1131)  Pulse: 73 (19 1138)  Resp: 20 (19 1138)  BP: 112/73 (19 1131)  SpO2: 98 % (19 1138) Vital Signs (24h Range):  Temp:  [97.6 °F (36.4 °C)-97.8 °F (36.6 °C)] 97.6 °F (36.4 °C)  Pulse:  [] 73  Resp:  [16-20] 20  SpO2:  [92 %-99 %] 98 %  BP: (108-127)/(62-75) 112/73     Weight: 48.5 kg (107 lb)  Body mass index is 18.95 kg/m².    Review of Symptoms  Symptom Assessment (ESAS 0-10 scale)   ESAS 0 1 2 3 4 5 6 7 8 9 10   Pain x             Dyspnea x             Anxiety x             Nausea x             Depression               Anorexia    x          Fatigue       x       Insomnia x             Restlessness  x              Agitation x             CAM / Delirium : Alzhiemers, Dementia - short term memory loss  Constipation     _x_ --  ___+   Diarrhea           _x_ --  ___+  Bowel Management Plan (BMP): No    Comments: Patient was able to report symptoms but rating was difficult    OME in 24 hours: 0    Performance Status: 40    ECOG Performance Status thGthrthathdtheth:th th4th Physical Exam   Constitutional: She appears well-developed. She appears ill.   Thin    HENT:   Head: Normocephalic and atraumatic.   Eyes: Scleral icterus is present.   Neck: Normal range of motion.   Cardiovascular: Intact distal pulses and normal pulses.  Occasional extrasystoles are present. Bradycardia present.   Pulmonary/Chest: Breath sounds normal.   Abdominal: Soft. Bowel sounds are normal. There is no tenderness. There is no guarding.   Neurological: She is alert.   Skin: Skin is warm and dry.   Psychiatric: Her behavior is normal. Thought content normal.       Significant Labs: All pertinent labs within the past 24 hours have been reviewed.  CBC:   Recent Labs   Lab 04/30/19  0530   WBC 13.53*   HGB 11.8*   HCT 34.7*   MCV 86        BMP:  Recent Labs   Lab 04/30/19  0530   GLU 91      K 4.1      CO2 20*   BUN 16   CREATININE 0.7   CALCIUM 8.2*     LFT:  Lab Results   Component Value Date    AST 71 (H) 04/30/2019    ALKPHOS 258 (H) 04/30/2019    BILITOT 6.2 (H) 04/30/2019     Albumin:   Albumin   Date Value Ref Range Status   04/30/2019 1.9 (L) 3.5 - 5.2 g/dL Final     Protein:   Total Protein   Date Value Ref Range Status   04/30/2019 5.6 (L) 6.0 - 8.4 g/dL Final     Lactic acid:   Lab Results   Component Value Date    LACTATE 1.7 04/26/2019    LACTATE 2.2 04/18/2019     Advance Care Planning   Advanced Directives::  Living Will: No  LaPOST: No  Do Not Resuscitate Status: Yes  Medical Power of : Yes./Not on chart Agent's Name:Ramon Torrez  Agent's Contact Number [phone]    Decision-Making Capacity: Patient answered questions, Family  answered questions     Living Arrangements: Lives with her son but daughter is primary caregiver    Psychosocial/Cultural: Patient has short term memory loss.  She is  with 3 adult children who are actively involved in her care.  She lives with her son but her daughter is her main caregiver.  Her daughter is a retired RN.       Spiritual:     F- Donna and Belief: Yazidism    I - Importance: somewhat to patient.  More important to daughter  .  C - Community: not active    A - Address in Care:  visits      > 50% of 70 min visit spent in chart review, face to face discussion of goals of care,  symptom assessment, coordination of care and emotional support.    DEANDRE Manzanares, CNS, [facility]  Palliative Medicine  [facility]

## 2019-04-30 NOTE — PLAN OF CARE
Patient lives with adult son in a one story home. Has wheelchair to use as needed. Private sitters visit 2-3 days weekly staying 2-3 hrs each day to prepare meals for patient. Daughter is concerned that patient will require more care than her brother can provide. Discussed that therapy has recommended SNF and choices are needed to place referrals. Daughter eager for patient to have additional therapy but only wants referral placed to [facility] at this time. Discussed the [facility] SNF has no beds, long waiting list and is a short stay facility which may not be a good fit for the patient. Daughter requests list of nursing home snf facilities be emailed to her @ [email or web address]  to provide list for [place] and [place] facilities as requested. Patient also requested Hospice choices although hospice has not at this time been recommended. CM role discussed with family and encouraged to call with needs or concerns.     04/29/19 1154   Discharge Assessment   Assessment Type Discharge Planning Assessment   Confirmed/corrected address and phone number on facesheet? Yes   Assessment information obtained from? Patient   Communicated expected length of stay with patient/caregiver no   Prior to hospitilization cognitive status: Not Oriented to Person;Not Oriented to Place   Prior to hospitalization functional status: Wheelchair Bound   Current cognitive status: Not Oriented to Place;Not Oriented to Time   Current Functional Status: Wheelchair Bound   Lives With child(herberth), adult  (Patient lives with son)   Able to Return to Prior Arrangements no   Is patient able to care for self after discharge? Yes   Who are your caregiver(s) and their phone number(s)?   (Ramon mehta (Daughter) [phone])   Patient's perception of discharge disposition skilled nursing facility   Patient currently being followed by outpatient case management? No   Patient currently receives any other outside agency services? No   Equipment  Currently Used at Home wheelchair   Do you have any problems affording any of your prescribed medications? No   Is the patient taking medications as prescribed? yes   Does the patient have transportation home? Yes   Transportation Anticipated family or friend will provide   Does the patient receive services at [facility]? No   Discharge Plan A Skilled Nursing Facility   Discharge Plan B Home with family;Home Health   DME Needed Upon Discharge  none   Patient/Family in Agreement with Plan yes

## 2019-04-30 NOTE — PHYSICIAN QUERY
"PT Name: Zeynep Hamm  MR #: [id number]    Physician Query Form - Heart  Condition Clarification     CDS/: Maddy Couch RN, CCDS              Contact information: [phone]  This form is a permanent document in the medical record.     Query Date: April 30, 2019    By submitting this query, we are merely seeking further clarification of documentation. Please utilize your independent clinical judgment when addressing the question(s) below.    The medical record contains the following   Indicators     Supporting Clinical Findings Location in Medical Record   x BNP BNP at 376 DC Summary 4/20   x EF EF of 45% DC Summary 4/20    Radiology findings     x Echo Results · Mildly decreased left ventricular systolic function. The estimated ejection fraction is 45%  · Grade I (mild) left ventricular diastolic dysfunction consistent with impaired relaxation. Echo 4/20    "Ascites" documented     x "SOB" or "GARCIA" documented  worsening SOB and GARCIA H&P 4/18   x "Hypoxia" documented Hypoxia  COPD exacerbation DC Summary 4/20   x Heart Failure documented New onset systolic congestive heart failure DC Summary 4/20   x "Edema" documented No LE edema H&P4/18   x Diuretics/Meds Lasix at 20mg daily PO DC Summary 4/20   x Treatment: Discussion with daughter did not want any aggressive measures.   DC Summary 4/20   x Other:  She is on PRN supplemental oxygen and for the past 2 days they have increased her oxygen use. She has been using at home albuterol with no relief. H&P 4/18   Heart failure (HF) can be acute, chronic or both. It is generally further specificed as systolic, diastolic, or combined. Lastly, it is important to identify an underlying etiology if known or suspected.     Common clues to acute exacerbation:  Rapidly progressive symptoms (w/in 2 weeks of presentation), using IV diuretics to treat, using supplemental O2, pulmonary edema on Xray, MI w/in 4 weeks, and/or BNP >500    Systolic Heart Failure: is defined as " chart documentation of a left ventricular ejection fraction (LVEF) less than 40%     Diastolic Heart Failure: is defined as a left ventricular ejection fraction (LVEF) greater than 40%   +      Evidence of diastolic dysfunction on echocardiography OR    Right heart catheterization wedge pressure above 12 mm Hg OR    Left heart catheterization left ventricular end diastolic pressure 18 mm Hg or above.    References: *American Heart Association    The clinical guidelines noted below are only system guidelines, and do not replace the providers clinical judgment.     Provider, please further clarify the diagnosis of new onset systolic congestive heart failure as:    [   x] Acute Systolic Heart Failure - New diagnosis.  EF < 50%  and acute HF symptoms documented    [   ] Acute on Chronic Systolic Heart Failure- Pre-existing systolic HF diagnosis.  EF < 40%  and acute HF symptoms documented    [   ] Chronic Systolic Heart Failure - Pre-existing systolic HF diagnosis.  EF < 40%  without  acute HF symptoms documented    [   ] Other Type of Heart Failure (please specify type): _________________________    [   ] Other (please specify): ___________________________________  [  ] Clinically Undetermined                          Please document in your progress notes daily for the duration of treatment until resolved and include in your discharge summary.

## 2019-04-30 NOTE — PLAN OF CARE
Problem: Fall Injury Risk  Goal: Absence of Fall and Fall-Related Injury    Intervention: Promote Injury-Free Environment     04/30/19 0320   Optimize Hector and Functional Mobility   Environmental Safety Modification assistive device/personal items within reach;clutter free environment maintained;lighting adjusted   Optimize Balance and Safe Activity   Safety Promotion/Fall Prevention Fall Risk reviewed with patient/family;medications reviewed;nonskid shoes/socks when out of bed;side rails raised x 2

## 2019-04-30 NOTE — CONSULTS
Consult Note    Consults  SUBJECTIVE:     History of Present Illness:  This 87-year-old female with a past medical history of Alzheimer dementia, CHF presented to the ED for progressively worsening weakness, poor appetite and jaundice for the last 2 weeks    Patient was recently admitted to the hospital on April 18th and was treated for COPD exacerbation and was found to have new onset CHF and discharged on Lasix.  However for the past 2 weeks patient continued to become more weak and was unable to ambulate at home.  At baseline patient was able to do her activities of daily living.  She also had a poor appetite but denied of any abdominal pain, nausea, vomiting, diarrhea, constipation, chest pain, fever, chills or headaches.  In the emergency room patient was found to be hypotensive and tachycardic with elevated white count of 19.02 and total bilirubin of 6.3, direct bilirubin of 3.7 and normal alkaline phosphatase.  Ultrasound of the abdomen revealed a 3.7 cm pancreatic head mass with pancreatic duct and intra biliary ductal dilatation.  A follow-up CT of the abdomen revealed large pancreatic head mass concerning for primary pancreatic malignancy.  There were innumerable large hypoattenuating liver lesions concerning for metastatic disease.     Her total bilirubin is worsening and a ERCP done today with metal stent was placed into the CBD.  EUS done and the tissue was obtained from the likely metastatic lesion in the left lobe of the liver      Review of patient's allergies indicates:   Allergen Reactions    Gluten protein      Past Medical History:   Diagnosis Date    Alzheimer disease     Bronchiectasis     Cataract     both eyes    Celiac disease     Emphysema of lung     Encounter for blood transfusion     Lung disease     Short-term memory loss      Past Surgical History:   Procedure Laterality Date    APPENDECTOMY      CATARACT EXTRACTION W/  INTRAOCULAR LENS IMPLANT Right 4/14/14    CATARACT  EXTRACTION W/  INTRAOCULAR LENS IMPLANT Left 14    EXCISION-MASS Left 10/31/2013    [facility] F[facility]    EXCISION-SOFT TISSUE - right shin calcium deposits Right 2015    [facility] at [facility] FLR    INSERTION, IOL PROSTHESIS Left 2014    [facility]    INSERTION, IOL PROSTHESIS Right 2014    [facility]    PHACOEMULSIFICATION, CATARACT Left 2014    [facility]    PHACOEMULSIFICATION, CATARACT Right 2014    [facility]    TONSILLECTOMY      uterine suspension       Family History   Problem Relation Age of Onset    Cancer Mother     Heart disease Sister     Glaucoma Sister     Cancer Father     COPD Brother     Melanoma Neg Hx     Macular degeneration Neg Hx     Diabetes Neg Hx     Stroke Neg Hx     Thyroid disease Neg Hx     Dementia Neg Hx      Social History     Tobacco Use    Smoking status: Former Smoker     Packs/day: 2.00     Years: 35.00     Pack years: 70.00     Types: Cigarettes     Last attempt to quit: 1988     Years since quittin.9    Smokeless tobacco: Never Used   Substance Use Topics    Alcohol use: No     Alcohol/week: 0.0 oz     Comment: twice a year on special occassions    Drug use: No     ROS  OBJECTIVE:     Vital Signs:  Temp:  [97.4 °F (36.3 °C)-97.9 °F (36.6 °C)]   Pulse:  [69-95]   Resp:  [16-18]   BP: (108-138)/(57-81)   SpO2:  [88 %-100 %]     Physical Exam  Laboratory:  CBC:   Recent Labs   Lab 19   WBC 16.49*   RBC 4.03   HGB 11.3*   HCT 34.6*      MCV 86   MCH 28.0   MCHC 32.7     CMP:   Recent Labs   Lab 19      CALCIUM 8.2*   ALBUMIN 1.9*   PROT 5.6*      K 3.9   CO2 22*      BUN 12   CREATININE 0.7   ALKPHOS 228*   ALT 51*   AST 79*   BILITOT 8.3*       Diagnostic Results:  CT abdomen and  pelvis with contrast  Large pancreatic head mass with central hypoattenuating component, possibly necrosis.  Findings concerning for primary pancreatic neoplasm.  Consistent with finding on ultrasound abdomen of today's date.    Filling defect in the main portal vein and SMV that may be thrombosis versus tumor invasion.    Innumerable large hypoattenuating liver lesions concerning for metastatic disease.    Several scattered subcentimeter pulmonary nodules, also concerning for metastatic disease.    Mildly dilated intrahepatic biliary ductal dilatation.  Prominent dilatation of the common bile duct that appears to be compressed distally by the pancreatic head mass.  Distended gallbladder containing a stone.    Consolidation at the left lung base and right middle lobe.  Minimal bilateral pleural fluid, left greater right.    Diverticulosis coli without evidence of diverticulitis.  Moderate stool burden    Upper EUS  - A single metastatic lesion was found in the left                         lobe of the liver. Tissue was obtained from this                         exam. The preliminary diagnosis is suggestive of                         metastatic pancreatic adenocarcinoma. Fine needle                         aspiration performed.                        - A mass was identified in the pancreatic head.     ERCP    - The major papilla appeared edematous.                        - A single severe biliary stricture was found in                         the lower third of the main bile duct. The                         stricture was malignant appearing. There was                         evidence of some duodenal compression involving the                         2nd/3rd portion of the duodenum, but no evidence                         for outlet obstruction at this time.                        - One uncovered metal stent was placed into the                         common bile duct.                        staging applies if  malignancy is confirmed.    ASSESSMENT/PLAN:     1. Large pancreatic head mass concerning for primary pancreatic neoplasm. Filling defect in the main portal vein and SMV that may be thrombosis versus tumor invasion.    2. Liver lesions:  Innumerable large hypoattenuating liver lesions concerning for metastatic disease.  3. Poor performance status  4. Alzheimer's dementia  5.  CHF    Plan:  1.  Await final pathology results  2.  In view of advanced stage, dementia and poor performance status, recommend to consult palliative Care for further evaluation and treat    Plan of care discussed with Dr. Michael Patel MD PGY 4  Hematology/oncology      I have reviewed the notes, assessments, and/or procedures performed by the housestaff, as above.  I have personally interviewed and examined the patient at the beside, and rounded with the housestaff. I concur with her/his assessment and plan and the documentation of Zeynep Hamm.  I, Dr. Damir Rodriguez, personally spent more than 70 mins during this encounter, greater than 50% was spent in direct counseling and/or coordination of care.     Damir Rodriguez M.D., M.S., F.A.C.P.  Hematology/Oncology Attending  [facility]

## 2019-04-30 NOTE — PLAN OF CARE
CM spoke with Patient's daughter regarding SNF choices. Daughter requested to meet with SW to further discuss facilities and receive list of available SNF's on [facility]. Encouraged daughter to go online to medicare.gov to research Nursing homes in her area where patient could receive SNF. Will continue to follow for discharge needs.

## 2019-04-30 NOTE — PT/OT/SLP PROGRESS
Physical Therapy      Patient Name:  Zeynep Hamm   MRN:  [id number]    Patient not seen today. Per chart review pt remains appropriate for acute therapy services at this time and will be tentatively scheduled for follow-up treatment on 5/1.  Will follow-up per POC as appropriate.     Pan Nichole, PTA

## 2019-04-30 NOTE — PLAN OF CARE
CM placed call to patient's daughter regarding SNF placement. Referral sent to [facility] on 4/29 no beds available at this time. Message left requesting additional choices from family. Awaiting call back.

## 2019-04-30 NOTE — PLAN OF CARE
Problem: Adult Inpatient Plan of Care  Goal: Plan of Care Review  Outcome: Ongoing (interventions implemented as appropriate)     04/30/19 4694   Plan of Care Review   Plan of Care Reviewed With patient   Progress improving     Pt alert only to self, no c/o pain or discomfort. daughter at bedside. V/s stable. Pt did not void this shift. Bladder scan showed 53 ml. md notified. No new orders. Pt tolerated full liquids diet advanced to regular. Will continue to monitor and interventions as appropriate.

## 2019-04-30 NOTE — SUBJECTIVE & OBJECTIVE
Past Medical History:   Diagnosis Date    Alzheimer disease     Bronchiectasis     Cataract     both eyes    Celiac disease     Emphysema of lung     Encounter for blood transfusion     Lung disease     Short-term memory loss        Past Surgical History:   Procedure Laterality Date    APPENDECTOMY      CATARACT EXTRACTION W/  INTRAOCULAR LENS IMPLANT Right 4/14/14    CATARACT EXTRACTION W/  INTRAOCULAR LENS IMPLANT Left 5/12/14    ERCP (ENDOSCOPIC RETROGRADE CHOLANGIOPANCREATOGRAPHY) N/A 4/29/2019    Performed by [facility]y [facility] at [facility] (2ND FLR)    EXCISION-MASS Left 10/31/2013    [facility]formed by SAL [facility] at [facility] 2ND FLR    EXCISION-SOFT TISSUE - right shin calcium deposits Right 8/19/2015    [facility]formed by SAL [facility] at [facility] 2ND FLR    INSERTION, IOL PROSTHESIS Left 5/12/2014    [facility]    INSERTION, IOL PROSTHESIS Right 4/14/2014    [facility]    PHACOEMULSIFICATION, CATARACT Left 5/12/2014    [facility]    PHACOEMULSIFICATION, CATARACT Right 4/14/2014    [facility]    TONSILLECTOMY      ULTRASOUND, UPPER GI TRACT, ENDOSCOPIC N/A 4/29/2019    Performed by [facility] at [facility] (2ND FLR)    uterine suspension         Review of patient's allergies indicates:   Allergen Reactions    Gluten protein        Medications:  Continuous Infusions:  Scheduled Meds:   ciprofloxacin HCl  500 mg Oral Q12H    enoxaparin  40 mg Subcutaneous Daily    fluticasone furoate-vilanterol  1 puff Inhalation Daily    metroNIDAZOLE  500 mg Oral Q8H    tiotropium  1 capsule Inhalation Daily     PRN Meds:acetaminophen, albuterol-ipratropium, dextrose 50%, dextrose 50%, glucagon (human recombinant), glucose, glucose, ondansetron, sodium chloride 0.9%    Family History     Problem Relation (Age of Onset)    COPD Brother    Cancer Mother, Father     Glaucoma Sister    Heart disease Sister        Tobacco Use    Smoking status: Former Smoker     Packs/day: 2.00     Years: 35.00     Pack years: 70.00     Types: Cigarettes     Last attempt to quit: 1988     Years since quittin.9    Smokeless tobacco: Never Used   Substance and Sexual Activity    Alcohol use: No     Alcohol/week: 0.0 oz     Comment: twice a year on special occassions    Drug use: No    Sexual activity: Not Currently     Partners: Male       Review of Systems   Constitutional: Positive for activity change, appetite change and fatigue.   Respiratory: Negative for cough and shortness of breath.    Cardiovascular: Negative for chest pain and leg swelling.   Gastrointestinal: Negative for abdominal distention, abdominal pain, nausea and vomiting.   Skin: Positive for color change and pallor.     Objective:     Vital Signs (Most Recent):  Temp: 97.6 °F (36.4 °C) (19 1131)  Pulse: 73 (19 1138)  Resp: 20 (19 1138)  BP: 112/73 (19 1131)  SpO2: 98 % (19 1138) Vital Signs (24h Range):  Temp:  [97.6 °F (36.4 °C)-97.8 °F (36.6 °C)] 97.6 °F (36.4 °C)  Pulse:  [] 73  Resp:  [16-20] 20  SpO2:  [92 %-99 %] 98 %  BP: (108-127)/(62-75) 112/73     Weight: 48.5 kg (107 lb)  Body mass index is 18.95 kg/m².    Review of Symptoms  Symptom Assessment (ESAS 0-10 scale)   ESAS 0 1 2 3 4 5 6 7 8 9 10   Pain x             Dyspnea x             Anxiety x             Nausea x             Depression               Anorexia    x          Fatigue       x       Insomnia x             Restlessness  x             Agitation x             CAM / Delirium : [place], Dementia - short term memory loss  Constipation     _x_ --  ___+   Diarrhea           _x_ --  ___+  Bowel Management Plan (BMP): No    Comments: Patient was able to report symptoms but rating was difficult    OME in 24 hours: 0    Performance Status: 40    ECOG Performance Status ndGndrndanddndend:nd nd2nd Physical Exam   Constitutional:  She appears well-developed. She appears ill.   Thin    HENT:   Head: Normocephalic and atraumatic.   Eyes: Scleral icterus is present.   Neck: Normal range of motion.   Cardiovascular: Intact distal pulses and normal pulses.  Occasional extrasystoles are present. Bradycardia present.   Pulmonary/Chest: Breath sounds normal.   Abdominal: Soft. Bowel sounds are normal. There is no tenderness. There is no guarding.   Neurological: She is alert.   Skin: Skin is warm and dry.   Psychiatric: Her behavior is normal. Thought content normal.       Significant Labs: All pertinent labs within the past 24 hours have been reviewed.  CBC:   Recent Labs   Lab 04/30/19 0530   WBC 13.53*   HGB 11.8*   HCT 34.7*   MCV 86        BMP:  Recent Labs   Lab 04/30/19 0530   GLU 91      K 4.1      CO2 20*   BUN 16   CREATININE 0.7   CALCIUM 8.2*     LFT:  Lab Results   Component Value Date    AST 71 (H) 04/30/2019    ALKPHOS 258 (H) 04/30/2019    BILITOT 6.2 (H) 04/30/2019     Albumin:   Albumin   Date Value Ref Range Status   04/30/2019 1.9 (L) 3.5 - 5.2 g/dL Final     Protein:   Total Protein   Date Value Ref Range Status   04/30/2019 5.6 (L) 6.0 - 8.4 g/dL Final     Lactic acid:   Lab Results   Component Value Date    LACTATE 1.7 04/26/2019    LACTATE 2.2 04/18/2019     Advance Care Planning   Advanced Directives::  Living Will: No  [place]: No  Do Not Resuscitate Status: Yes  Medical Power of : Yes./Not on chart Agent's Name:Ramon Torrez  Agent's Contact Number [phone]    Decision-Making Capacity: Patient answered questions, Family answered questions     Living Arrangements: Lives with her son but daughter is primary caregiver    Psychosocial/Cultural: Patient has short term memory loss.  She is  with 3 adult children who are actively involved in her care.  She lives with her son but her daughter is her main caregiver.  Her daughter is a retired RN.       Spiritual:     F- Donna and Belief:  Baptism    I - Importance: somewhat to patient.  More important to daughter  .  C - Community: not active    A - Address in Care:  visits

## 2019-04-30 NOTE — ASSESSMENT & PLAN NOTE
Impression: 87-year-old female with PMH of Alzheimer dementia, CHF with positive recent CT of the abdomen for large pancreatic head mass concerning for primary pancreatic malignancy and innumerable large liver lesions concerning for metastatic disease. Tissue was obtained for pathology from lesion in the left lobe of the liver.  Oncology consulted. Awaiting final pathology report.    Patient has declining performance status. Short term memory loss with intermitent confusion secondary to dementia.  Patient is aware of her basic clinical status but pts'dght is awaiting final pathology and / or more information from oncology to discuss thoroughly with her mother.  Patient's daughter has excellent understanding and insight concerning her mother's probable cancer and overall prognosis. Patient is home hospice and nursing home with hospice appropriate.      Recommendations/Plan  1) await patient's daughters discussion with oncology to finalize plan  2) probable home hospice vs nursing home with hospice care  3) Patient's daughter is researching and touring facilities  4) Will meet with patient and her daughter again tomorrow afternoon    Goals of Care  Family conference conducted by this APRN with the patient and the pt's daughter to discuss pt's current clinical status, goals of care, long term expected outcomes and poor prognosis. Patient is aware of her basic clinical status but is comfortable with me to speaking to her daughter.  Long discussion with patient's daughter who is has excellent understanding and insight concerning her mother's probable cancer and overall prognosis. She is awaiting final pathology and / or more information from oncology to discuss thoroughly with her mother. She stated that her mother has never wanted to in a facility but she feels this maybe the best first step.  She asked questions concerning hospice care at home and in the nursing home.  Questions were answered to her satisfaction.  [place] but the rest of her family lives her in [place] and is concerned about them having access to visit especially if her life expectancy is short. She knows her brother could not manage her mother at home at this point.    Patient's daughter is researching nursing homes and is going to tour some.  Advised her to look at the Medicare.gov site for ratings and to ask the individual nursing homes what hospices use their facilities.

## 2019-04-30 NOTE — PLAN OF CARE
SW spoke with Ramon and she said she did get pt's list of nursing home choices. She did not have any questions for the SW. She told the SW that she wants to speak with her mother's oncologist before she makes any SNF nsg home decisions.      Thao Grant, [facility]  [phone]

## 2019-04-30 NOTE — TREATMENT PLAN
AES Follow-up Note     Patient was seen and examined. Labs reviewed. Tbil down trending.   No abdominal discomfort. Tolerating diet.    Continues to be afebrile.   No sx/sx of post-ERCP pancreatitis.  AES will sign off. Please call if any questions/concerns.     Zo Torrez MD  Gastroenterology & Hepatology Fellow   Pager: [phone]

## 2019-04-30 NOTE — HPI
87-year-old female with PMH of Alzheimer dementia, CHF presented to the ED for progressively worsening weakness, poor appetite and jaundice for the last 2 weeks.  CT of the abdomen revealed large pancreatic head mass concerning for primary pancreatic malignancy.  There were innumerable large hypoattenuating liver lesions concerning for metastatic disease. Her total bilirubin is worsening and a ERCP done today with metal stent was placed into the CBD.  EUS done, tissue was obtained for pathology from lesion in the left lobe of the liver.  Oncology consulted. Awaiting final pathology report.  Oncology recommended palliative care consult.  Prior to this admission the patient with recent admit on April 18th for COPD exacerbation and was found to have new onset CHF and discharged on Lasix.  However for the past 2 weeks patient continued to become more weak and was unable to ambulate at home.  At baseline patient was able to do her activities of daily living.

## 2019-05-01 LAB
ALBUMIN SERPL BCP-MCNC: 1.8 G/DL (ref 3.5–5.2)
ALP SERPL-CCNC: 205 U/L (ref 55–135)
ALT SERPL W/O P-5'-P-CCNC: 41 U/L (ref 10–44)
ANION GAP SERPL CALC-SCNC: 7 MMOL/L (ref 8–16)
AST SERPL-CCNC: 56 U/L (ref 10–40)
BACTERIA BLD CULT: NORMAL
BACTERIA BLD CULT: NORMAL
BASOPHILS # BLD AUTO: 0.07 K/UL (ref 0–0.2)
BASOPHILS NFR BLD: 0.5 % (ref 0–1.9)
BILIRUB SERPL-MCNC: 3.6 MG/DL (ref 0.1–1)
BUN SERPL-MCNC: 14 MG/DL (ref 8–23)
CALCIUM SERPL-MCNC: 8.1 MG/DL (ref 8.7–10.5)
CHLORIDE SERPL-SCNC: 106 MMOL/L (ref 95–110)
CO2 SERPL-SCNC: 26 MMOL/L (ref 23–29)
CREAT SERPL-MCNC: 0.7 MG/DL (ref 0.5–1.4)
DIFFERENTIAL METHOD: ABNORMAL
EOSINOPHIL # BLD AUTO: 0.3 K/UL (ref 0–0.5)
EOSINOPHIL NFR BLD: 1.9 % (ref 0–8)
ERYTHROCYTE [DISTWIDTH] IN BLOOD BY AUTOMATED COUNT: 15.9 % (ref 11.5–14.5)
EST. GFR  (AFRICAN AMERICAN): >60 ML/MIN/1.73 M^2
EST. GFR  (NON AFRICAN AMERICAN): >60 ML/MIN/1.73 M^2
GLUCOSE SERPL-MCNC: 90 MG/DL (ref 70–110)
HCT VFR BLD AUTO: 32.3 % (ref 37–48.5)
HGB BLD-MCNC: 10.7 G/DL (ref 12–16)
IMM GRANULOCYTES # BLD AUTO: 0.16 K/UL (ref 0–0.04)
IMM GRANULOCYTES NFR BLD AUTO: 1.2 % (ref 0–0.5)
LYMPHOCYTES # BLD AUTO: 1.3 K/UL (ref 1–4.8)
LYMPHOCYTES NFR BLD: 9.4 % (ref 18–48)
MCH RBC QN AUTO: 28.3 PG (ref 27–31)
MCHC RBC AUTO-ENTMCNC: 33.1 G/DL (ref 32–36)
MCV RBC AUTO: 85 FL (ref 82–98)
MONOCYTES # BLD AUTO: 1.1 K/UL (ref 0.3–1)
MONOCYTES NFR BLD: 8.1 % (ref 4–15)
NEUTROPHILS # BLD AUTO: 10.9 K/UL (ref 1.8–7.7)
NEUTROPHILS NFR BLD: 78.9 % (ref 38–73)
NRBC BLD-RTO: 0 /100 WBC
PLATELET # BLD AUTO: 240 K/UL (ref 150–350)
PMV BLD AUTO: 11.2 FL (ref 9.2–12.9)
POTASSIUM SERPL-SCNC: 3.9 MMOL/L (ref 3.5–5.1)
PROT SERPL-MCNC: 5.4 G/DL (ref 6–8.4)
RBC # BLD AUTO: 3.78 M/UL (ref 4–5.4)
SODIUM SERPL-SCNC: 139 MMOL/L (ref 136–145)
WBC # BLD AUTO: 13.75 K/UL (ref 3.9–12.7)

## 2019-05-01 PROCEDURE — 97116 GAIT TRAINING THERAPY: CPT | Mod: HCNC

## 2019-05-01 PROCEDURE — 99232 SBSQ HOSP IP/OBS MODERATE 35: CPT | Mod: HCNC,GC,, | Performed by: INTERNAL MEDICINE

## 2019-05-01 PROCEDURE — 99233 SBSQ HOSP IP/OBS HIGH 50: CPT | Mod: HCNC,,, | Performed by: CLINICAL NURSE SPECIALIST

## 2019-05-01 PROCEDURE — 97530 THERAPEUTIC ACTIVITIES: CPT | Mod: HCNC

## 2019-05-01 PROCEDURE — 99233 PR SUBSEQUENT HOSPITAL CARE,LEVL III: ICD-10-PCS | Mod: HCNC,,, | Performed by: CLINICAL NURSE SPECIALIST

## 2019-05-01 PROCEDURE — 99232 PR SUBSEQUENT HOSPITAL CARE,LEVL II: ICD-10-PCS | Mod: HCNC,GC,, | Performed by: INTERNAL MEDICINE

## 2019-05-01 PROCEDURE — 25000003 PHARM REV CODE 250: Mod: HCNC | Performed by: STUDENT IN AN ORGANIZED HEALTH CARE EDUCATION/TRAINING PROGRAM

## 2019-05-01 PROCEDURE — 25000242 PHARM REV CODE 250 ALT 637 W/ HCPCS: Mod: HCNC | Performed by: STUDENT IN AN ORGANIZED HEALTH CARE EDUCATION/TRAINING PROGRAM

## 2019-05-01 PROCEDURE — 11000001 HC ACUTE MED/SURG PRIVATE ROOM: Mod: HCNC

## 2019-05-01 PROCEDURE — 80053 COMPREHEN METABOLIC PANEL: CPT | Mod: HCNC

## 2019-05-01 PROCEDURE — 94761 N-INVAS EAR/PLS OXIMETRY MLT: CPT | Mod: HCNC

## 2019-05-01 PROCEDURE — 36415 COLL VENOUS BLD VENIPUNCTURE: CPT | Mod: HCNC

## 2019-05-01 PROCEDURE — 85025 COMPLETE CBC W/AUTO DIFF WBC: CPT | Mod: HCNC

## 2019-05-01 RX ADMIN — FLUTICASONE FUROATE AND VILANTEROL TRIFENATATE 1 PUFF: 100; 25 POWDER RESPIRATORY (INHALATION) at 09:05

## 2019-05-01 RX ADMIN — METRONIDAZOLE 500 MG: 500 TABLET ORAL at 01:05

## 2019-05-01 RX ADMIN — CIPROFLOXACIN HYDROCHLORIDE 500 MG: 500 TABLET, FILM COATED ORAL at 09:05

## 2019-05-01 RX ADMIN — TIOTROPIUM BROMIDE 18 MCG: 18 CAPSULE ORAL; RESPIRATORY (INHALATION) at 09:05

## 2019-05-01 RX ADMIN — METRONIDAZOLE 500 MG: 500 TABLET ORAL at 05:05

## 2019-05-01 RX ADMIN — SODIUM CHLORIDE, SODIUM LACTATE, POTASSIUM CHLORIDE, AND CALCIUM CHLORIDE 1000 ML: .6; .31; .03; .02 INJECTION, SOLUTION INTRAVENOUS at 01:05

## 2019-05-01 RX ADMIN — METRONIDAZOLE 500 MG: 500 TABLET ORAL at 09:05

## 2019-05-01 NOTE — PROGRESS NOTES
[facility] Hospital Medicine  Progress Note    Patient Name: Zeynep Hamm  MRN: [id number]  Patient Class: IP- Inpatient   Admission Date: 4/26/2019  Length of Stay: 5 days  Attending Physician: Michelle Nunez MD  Primary Care Provider: Maya Kelley MD    [facility] Medicine Team: [facility]OSP MED 5 Anitra Michaud MD    Subjective:     Principal Problem:Neoplasm of head of pancreas    HPI:  87 years old female with past medical history of Alzheimer, COPD, Cataract, Celiac disease, Diastolic HF (EF 45%) presented to ED with fatigue.  Per patient daughter, she has been increasingly getting weaker over the last 3 weeks to a point where she is unable to stand up today and that's when the daughter decided to bring the patient to ED, at baseline patient able to walk short distance, she also has been sleeping more, eat less than usual with gradual wieght loss over coupleof weeks, noticed to have yellowish skin discoloration today, otherwise has no fever, chills, chest pain, shortness of breath, abdominal pain, nausea, vomiting, diarrhea and urinary symptoms. Patient quit smoking 35 years ago, doesn't drink alcohol, no recent surgeries or travel, has family history of leukemia and mediastinal mass.She was admitted to the hospital recently on 4/18/2019 with COPD exacerbation and new onset systolic congestive heart failure, started on lasix.  At ED patient patient was hypotensive and tachycardic, labs significant for elevated WBC, LFTs and bilirubin, U/S abdomin showed head of pancrease mass with extra and intrahepatic biliary ductal dilatation and hepatic lesions.        Hospital Course:  Admitted for suspect cholangitis. ERCP done with liver met cytology pending.      Interval History: Doing well overnight. Transitioned to PO abx yesterday. Bp remains low. Given LR bolus. She denies appetite.     Review of Systems   Constitutional: Positive for diaphoresis. Negative for chills and fever.   Respiratory:  Negative for cough.    Cardiovascular: Negative for chest pain and palpitations.   Gastrointestinal: Negative for abdominal pain.   Genitourinary: Negative for dysuria.     Objective:     Vital Signs (Most Recent):  Temp: 96.2 °F (35.7 °C) (05/01/19 1655)  Pulse: 76 (05/01/19 1655)  Resp: 20 (05/01/19 1655)  BP: 109/61 (05/01/19 1655)  SpO2: (!) 94 % (05/01/19 1655) Vital Signs (24h Range):  Temp:  [96.2 °F (35.7 °C)-97.6 °F (36.4 °C)] 96.2 °F (35.7 °C)  Pulse:  [67-88] 76  Resp:  [14-20] 20  SpO2:  [94 %-99 %] 94 %  BP: ()/(52-61) 109/61     Weight: 48.5 kg (107 lb)  Body mass index is 18.95 kg/m².  No intake or output data in the 24 hours ending 05/01/19 1723   Physical Exam   Constitutional: She appears well-developed. No distress.   thin   HENT:   Head: Normocephalic and atraumatic.   Eyes: Pupils are equal, round, and reactive to light.   Neck: Neck supple.   Cardiovascular: Normal rate, regular rhythm and normal heart sounds.   Pulmonary/Chest: Effort normal and breath sounds normal. No respiratory distress.   Abdominal: Soft. Bowel sounds are normal. She exhibits no distension. There is tenderness (Very mild, RUQ).   Musculoskeletal: She exhibits no edema or tenderness.   Lymphadenopathy:     She has no cervical adenopathy.   Neurological: She is alert.   disoriented to time and situation, but alert to place.   Pleasant    Skin: She is not diaphoretic. No pallor.   slightly jaundiced   Psychiatric: She has a normal mood and affect.   Vitals reviewed.      Significant Labs: All pertinent labs within the past 24 hours have been reviewed.    Significant Imaging: I have reviewed all pertinent imaging results/findings within the past 24 hours.    Assessment/Plan:      * Neoplasm of head of pancreas  Newly diagnosed, with what is likely mets to the liver  - Plan to discuss with daughter who per patient is POA   - heme/onc following, appreciate assistance  -daughter would like to pursue hospice  options      Acute cholangitis   S/p ERCP  -transitioned from zosyn to cipro & flagyl 4-7 days      DNR (do not resuscitate)        Palliative care encounter  Appreciate assistance    Metastasis to liver  S/p bx pending cytology results    Pancreatic mass  CT abdomen & U/s show   - A mass was identified in the pancreatic head. This was staged T3 Nx M1 (based on metastasis to liver and pending cytologic confirmation). The staging applies if malignancy is confirmed.  -onc consulted, appreciate recs & planning outpatient management  CA 19-9 >60K     Altered mental status, unspecified  Daughter provides most of information    Biliary obstruction  T bili trendign down    Goals of care, counseling/discussion  Made DNR, Brief goals discuss.  Expect discharge to hospice         COPD (chronic obstructive pulmonary disease)  Stable Patient on Combo ICS LABA and LABA LAMA outpatient   - continue ICS, LABA, Added LAMA (spiriva),   - breathing treatment as needed        Congestive heart failure (CHF)  Borderline EF with signs of diastolic dysfunction  - Holding BB given severe COPD  - BP control to help reduce risk of flash Pulm edema.      Celiac disease  Stable no active issue     Plan:   - gluten free diet   -nutrition consult given history of celiac disease and better sources of gluten free options        Dementia in Alzheimer's disease  Patient mental status at baseline    Plan:  - Delirium precautions  - PT/OT             VTE Risk Mitigation (From admission, onward)        Ordered     enoxaparin injection 40 mg  Daily      04/26/19 1926     IP VTE HIGH RISK PATIENT  Once      04/26/19 1926     Place CAT hose  Until discontinued      04/26/19 1845     Place sequential compression device  Until discontinued      04/26/19 1845              Anitra Michaud MD  Department of Hospital Medicine   [facility]

## 2019-05-01 NOTE — PROGRESS NOTES
[facility]  Palliative Medicine  Progress Note    Patient Name: Zeynep Hamm  MRN: [id number]  Admission Date: 4/26/2019  Hospital Length of Stay: 5 days  Code Status: DNR   Attending Provider: Michelle Nunez MD  Consulting Provider: DEANDRE Manzanares, CNS  Primary Care Physician: Maya Kelley MD  Principal Problem:Neoplasm of head of pancreas    Patient information was obtained from patient, relative(s) and past medical records.      Assessment/Plan:     Palliative care encounter  Impression: 87-year-old female with PMH of Alzheimer dementia, CHF with positive recent CT of the abdomen for large pancreatic head mass concerning for primary pancreatic malignancy and innumerable large liver lesions concerning for metastatic disease. Tissue was obtained for pathology from lesion in the left lobe of the liver.  Oncology consulted. Awaiting final pathology report.    Patient has declining performance status. Short term memory loss with intermitent confusion secondary to dementia.  Patient is aware of her basic clinical status but pts'dght is awaiting final pathology and / or more information from oncology to discuss thoroughly with her mother.  Patient's daughter has excellent understanding and insight.  Patient's daughter has decided to do SNF in nursing home and [facility] her provider.  Patients daughter states she wants her mother to have the opportunity to have some time to strengthen herself prior to returning to her home so that her brother may have easier time caring for her.     Recommendations/Plan  1) Nursing Home SNF -  Pt's daughter has chosen [facility]  2) Care management aware and coordinating discharge plan   3) Consider possible home hospice or nursing home with hospice if patient declines  at SNF.      Goals of Care  Follow-up discussion conducted by this APRN with the pt's daughter to discuss pt's current clinical status, and goals of care.  She stated  that her mother has never wanted to in a facility but she feels this maybe the best first step.  [place] of her family lives her in [place] and is concerned about them having access to visit her mother.  She knows her brother could not manage her mother at home at this point.  So she feels SNF nursing home for strengthening is the best option for her mother at this time so that she may return to her home           I will follow-up with patient. Please contact us if you have any additional questions.    Subjective:     Chief Complaint:   Chief Complaint   Patient presents with    Fatigue     generalized weakness with not eating since hospital discharge last wee.        HPI:   87-year-old female with PMH of Alzheimer dementia, CHF presented to the ED for progressively worsening weakness, poor appetite and jaundice for the last 2 weeks.  CT of the abdomen revealed large pancreatic head mass concerning for primary pancreatic malignancy.  There were innumerable large hypoattenuating liver lesions concerning for metastatic disease. Her total bilirubin is worsening and a ERCP done today with metal stent was placed into the CBD.  EUS done, tissue was obtained for pathology from lesion in the left lobe of the liver.  Oncology consulted. Awaiting final pathology report.  Oncology recommended palliative care consult.  Prior to this admission the patient with recent admit on April 18th for COPD exacerbation and was found to have new onset CHF and discharged on Lasix.  However for the past 2 weeks patient continued to become more weak and was unable to ambulate at home.  At baseline patient was able to do her activities of daily living.          Hospital Course:  No notes on file      Past Medical History:   Diagnosis Date    Alzheimer disease     Bronchiectasis     Cataract     both eyes    Celiac disease     Emphysema of lung     Encounter for blood transfusion     Lung  disease     Short-term memory loss        Past Surgical History:   Procedure Laterality Date    APPENDECTOMY      CATARACT EXTRACTION W/  INTRAOCULAR LENS IMPLANT Right 4/14/14    CATARACT EXTRACTION W/  INTRAOCULAR LENS IMPLANT Left 5/12/14    ERCP (ENDOSCOPIC RETROGRADE CHOLANGIOPANCREATOGRAPHY) N/A 4/29/2019    Performed by Emery Flores MD at [facility] ENDO (2ND FLR)    EXCISION-MASS Left 10/31/2013    Performed by SAL Morgan III, MD at [facility] OR 2ND FLR    EXCISION-SOFT TISSUE - right shin calcium deposits Right 8/19/2015    Performed by SAL Morgan III, MD at [facility] OR 2ND FLR    INSERTION, IOL PROSTHESIS Left 5/12/2014    Performed by [facility] at [facility] OR    INSERTION, IOL PROSTHESIS Right 4/14/2014    [facility]formed by [facility] at [facility]    PHACOEMULSIFICATION, CATARACT Left 5/12/2014    Performed by [facility] at [facility]    PHACOEMULSIFICATION, CATARACT Right 4/14/2014    Performed by [facility] at [facility]    TONSILLECTOMY      ULTRASOUND, UPPER GI TRACT, ENDOSCOPIC N/A 4/29/2019    Performed by Emery [facility] at [facility] ENDO (2ND FLR)    uterine suspension         Review of patient's allergies indicates:   Allergen Reactions    Gluten protein        Medications:  Continuous Infusions:  Scheduled Meds:   ciprofloxacin HCl  500 mg Oral Q12H    enoxaparin  40 mg Subcutaneous Daily    fluticasone furoate-vilanterol  1 puff Inhalation Daily    metroNIDAZOLE  500 mg Oral Q8H    tiotropium  1 capsule Inhalation Daily     PRN Meds:acetaminophen, albuterol-ipratropium, dextrose 50%, dextrose 50%, glucagon (human recombinant), glucose, glucose, ondansetron, sodium chloride 0.9%    Family History     Problem Relation (Age of Onset)    COPD Brother    Cancer Mother, Father    Glaucoma Sister    Heart disease Sister        Tobacco Use    Smoking status: Former Smoker     Packs/day: 2.00     Years: 35.00     Pack years: 70.00     Types: Cigarettes     Last attempt to  quit: 1988     Years since quittin.9    Smokeless tobacco: Never Used   Substance and Sexual Activity    Alcohol use: No     Alcohol/week: 0.0 oz     Comment: twice a year on special occassions    Drug use: No    Sexual activity: Not Currently     Partners: Male       Review of Systems   Constitutional: Positive for activity change, appetite change and fatigue.   Respiratory: Negative for cough and shortness of breath.    Cardiovascular: Negative for chest pain and leg swelling.   Gastrointestinal: Negative for abdominal distention, abdominal pain, nausea and vomiting.   Skin: Positive for color change and pallor.     Objective:     Vital Signs (Most Recent):  Temp: 96.7 °F (35.9 °C) (19 1124)  Pulse: 77 (19 1124)  Resp: 20 (19 112)  BP: (!) 91/52 (19)  SpO2: 98 % (19) Vital Signs (24h Range):  Temp:  [96.7 °F (35.9 °C)-97.6 °F (36.4 °C)] 96.7 °F (35.9 °C)  Pulse:  [67-88] 77  Resp:  [14-20] 20  SpO2:  [96 %-99 %] 98 %  BP: ()/(52-56) 91/52     Weight: 48.5 kg (107 lb)  Body mass index is 18.95 kg/m².    Review of Symptoms  Symptom Assessment (ESAS 0-10 scale)   ESAS 0 1 2 3 4 5 6 7 8 9 10   Pain x             Dyspnea x             Anxiety x             Nausea x             Depression               Anorexia    x          Fatigue       x       Insomnia x             Restlessness  x             Agitation x             CAM / Delirium : [place], Dementia - short term memory loss  Constipation     _x_ --  ___+   Diarrhea           _x_ --  ___+  Bowel Management Plan (BMP): No    Comments: Patient was able to report symptoms but rating was difficult    OME in 24 hours: 0    Performance Status: 40    ECOG Performance Status thGthrthathdtheth:th th4th Physical Exam   Constitutional: She appears well-developed. She appears ill.   Thin    HENT:   Head: Normocephalic and atraumatic.   Eyes: Scleral icterus is present.   Neck: Normal range of motion.   Cardiovascular: Intact  distal pulses and normal pulses.  Occasional extrasystoles are present. Bradycardia present.   Pulmonary/Chest: Breath sounds normal.   Abdominal: Soft. Bowel sounds are normal. There is no tenderness. There is no guarding.   Neurological: She is alert.   Skin: Skin is warm and dry.   Psychiatric: Her behavior is normal. Thought content normal.     CBC:   Recent Labs   Lab 05/01/19  0533   WBC 13.75*   HGB 10.7*   HCT 32.3*   MCV 85        BMP:  Recent Labs   Lab 05/01/19  0533   GLU 90      K 3.9      CO2 26   BUN 14   CREATININE 0.7   CALCIUM 8.1*     LFT:  Lab Results   Component Value Date    AST 56 (H) 05/01/2019    ALKPHOS 205 (H) 05/01/2019    BILITOT 3.6 (H) 05/01/2019     Albumin:   Albumin   Date Value Ref Range Status   05/01/2019 1.8 (L) 3.5 - 5.2 g/dL Final     Protein:   Total Protein   Date Value Ref Range Status   05/01/2019 5.4 (L) 6.0 - 8.4 g/dL Final     Lactic acid:   Lab Results   Component Value Date    LACTATE 1.7 04/26/2019    LACTATE 2.2 04/18/2019     Advance Care Planning   Advanced Directives::  Living Will: No  LaPOST: No  Do Not Resuscitate Status: Yes  Medical Power of : Yes./Not on chart Agent's Name:Ramon Torrez  Agent's Contact Number [phone]    Decision-Making Capacity: Patient answered questions, Family answered questions     Living Arrangements: Lives with her son but daughter is primary caregiver    Psychosocial/Cultural: Patient has short term memory loss.  She is  with 3 adult children who are actively involved in her care.  She lives with her son but her daughter is her main caregiver.  Her daughter is a retired RN.       Spiritual:     F- Donna and Belief: Adventist    I - Importance: somewhat to patient.  More important to daughter  .  C - Community: not active    A - Address in Care:  visits      > 50% of 35 min visit spent in chart review, face to face discussion of goals of care,  symptom assessment, coordination of care and  emotional support.    DEANDRE Manzanares, CNS  Palliative Medicine  [facility]

## 2019-05-01 NOTE — SUBJECTIVE & OBJECTIVE
Interval History: Doing well overnight. Transitioning to PO abx today. She refused physical exam today but otherwise has no complaints.       Review of Systems   Constitutional: Positive for diaphoresis. Negative for chills and fever.   Respiratory: Negative for cough.    Cardiovascular: Negative for chest pain and palpitations.   Gastrointestinal: Negative for abdominal pain.   Genitourinary: Negative for dysuria.     Objective:     Vital Signs (Most Recent):  Temp: 97.6 °F (36.4 °C) (04/30/19 2042)  Pulse: 88 (04/30/19 2042)  Resp: 16 (04/30/19 2042)  BP: (!) 97/52 (04/30/19 2042)  SpO2: 96 % (04/30/19 2042) Vital Signs (24h Range):  Temp:  [96.3 °F (35.7 °C)-97.8 °F (36.6 °C)] 97.6 °F (36.4 °C)  Pulse:  [] 88  Resp:  [16-20] 16  SpO2:  [93 %-99 %] 96 %  BP: ()/(52-73) 97/52     Weight: 48.5 kg (107 lb)  Body mass index is 18.95 kg/m².    Intake/Output Summary (Last 24 hours) at 4/30/2019 2120  Last data filed at 4/30/2019 1500  Gross per 24 hour   Intake 240 ml   Output --   Net 240 ml      Physical Exam   Constitutional: She appears well-developed. No distress.   thin   HENT:   Head: Normocephalic and atraumatic.   Eyes: Pupils are equal, round, and reactive to light.   Neck: Neck supple.   Cardiovascular: Normal rate, regular rhythm and normal heart sounds.   Pulmonary/Chest: Effort normal and breath sounds normal. No respiratory distress.   Abdominal: Soft. Bowel sounds are normal. She exhibits no distension. There is tenderness (Very mild, RUQ).   Musculoskeletal: She exhibits no edema or tenderness.   Lymphadenopathy:     She has no cervical adenopathy.   Neurological: She is alert.   disoriented to time and situation, but alert to place.   Pleasant    Skin: She is not diaphoretic. No pallor.   slightly jaundiced   Psychiatric: She has a normal mood and affect.   Vitals reviewed.      Significant Labs: All pertinent labs within the past 24 hours have been reviewed.    Significant Imaging: I have  reviewed all pertinent imaging results/findings within the past 24 hours.

## 2019-05-01 NOTE — PLAN OF CARE
Problem: Physical Therapy Goal  Goal: Physical Therapy Goal  Goals to be met by: 19     Patient will increase functional independence with mobility by performin. Sit to stand transfer with Supervision  2. Bed to chair transfer with Supervision using appropriate AD  3. Gait  x 200 feet with Stand-by Assistance using appropriate AD.   4. Ascend/Descend 6 inch curb step with Contact Guard Assistance using appropriate AD.     Outcome: Ongoing (interventions implemented as appropriate)  Goals remain appropriate.

## 2019-05-01 NOTE — ASSESSMENT & PLAN NOTE
Stable no active issue     Plan:   - gluten free diet   -nutrition consult given history of celiac disease and better sources of gluten free options

## 2019-05-01 NOTE — ASSESSMENT & PLAN NOTE
Impression: 87-year-old female with PMH of Alzheimer dementia, CHF with positive recent CT of the abdomen for large pancreatic head mass concerning for primary pancreatic malignancy and innumerable large liver lesions concerning for metastatic disease. Tissue was obtained for pathology from lesion in the left lobe of the liver.  Oncology consulted. Awaiting final pathology report.    Patient has declining performance status. Short term memory loss with intermitent confusion secondary to dementia.  Patient is aware of her basic clinical status but pts'dght is awaiting final pathology and / or more information from oncology to discuss thoroughly with her mother.  Patient's daughter has excellent understanding and insight.  Patient's daughter has decided to do SNF in nursing home and [facility] her provider.  Patients daughter states she wants her mother to have the opportunity to have some time to strengthen herself prior to returning to her home so that her brother may have easier time caring for her.     Recommendations/Plan  1) Nursing Home SNF -  Pt's daughter has chosen [facility]  2) Care management aware and coordinating discharge plan   3) Consider possible home hospice or nursing home with hospice if patient declines  at SNF.      Goals of Care  Follow-up discussion conducted by this APRN with the pt's daughter to discuss pt's current clinical status, and goals of care.  She stated that her mother has never wanted to in a facility but she feels this maybe the best first step.  [place] of her family lives her in [place] and is concerned about them having access to visit her mother.  She knows her brother could not manage her mother at home at this point.  So she feels SNF nursing home for strengthening is the best option for her mother at this time so that she may return to her home

## 2019-05-01 NOTE — ASSESSMENT & PLAN NOTE
Newly diagnosed, with what is likely mets to the liver  - Plan to discuss with daughter who per patient is POA   - heme/onc following, appreciate assistance  -daughter would like to pursue hospice options

## 2019-05-01 NOTE — PT/OT/SLP PROGRESS
"Physical Therapy Treatment    Patient Name:  Zeynep Hamm   MRN:  [id number]    Recommendations:     Discharge Recommendations:  nursing facility, skilled   Discharge Equipment Recommendations: walker, rolling   Barriers to discharge: None    Assessment:     Zeynep Hamm is a 87 y.o. female admitted with a medical diagnosis of Neoplasm of head of pancreas.  She presents with the following impairments/functional limitations:  weakness, impaired endurance, impaired self care skills, impaired functional mobilty, gait instability, impaired balance, decreased safety awareness, impaired cognition, pain, impaired joint extensibility. Pt tolerated session well with focus on bed mobility, transfers, and gait training. Pt with no progression and is not motivated to participate d/t impaired cognition. Pt will continue to benefit from therapy services to improve impairments listed above.    Rehab Prognosis: Good; patient would benefit from acute skilled PT services to address these deficits and reach maximum level of function.    Recent Surgery: Procedure(s) (LRB):  ERCP (ENDOSCOPIC RETROGRADE CHOLANGIOPANCREATOGRAPHY) (N/A)  ULTRASOUND, UPPER GI TRACT, ENDOSCOPIC (N/A) 2 Days Post-Op    Plan:     During this hospitalization, patient to be seen 3 x/week to address the identified rehab impairments via gait training, therapeutic activities, therapeutic exercises and progress toward the following goals:    · Plan of Care Expires:  05/28/19    Subjective     Chief Complaint: no c/o  Patient/Family Comments/goals: "I   Pain/Comfort:  · Pain Rating 1: 0/10  · Pain Rating Post-Intervention 1: 0/10      Objective:     Communicated with NSG prior to session.  Patient found supine with oxygen upon PTA entry to room.     General Precautions: Standard, fall   Orthopedic Precautions:N/A   Braces: N/A     Functional Mobility:  · Bed Mobility:     · Scooting: stand by assistance  · Supine to Sit: stand by assistance  · Transfers:   "   · Sit to Stand:  contact guard assistance with rolling walker  · Bed to Chair: contact guard assistance with  rolling walker  using  Step Transfer  · Gait: Pt ambulates 76 ft with RW and CGA/Min A. Pt with FFP, downward gaze, poor RW mgmt and poor safety awarenes.       AM-PAC 6 CLICK MOBILITY  Turning over in bed (including adjusting bedclothes, sheets and blankets)?: 4  Sitting down on and standing up from a chair with arms (e.g., wheelchair, bedside commode, etc.): 3  Moving from lying on back to sitting on the side of the bed?: 4  Moving to and from a bed to a chair (including a wheelchair)?: 3  Need to walk in hospital room?: 3  Climbing 3-5 steps with a railing?: 3  Basic Mobility Total Score: 20       Therapeutic Activities and Exercises:   Pt assisted with functional mobility as noted above.     Patient left up in chair with all lines intact and call button in reach..    GOALS:   Multidisciplinary Problems     Physical Therapy Goals        Problem: Physical Therapy Goal    Goal Priority Disciplines Outcome Goal Variances Interventions   Physical Therapy Goal     PT, PT/OT Ongoing (interventions implemented as appropriate)     Description:  Goals to be met by: 19     Patient will increase functional independence with mobility by performin. Sit to stand transfer with Supervision  2. Bed to chair transfer with Supervision using appropriate AD  3. Gait  x 200 feet with Stand-by Assistance using appropriate AD.   4. Ascend/Descend 6 inch curb step with Contact Guard Assistance using appropriate AD.                      Time Tracking:     PT Received On: 19  PT Start Time: 1011     PT Stop Time: 1035  PT Total Time (min): 24 min     Billable Minutes: Gait Training 12 and Therapeutic Activity 12    Treatment Type: Treatment  PT/PTA: PTA     PTA Visit Number: 1     Pan Nichole PTA  2019

## 2019-05-01 NOTE — SUBJECTIVE & OBJECTIVE
Interval History: Doing well overnight. Transitioned to PO abx yesterday. Bp remains low. Given LR bolus. She denies appetite.     Review of Systems   Constitutional: Positive for diaphoresis. Negative for chills and fever.   Respiratory: Negative for cough.    Cardiovascular: Negative for chest pain and palpitations.   Gastrointestinal: Negative for abdominal pain.   Genitourinary: Negative for dysuria.     Objective:     Vital Signs (Most Recent):  Temp: 96.2 °F (35.7 °C) (05/01/19 1655)  Pulse: 76 (05/01/19 1655)  Resp: 20 (05/01/19 1655)  BP: 109/61 (05/01/19 1655)  SpO2: (!) 94 % (05/01/19 1655) Vital Signs (24h Range):  Temp:  [96.2 °F (35.7 °C)-97.6 °F (36.4 °C)] 96.2 °F (35.7 °C)  Pulse:  [67-88] 76  Resp:  [14-20] 20  SpO2:  [94 %-99 %] 94 %  BP: ()/(52-61) 109/61     Weight: 48.5 kg (107 lb)  Body mass index is 18.95 kg/m².  No intake or output data in the 24 hours ending 05/01/19 1723   Physical Exam   Constitutional: She appears well-developed. No distress.   thin   HENT:   Head: Normocephalic and atraumatic.   Eyes: Pupils are equal, round, and reactive to light.   Neck: Neck supple.   Cardiovascular: Normal rate, regular rhythm and normal heart sounds.   Pulmonary/Chest: Effort normal and breath sounds normal. No respiratory distress.   Abdominal: Soft. Bowel sounds are normal. She exhibits no distension. There is tenderness (Very mild, RUQ).   Musculoskeletal: She exhibits no edema or tenderness.   Lymphadenopathy:     She has no cervical adenopathy.   Neurological: She is alert.   disoriented to time and situation, but alert to place.   Pleasant    Skin: She is not diaphoretic. No pallor.   slightly jaundiced   Psychiatric: She has a normal mood and affect.   Vitals reviewed.      Significant Labs: All pertinent labs within the past 24 hours have been reviewed.    Significant Imaging: I have reviewed all pertinent imaging results/findings within the past 24 hours.

## 2019-05-01 NOTE — SUBJECTIVE & OBJECTIVE
Past Medical History:   Diagnosis Date    Alzheimer disease     Bronchiectasis     Cataract     both eyes    Celiac disease     Emphysema of lung     Encounter for blood transfusion     Lung disease     Short-term memory loss        Past Surgical History:   Procedure Laterality Date    APPENDECTOMY      CATARACT EXTRACTION W/  INTRAOCULAR LENS IMPLANT Right 4/14/14    CATARACT EXTRACTION W/  INTRAOCULAR LENS IMPLANT Left 5/12/14    ERCP (ENDOSCOPIC RETROGRADE CHOLANGIOPANCREATOGRAPHY) N/A 4/29/2019    Performed by Emery Flores MD at [facility] (2ND FLR)    EXCISION-MASS Left 10/31/2013    Performed by SAL Morgan III, MD at [facility] OR 2ND FLR    EXCISION-SOFT TISSUE - right shin calcium deposits Right 8/19/2015    Performed by SAL Morgan III, MD at [facility] 2ND FLR    INSERTION, IOL PROSTHESIS Left 5/12/2014    [facility]    INSERTION, IOL PROSTHESIS Right 4/14/2014    [facility] at [facility]    PHACOEMULSIFICATION, CATARACT Left 5/12/2014    Per[facility] at [facility]    PHACOEMULSIFICATION, CATARACT Right 4/14/2014    [facility] at [facility]    TONSILLECTOMY      ULTRASOUND, UPPER GI TRACT, ENDOSCOPIC N/A 4/29/2019    Performed by Emery Flores [facility] at [facility] (2ND FLR)    uterine suspension         Review of patient's allergies indicates:   Allergen Reactions    Gluten protein        Medications:  Continuous Infusions:  Scheduled Meds:   ciprofloxacin HCl  500 mg Oral Q12H    enoxaparin  40 mg Subcutaneous Daily    fluticasone furoate-vilanterol  1 puff Inhalation Daily    metroNIDAZOLE  500 mg Oral Q8H    tiotropium  1 capsule Inhalation Daily     PRN Meds:acetaminophen, albuterol-ipratropium, dextrose 50%, dextrose 50%, glucagon (human recombinant), glucose, glucose, ondansetron, sodium chloride 0.9%    Family History     Problem Relation (Age of Onset)    COPD Brother    Cancer Mother, Father     Glaucoma Sister    Heart disease Sister        Tobacco Use    Smoking status: Former Smoker     Packs/day: 2.00     Years: 35.00     Pack years: 70.00     Types: Cigarettes     Last attempt to quit: 1988     Years since quittin.9    Smokeless tobacco: Never Used   Substance and Sexual Activity    Alcohol use: No     Alcohol/week: 0.0 oz     Comment: twice a year on special occassions    Drug use: No    Sexual activity: Not Currently     Partners: Male       Review of Systems   Constitutional: Positive for activity change, appetite change and fatigue.   Respiratory: Negative for cough and shortness of breath.    Cardiovascular: Negative for chest pain and leg swelling.   Gastrointestinal: Negative for abdominal distention, abdominal pain, nausea and vomiting.   Skin: Positive for color change and pallor.     Objective:     Vital Signs (Most Recent):  Temp: 96.7 °F (35.9 °C) (19 1124)  Pulse: 77 (19 1124)  Resp: 20 (19 1124)  BP: (!) 91/52 (19 1124)  SpO2: 98 % (19) Vital Signs (24h Range):  Temp:  [96.7 °F (35.9 °C)-97.6 °F (36.4 °C)] 96.7 °F (35.9 °C)  Pulse:  [67-88] 77  Resp:  [14-20] 20  SpO2:  [96 %-99 %] 98 %  BP: ()/(52-56) 91/52     Weight: 48.5 kg (107 lb)  Body mass index is 18.95 kg/m².    Review of Symptoms  Symptom Assessment (ESAS 0-10 scale)   ESAS 0 1 2 3 4 5 6 7 8 9 10   Pain x             Dyspnea x             Anxiety x             Nausea x             Depression               Anorexia    x          Fatigue       x       Insomnia x             Restlessness  x             Agitation x             CAM / Delirium : Alzhiemers, Dementia - short term memory loss  Constipation     _x_ --  ___+   Diarrhea           _x_ --  ___+  Bowel Management Plan (BMP): No    Comments: Patient was able to report symptoms but rating was difficult    OME in 24 hours: 0    Performance Status: 40    ECOG Performance Status thGthrthathdtheth:th th4th Physical Exam   Constitutional:  She appears well-developed. She appears ill.   Thin    HENT:   Head: Normocephalic and atraumatic.   Eyes: Scleral icterus is present.   Neck: Normal range of motion.   Cardiovascular: Intact distal pulses and normal pulses.  Occasional extrasystoles are present. Bradycardia present.   Pulmonary/Chest: Breath sounds normal.   Abdominal: Soft. Bowel sounds are normal. There is no tenderness. There is no guarding.   Neurological: She is alert.   Skin: Skin is warm and dry.   Psychiatric: Her behavior is normal. Thought content normal.     CBC:   Recent Labs   Lab 05/01/19  0533   WBC 13.75*   HGB 10.7*   HCT 32.3*   MCV 85        BMP:  Recent Labs   Lab 05/01/19 0533   GLU 90      K 3.9      CO2 26   BUN 14   CREATININE 0.7   CALCIUM 8.1*     LFT:  Lab Results   Component Value Date    AST 56 (H) 05/01/2019    ALKPHOS 205 (H) 05/01/2019    BILITOT 3.6 (H) 05/01/2019     Albumin:   Albumin   Date Value Ref Range Status   05/01/2019 1.8 (L) 3.5 - 5.2 g/dL Final     Protein:   Total Protein   Date Value Ref Range Status   05/01/2019 5.4 (L) 6.0 - 8.4 g/dL Final     Lactic acid:   Lab Results   Component Value Date    LACTATE 1.7 04/26/2019    LACTATE 2.2 04/18/2019     Advance Care Planning   Advanced Directives::  Living Will: No  LaPOST: No  Do Not Resuscitate Status: Yes  Medical Power of : Yes./Not on chart Agent's Name:Ramon Torrez  Agent's Contact Number [phone]    Decision-Making Capacity: Patient answered questions, Family answered questions     Living Arrangements: Lives with her son but daughter is primary caregiver    Psychosocial/Cultural: Patient has short term memory loss.  She is  with 3 adult children who are actively involved in her care.  She lives with her son but her daughter is her main caregiver.  Her daughter is a retired RN.       Spiritual:     F- Donna and Belief: Restorationism    I - Importance: somewhat to patient.  More important to daughter  .  C - Community:  not active    A - Address in Care:  visits

## 2019-05-01 NOTE — ASSESSMENT & PLAN NOTE
CT abdomen & U/s show   - A mass was identified in the pancreatic head. This was staged T3 Nx M1 (based on metastasis to liver and pending cytologic confirmation). The staging applies if malignancy is confirmed.  -onc consulted, appreciate recs & planning outpatient management  CA 19-9 >60K

## 2019-05-01 NOTE — PROGRESS NOTES
[facility] Hospital Medicine  Progress Note    Patient Name: Zeynep Hamm  MRN: [id number]  Patient Class: IP- Inpatient   Admission Date: 4/26/2019  Length of Stay: 4 days  Attending Physician: Michelle Nunez MD  Primary Care Provider: Maya Kelley MD    [facility] Medicine Team: [facility] HOSP MED 5 Anitra Michaud MD    Subjective:     Principal Problem:Neoplasm of head of pancreas    HPI:  87 years old female with past medical history of Alzheimer, COPD, Cataract, Celiac disease, Diastolic HF (EF 45%) presented to ED with fatigue.  Per patient daughter, she has been increasingly getting weaker over the last 3 weeks to a point where she is unable to stand up today and that's when the daughter decided to bring the patient to ED, at baseline patient able to walk short distance, she also has been sleeping more, eat less than usual with gradual wieght loss over coupleof weeks, noticed to have yellowish skin discoloration today, otherwise has no fever, chills, chest pain, shortness of breath, abdominal pain, nausea, vomiting, diarrhea and urinary symptoms. Patient quit smoking 35 years ago, doesn't drink alcohol, no recent surgeries or travel, has family history of leukemia and mediastinal mass.She was admitted to the hospital recently on 4/18/2019 with COPD exacerbation and new onset systolic congestive heart failure, started on lasix.  At ED patient patient was hypotensive and tachycardic, labs significant for elevated WBC, LFTs and bilirubin, U/S abdomin showed head of pancrease mass with extra and intrahepatic biliary ductal dilatation and hepatic lesions.        Hospital Course:  Admitted for suspect cholangitis. ERCP done with liver met cytology pending.      Interval History: Doing well overnight. Transitioning to PO abx today. She refused physical exam today but otherwise has no complaints.       Review of Systems   Constitutional: Positive for diaphoresis. Negative for chills and fever.    Respiratory: Negative for cough.    Cardiovascular: Negative for chest pain and palpitations.   Gastrointestinal: Negative for abdominal pain.   Genitourinary: Negative for dysuria.     Objective:     Vital Signs (Most Recent):  Temp: 97.6 °F (36.4 °C) (04/30/19 2042)  Pulse: 88 (04/30/19 2042)  Resp: 16 (04/30/19 2042)  BP: (!) 97/52 (04/30/19 2042)  SpO2: 96 % (04/30/19 2042) Vital Signs (24h Range):  Temp:  [96.3 °F (35.7 °C)-97.8 °F (36.6 °C)] 97.6 °F (36.4 °C)  Pulse:  [] 88  Resp:  [16-20] 16  SpO2:  [93 %-99 %] 96 %  BP: ()/(52-73) 97/52     Weight: 48.5 kg (107 lb)  Body mass index is 18.95 kg/m².    Intake/Output Summary (Last 24 hours) at 4/30/2019 2120  Last data filed at 4/30/2019 1500  Gross per 24 hour   Intake 240 ml   Output --   Net 240 ml      Physical Exam   Constitutional: She appears well-developed. No distress.   thin   HENT:   Head: Normocephalic and atraumatic.   Eyes: Pupils are equal, round, and reactive to light.   Neck: Neck supple.   Cardiovascular: Normal rate, regular rhythm and normal heart sounds.   Pulmonary/Chest: Effort normal and breath sounds normal. No respiratory distress.   Abdominal: Soft. Bowel sounds are normal. She exhibits no distension. There is tenderness (Very mild, RUQ).   Musculoskeletal: She exhibits no edema or tenderness.   Lymphadenopathy:     She has no cervical adenopathy.   Neurological: She is alert.   disoriented to time and situation, but alert to place.   Pleasant    Skin: She is not diaphoretic. No pallor.   slightly jaundiced   Psychiatric: She has a normal mood and affect.   Vitals reviewed.      Significant Labs: All pertinent labs within the past 24 hours have been reviewed.    Significant Imaging: I have reviewed all pertinent imaging results/findings within the past 24 hours.    Assessment/Plan:      * Neoplasm of head of pancreas  Newly diagnosed, with what is likely mets to the liver  - Plan to discuss with daughter who per patient  is POA   - heme/onc following, appreciate assistance  -daughter would like to pursue hospice options      Acute cholangitis   S/p ERCP  -transitioned from zosyn to cipro & flagyl 4-7 days      DNR (do not resuscitate)        Palliative care encounter  Appreciate assistance    Metastasis to liver  S/p bx pending cytology results    Pancreatic mass  CT abdomen & U/s show   - A mass was identified in the pancreatic head. This was staged T3 Nx M1 (based on metastasis to liver and pending cytologic confirmation). The staging applies if malignancy is confirmed.  -onc consulted, appreciate recs & planning outpatient management  CA 19-9 >60K     Altered mental status, unspecified  Daughter provides most of information    Biliary obstruction  T bili trendign down    Goals of care, counseling/discussion  Made DNR, Brief goals discuss.  Expect discharge to hospice         COPD (chronic obstructive pulmonary disease)  Stable Patient on Combo ICS LABA and LABA LAMA outpatient   - continue ICS, LABA, Added LAMA (spiriva),   - breathing treatment as needed        Congestive heart failure (CHF)  Borderline EF with signs of diastolic dysfunction  - Holding BB given severe COPD  - BP control to help reduce risk of flash Pulm edema.      Celiac disease  Stable no active issue     Plan:   - gluten free diet         Dementia in Alzheimer's disease  Patient mental status at baseline    Plan:  - Delirium precautions  - PT/OT             VTE Risk Mitigation (From admission, onward)        Ordered     enoxaparin injection 40 mg  Daily      04/26/19 1926     IP VTE HIGH RISK PATIENT  Once      04/26/19 1926     Place CAT hose  Until discontinued      04/26/19 1845     Place sequential compression device  Until discontinued      04/26/19 1845              Anitra Michaud MD  Department of Hospital Medicine   [facility]

## 2019-05-02 LAB
ALBUMIN SERPL BCP-MCNC: 1.7 G/DL (ref 3.5–5.2)
ALP SERPL-CCNC: 182 U/L (ref 55–135)
ALT SERPL W/O P-5'-P-CCNC: 35 U/L (ref 10–44)
ANION GAP SERPL CALC-SCNC: 7 MMOL/L (ref 8–16)
AST SERPL-CCNC: 46 U/L (ref 10–40)
BASOPHILS # BLD AUTO: 0.05 K/UL (ref 0–0.2)
BASOPHILS NFR BLD: 0.3 % (ref 0–1.9)
BILIRUB SERPL-MCNC: 3.2 MG/DL (ref 0.1–1)
BUN SERPL-MCNC: 11 MG/DL (ref 8–23)
CALCIUM SERPL-MCNC: 8 MG/DL (ref 8.7–10.5)
CHLORIDE SERPL-SCNC: 104 MMOL/L (ref 95–110)
CO2 SERPL-SCNC: 25 MMOL/L (ref 23–29)
CREAT SERPL-MCNC: 0.6 MG/DL (ref 0.5–1.4)
DIFFERENTIAL METHOD: ABNORMAL
EOSINOPHIL # BLD AUTO: 0.2 K/UL (ref 0–0.5)
EOSINOPHIL NFR BLD: 1.5 % (ref 0–8)
ERYTHROCYTE [DISTWIDTH] IN BLOOD BY AUTOMATED COUNT: 15.9 % (ref 11.5–14.5)
EST. GFR  (AFRICAN AMERICAN): >60 ML/MIN/1.73 M^2
EST. GFR  (NON AFRICAN AMERICAN): >60 ML/MIN/1.73 M^2
GLUCOSE SERPL-MCNC: 112 MG/DL (ref 70–110)
HCT VFR BLD AUTO: 30.1 % (ref 37–48.5)
HGB BLD-MCNC: 10 G/DL (ref 12–16)
IMM GRANULOCYTES # BLD AUTO: 0.18 K/UL (ref 0–0.04)
IMM GRANULOCYTES NFR BLD AUTO: 1.1 % (ref 0–0.5)
LYMPHOCYTES # BLD AUTO: 1.1 K/UL (ref 1–4.8)
LYMPHOCYTES NFR BLD: 6.8 % (ref 18–48)
MCH RBC QN AUTO: 28.2 PG (ref 27–31)
MCHC RBC AUTO-ENTMCNC: 33.2 G/DL (ref 32–36)
MCV RBC AUTO: 85 FL (ref 82–98)
MONOCYTES # BLD AUTO: 1.1 K/UL (ref 0.3–1)
MONOCYTES NFR BLD: 7.2 % (ref 4–15)
NEUTROPHILS # BLD AUTO: 13.1 K/UL (ref 1.8–7.7)
NEUTROPHILS NFR BLD: 83.1 % (ref 38–73)
NRBC BLD-RTO: 0 /100 WBC
PLATELET # BLD AUTO: 198 K/UL (ref 150–350)
PMV BLD AUTO: 11.1 FL (ref 9.2–12.9)
POTASSIUM SERPL-SCNC: 3.3 MMOL/L (ref 3.5–5.1)
PROT SERPL-MCNC: 4.9 G/DL (ref 6–8.4)
RBC # BLD AUTO: 3.55 M/UL (ref 4–5.4)
SODIUM SERPL-SCNC: 136 MMOL/L (ref 136–145)
WBC # BLD AUTO: 15.79 K/UL (ref 3.9–12.7)

## 2019-05-02 PROCEDURE — 63600175 PHARM REV CODE 636 W HCPCS: Mod: HCNC | Performed by: STUDENT IN AN ORGANIZED HEALTH CARE EDUCATION/TRAINING PROGRAM

## 2019-05-02 PROCEDURE — 99232 PR SUBSEQUENT HOSPITAL CARE,LEVL II: ICD-10-PCS | Mod: HCNC,GC,, | Performed by: INTERNAL MEDICINE

## 2019-05-02 PROCEDURE — 99232 SBSQ HOSP IP/OBS MODERATE 35: CPT | Mod: HCNC,GC,, | Performed by: INTERNAL MEDICINE

## 2019-05-02 PROCEDURE — 99231 SBSQ HOSP IP/OBS SF/LOW 25: CPT | Mod: HCNC,,, | Performed by: CLINICAL NURSE SPECIALIST

## 2019-05-02 PROCEDURE — 85025 COMPLETE CBC W/AUTO DIFF WBC: CPT | Mod: HCNC

## 2019-05-02 PROCEDURE — 99231 PR SUBSEQUENT HOSPITAL CARE,LEVL I: ICD-10-PCS | Mod: HCNC,,, | Performed by: CLINICAL NURSE SPECIALIST

## 2019-05-02 PROCEDURE — 11000001 HC ACUTE MED/SURG PRIVATE ROOM: Mod: HCNC

## 2019-05-02 PROCEDURE — 80053 COMPREHEN METABOLIC PANEL: CPT | Mod: HCNC

## 2019-05-02 PROCEDURE — 36415 COLL VENOUS BLD VENIPUNCTURE: CPT | Mod: HCNC

## 2019-05-02 PROCEDURE — 25000003 PHARM REV CODE 250: Mod: HCNC | Performed by: STUDENT IN AN ORGANIZED HEALTH CARE EDUCATION/TRAINING PROGRAM

## 2019-05-02 RX ORDER — POTASSIUM CHLORIDE 20 MEQ/1
40 TABLET, EXTENDED RELEASE ORAL
Status: COMPLETED | OUTPATIENT
Start: 2019-05-02 | End: 2019-05-02

## 2019-05-02 RX ADMIN — POTASSIUM CHLORIDE 40 MEQ: 1500 TABLET, EXTENDED RELEASE ORAL at 01:05

## 2019-05-02 RX ADMIN — ENOXAPARIN SODIUM 40 MG: 100 INJECTION SUBCUTANEOUS at 04:05

## 2019-05-02 RX ADMIN — METRONIDAZOLE 500 MG: 500 TABLET ORAL at 01:05

## 2019-05-02 RX ADMIN — POTASSIUM CHLORIDE 40 MEQ: 1500 TABLET, EXTENDED RELEASE ORAL at 09:05

## 2019-05-02 RX ADMIN — METRONIDAZOLE 500 MG: 500 TABLET ORAL at 09:05

## 2019-05-02 RX ADMIN — METRONIDAZOLE 500 MG: 500 TABLET ORAL at 06:05

## 2019-05-02 RX ADMIN — CIPROFLOXACIN HYDROCHLORIDE 500 MG: 500 TABLET, FILM COATED ORAL at 09:05

## 2019-05-02 RX ADMIN — FLUTICASONE FUROATE AND VILANTEROL TRIFENATATE 1 PUFF: 100; 25 POWDER RESPIRATORY (INHALATION) at 09:05

## 2019-05-02 NOTE — PLAN OF CARE
Problem: Adult Inpatient Plan of Care  Goal: Plan of Care Review  Outcome: Ongoing (interventions implemented as appropriate)  Recommendations    Recommendation:   1. Continue regular diet-add gluten free diet restriction   2. Add boost plus TID   3. Assistance/encouragment at meal times  RD to follow     Goals: 1. PO >85% of meals and ONS acceptance while admitted 2. Prevent wt loss >2% per week  Nutrition Goal Status: new  Communication of RD Recs: (POC)

## 2019-05-02 NOTE — SUBJECTIVE & OBJECTIVE
Interval History: BP improved with hydration. She denies appetite still but is doing BOOST which she tolerates.     Review of Systems   Constitutional: Positive for diaphoresis. Negative for chills and fever.   Respiratory: Negative for cough.    Cardiovascular: Negative for chest pain and palpitations.   Gastrointestinal: Negative for abdominal pain.   Genitourinary: Negative for dysuria.     Objective:     Vital Signs (Most Recent):  Temp: 97.9 °F (36.6 °C) (05/02/19 1701)  Pulse: 81 (05/02/19 1701)  Resp: 14 (05/02/19 1701)  BP: 120/69 (05/02/19 1701)  SpO2: (!) 92 % (05/02/19 1701) Vital Signs (24h Range):  Temp:  [96.3 °F (35.7 °C)-98.2 °F (36.8 °C)] 97.9 °F (36.6 °C)  Pulse:  [75-89] 81  Resp:  [4-18] 14  SpO2:  [92 %-93 %] 92 %  BP: (105-120)/(60-69) 120/69     Weight: 48.5 kg (107 lb)  Body mass index is 18.95 kg/m².  No intake or output data in the 24 hours ending 05/02/19 1707   Physical Exam   Constitutional: She appears well-developed. No distress.   thin   HENT:   Head: Normocephalic and atraumatic.   Eyes: Pupils are equal, round, and reactive to light.   Neck: Neck supple.   Cardiovascular: Normal rate, regular rhythm and normal heart sounds.   Pulmonary/Chest: Effort normal and breath sounds normal. No respiratory distress.   Abdominal: Soft. Bowel sounds are normal. She exhibits no distension. There is tenderness (Very mild, RUQ).   Musculoskeletal: She exhibits no edema or tenderness.   Lymphadenopathy:     She has no cervical adenopathy.   Neurological: She is alert.   disoriented to time and situation, but alert to place.   Pleasant    Skin: She is not diaphoretic. No pallor.   slightly jaundiced   Psychiatric: She has a normal mood and affect.   Vitals reviewed.      Significant Labs: All pertinent labs within the past 24 hours have been reviewed.    Significant Imaging: I have reviewed all pertinent imaging results/findings within the past 24 hours.

## 2019-05-02 NOTE — PLAN OF CARE
"SW notified Dwightial Marshall to contact this SW when pt is "offically accepted".    Thao Grant, [facility]  [phone]  "

## 2019-05-02 NOTE — PROGRESS NOTES
[facility] Hospital Medicine  Progress Note    Patient Name: Zeynep Hamm  MRN: [id number]  Patient Class: IP- Inpatient   Admission Date: 4/26/2019  Length of Stay: 6 days  Attending Physician: Michelle Nunez MD  Primary Care Provider: Maya Kelley MD    [facility] [place] Medicine Team: [facility] 5 Anitra Michaud MD    Subjective:     Principal Problem:Neoplasm of head of pancreas    HPI:  87 years old female with past medical history of Alzheimer, COPD, Cataract, Celiac disease, Diastolic HF (EF 45%) presented to ED with fatigue.  Per patient daughter, she has been increasingly getting weaker over the last 3 weeks to a point where she is unable to stand up today and that's when the daughter decided to bring the patient to ED, at baseline patient able to walk short distance, she also has been sleeping more, eat less than usual with gradual wieght loss over coupleof weeks, noticed to have yellowish skin discoloration today, otherwise has no fever, chills, chest pain, shortness of breath, abdominal pain, nausea, vomiting, diarrhea and urinary symptoms. Patient quit smoking 35 years ago, doesn't drink alcohol, no recent surgeries or travel, has family history of leukemia and mediastinal mass.She was admitted to the hospital recently on 4/18/2019 with COPD exacerbation and new onset systolic congestive heart failure, started on lasix.  At ED patient patient was hypotensive and tachycardic, labs significant for elevated WBC, LFTs and bilirubin, U/S abdomin showed head of pancrease mass with extra and intrahepatic biliary ductal dilatation and hepatic lesions.        Hospital Course:  Admitted for suspect cholangitis. ERCP done with liver met cytology pending.      Interval History: BP improved with hydration. She denies appetite still but is doing BOOST which she tolerates.     Review of Systems   Constitutional: Positive for diaphoresis. Negative for chills and fever.   Respiratory: Negative for  cough.    Cardiovascular: Negative for chest pain and palpitations.   Gastrointestinal: Negative for abdominal pain.   Genitourinary: Negative for dysuria.     Objective:     Vital Signs (Most Recent):  Temp: 97.9 °F (36.6 °C) (05/02/19 1701)  Pulse: 81 (05/02/19 1701)  Resp: 14 (05/02/19 1701)  BP: 120/69 (05/02/19 1701)  SpO2: (!) 92 % (05/02/19 1701) Vital Signs (24h Range):  Temp:  [96.3 °F (35.7 °C)-98.2 °F (36.8 °C)] 97.9 °F (36.6 °C)  Pulse:  [75-89] 81  Resp:  [4-18] 14  SpO2:  [92 %-93 %] 92 %  BP: (105-120)/(60-69) 120/69     Weight: 48.5 kg (107 lb)  Body mass index is 18.95 kg/m².  No intake or output data in the 24 hours ending 05/02/19 1707   Physical Exam   Constitutional: She appears well-developed. No distress.   thin   HENT:   Head: Normocephalic and atraumatic.   Eyes: Pupils are equal, round, and reactive to light.   Neck: Neck supple.   Cardiovascular: Normal rate, regular rhythm and normal heart sounds.   Pulmonary/Chest: Effort normal and breath sounds normal. No respiratory distress.   Abdominal: Soft. Bowel sounds are normal. She exhibits no distension. There is tenderness (Very mild, RUQ).   Musculoskeletal: She exhibits no edema or tenderness.   Lymphadenopathy:     She has no cervical adenopathy.   Neurological: She is alert.   disoriented to time and situation, but alert to place.   Pleasant    Skin: She is not diaphoretic. No pallor.   slightly jaundiced   Psychiatric: She has a normal mood and affect.   Vitals reviewed.      Significant Labs: All pertinent labs within the past 24 hours have been reviewed.    Significant Imaging: I have reviewed all pertinent imaging results/findings within the past 24 hours.    Assessment/Plan:      * Neoplasm of head of pancreas  Newly diagnosed, with what is likely mets to the liver  - Plan to discuss with daughter who per patient is POA   - heme/onc following, appreciate assistance  -daughter would like to pursue hospice options      Acute  cholangitis   S/p ERCP  -transitioned from zosyn to cipro & flagyl 7 days PO       DNR (do not resuscitate)        Palliative care encounter  Appreciate assistance  -dispo to [facility] pending cytology     Metastasis to liver  S/p bx pending cytology results    Pancreatic mass  CT abdomen & U/s show   - A mass was identified in the pancreatic head. This was staged T3 Nx M1 (based on metastasis to liver and pending cytologic confirmation). The staging applies if malignancy is confirmed.  -onc consulted, appreciate recs & planning outpatient management  CA 19-9 >60K     Altered mental status, unspecified  Daughter provides most of information    Biliary obstruction  T bili trendign down    Goals of care, counseling/discussion  Made DNR, Brief goals discuss.  Expect discharge to hospice         COPD (chronic obstructive pulmonary disease)  Stable Patient on Combo ICS LABA and LABA LAMA outpatient   - continue ICS, LABA, Added LAMA (spiriva),   - breathing treatment as needed        Congestive heart failure (CHF)  Borderline EF with signs of diastolic dysfunction  - Holding BB given severe COPD  - BP control to help reduce risk of flash Pulm edema.      Celiac disease  Stable no active issue     Plan:   - gluten free diet   -nutrition consult given history of celiac disease and better sources of gluten free options        Dementia in Alzheimer's disease  Patient mental status at baseline    Plan:  - Delirium precautions  - PT/OT             VTE Risk Mitigation (From admission, onward)        Ordered     enoxaparin injection 40 mg  Daily      04/26/19 1926     IP VTE HIGH RISK PATIENT  Once      04/26/19 1926     Place CAT hose  Until discontinued      04/26/19 1845     Place sequential compression device  Until discontinued      04/26/19 1845              Anitra Michaud MD  Department of Hospital Medicine   [facility]

## 2019-05-02 NOTE — CONSULTS
"  [facility]  Adult Nutrition  Consult Note    SUMMARY     Recommendations    Recommendation:   1. Continue regular diet-add gluten free diet restriction   2. Add boost plus TID   3. Assistance/encouragment at meal times  RD to follow     Goals: 1. PO >85% of meals and ONS acceptance while admitted 2. Prevent wt loss >2% per week  Nutrition Goal Status: new  Communication of RD Recs: (POC)    Reason for Assessment    Reason For Assessment: consult  Diagnosis: (altered mental status)  Relevant Medical History: Alzhemier; COPD; cataract; distaolic heart failure  Interdisciplinary Rounds: did not attend  General Information Comments: Pt with fatigue and altered mental status. Per pt she states appetite fair, 50-75% of meals today. Pt receiving boost breeze, drinking daily. Recommend boost plus BID. Discussed with pt celiac disease and gluten free diet, provided nutrition therapy papers for gluten free diet. Pt unsure of recent wt loss or UBW. NFPE completed on 19, pt with moderate muslce and fat loss. RD to follow up  Nutrition Discharge Planning: d/c on gluten free diet     Nutrition Risk Screen    Nutrition Risk Screen: no indicators present    Nutrition/Diet History    Patient Reported Diet/Restrictions/Preferences: general  Spiritual, Cultural Beliefs, Muslim Practices, Values that Affect Care: no  Food Allergies: (gluten)  Factors Affecting Nutritional Intake: decreased appetite    Anthropometrics    Temp: 98.2 °F (36.8 °C)  Height Method: Stated  Height: 5' 3" (160 cm)  Height (inches): 63 in  Weight Method: Stated  Weight: 48.5 kg (107 lb)  Weight (lb): 107 lb  Ideal Body Weight (IBW), Female: 115 lb  % Ideal Body Weight, Female (lb): 93.04 lb  BMI (Calculated): 19  BMI Grade: 18.5-24.9 - normal  Weight Loss: unintentional  Usual Body Weight (UBW), k.18 kg  % Usual Body Weight: 91.46    Lab/Procedures/Meds    Pertinent Labs Reviewed: reviewed  Pertinent Labs Comments: BEL 3.3; " Glucose 112; Ca 8.0  Pertinent Medications Reviewed: reviewed  Pertinent Medications Comments: potassium chloride; enoxaparin    Estimated/Assessed Needs    Weight Used For Calorie Calculations: 48.5 kg (106 lb 14.8 oz)  Energy Calorie Requirements (kcal): 0589-2656 kcal/day (30-35 kcal/kg)  Energy Need Method: Kcal/kg  Protein Requirements: 63-72 g/day (1.3-1.5 g/kg)  Weight Used For Protein Calculations: 48.5 kg (106 lb 14.8 oz)  Fluid Requirements (mL): 1 mL/kcal or per MD  Estimated Fluid Requirement Method: RDA Method  RDA Method (mL): 1455     Nutrition Prescription Ordered    Current Diet Order: Regular diet   Nutrition Order Comments: Gluten free diet   Oral Nutrition Supplement: add boost plus TID    Evaluation of Received Nutrient/Fluid Intake    Energy Calories Required: not meeting needs  Protein Required: not meeting needs  Fluid Required: meeting needs  Tolerance: tolerating  % Intake of Estimated Energy Needs: 50 - 75 %  % Meal Intake: 50 - 75 %    Nutrition Risk    Level of Risk/Frequency of Follow-up: low(f/u 1x/week)     Assessment and Plan    Moderate malnutrition in the context of Acute Illness/Injury    Related to (etiology):  Decreased appetite 2/2 fatigue     Signs and Symptoms (as evidenced by):  Energy Intake: <75% of estimated energy requirement for >1 month  Body Fat Depletion: mild and moderate depletion of orbitals and triceps   Muscle Mass Depletion: mild and moderate depletion of temples, clavicle region, scapular region and interosseous muscle   Weight Loss: 8% x >3 months     Interventions (treatment strategy):  Commercial beverage  Collaboration with other providers   Gluten free diet     Nutrition Diagnosis Status:  New    Monitor and Evaluation    Food and Nutrient Intake: energy intake, food and beverage intake  Food and Nutrient Adminstration: diet order  Knowledge/Beliefs/Attitudes: food and nutrition knowledge/skill  Anthropometric Measurements: weight, weight  change  Biochemical Data, Medical Tests and Procedures: electrolyte and renal panel, lipid profile, gastrointestinal profile, glucose/endocrine profile, inflammatory profile  Nutrition-Focused Physical Findings: overall appearance, extremities, muscles and bones     Malnutrition Assessment  Malnutrition Type: acute illness or injury          Subcutaneous Fat (Malnutrition): mild depletion  Muscle Mass (Malnutrition): moderate depletion   Orbital Region (Subcutaneous Fat Loss): moderate depletion  Upper Arm Region (Subcutaneous Fat Loss): mild depletion   [place] Region (Muscle Loss): moderate depletion  Clavicle Bone Region (Muscle Loss): moderate depletion  Clavicle and Acromion Bone Region (Muscle Loss): mild depletion  Scapular Bone Region (Muscle Loss): mild depletion  Anterior Thigh Region (Muscle Loss): mild depletion  Posterior Calf Region (Muscle Loss): mild depletion   Edema (Fluid Accumulation): 0-->no edema present   Subcutaneous Fat Loss (Final Summary): moderate protein-calorie malnutrition  Muscle Loss Evaluation (Final Summary): moderate protein-calorie malnutrition         Nutrition Follow-Up    RD Follow-up?: Yes

## 2019-05-03 ENCOUNTER — TELEPHONE (OUTPATIENT)
Dept: ADMINISTRATIVE | Facility: CLINIC | Age: 84
End: 2019-05-03

## 2019-05-03 PROBLEM — Z51.5 COMFORT MEASURES ONLY STATUS: Status: ACTIVE | Noted: 2019-05-03

## 2019-05-03 LAB
ABO + RH BLD: NORMAL
ALBUMIN SERPL BCP-MCNC: 1.9 G/DL (ref 3.5–5.2)
ALBUMIN SERPL BCP-MCNC: 1.9 G/DL (ref 3.5–5.2)
ALBUMIN SERPL BCP-MCNC: 2.1 G/DL (ref 3.5–5.2)
ALP SERPL-CCNC: 180 U/L (ref 55–135)
ALP SERPL-CCNC: 189 U/L (ref 55–135)
ALP SERPL-CCNC: 206 U/L (ref 55–135)
ALT SERPL W/O P-5'-P-CCNC: 33 U/L (ref 10–44)
ALT SERPL W/O P-5'-P-CCNC: 35 U/L (ref 10–44)
ALT SERPL W/O P-5'-P-CCNC: 39 U/L (ref 10–44)
ANION GAP SERPL CALC-SCNC: 7 MMOL/L (ref 8–16)
ANION GAP SERPL CALC-SCNC: 7 MMOL/L (ref 8–16)
ANION GAP SERPL CALC-SCNC: 8 MMOL/L (ref 8–16)
ANION GAP SERPL CALC-SCNC: 9 MMOL/L (ref 8–16)
AST SERPL-CCNC: 63 U/L (ref 10–40)
AST SERPL-CCNC: 64 U/L (ref 10–40)
AST SERPL-CCNC: 89 U/L (ref 10–40)
BASOPHILS # BLD AUTO: 0.1 K/UL (ref 0–0.2)
BASOPHILS # BLD AUTO: 0.14 K/UL (ref 0–0.2)
BASOPHILS NFR BLD: 0.5 % (ref 0–1.9)
BASOPHILS NFR BLD: 0.7 % (ref 0–1.9)
BILIRUB SERPL-MCNC: 4.2 MG/DL (ref 0.1–1)
BILIRUB SERPL-MCNC: 5.1 MG/DL (ref 0.1–1)
BILIRUB SERPL-MCNC: 5.1 MG/DL (ref 0.1–1)
BLD GP AB SCN CELLS X3 SERPL QL: NORMAL
BUN SERPL-MCNC: 15 MG/DL (ref 8–23)
BUN SERPL-MCNC: 16 MG/DL (ref 8–23)
BUN SERPL-MCNC: 19 MG/DL (ref 8–23)
BUN SERPL-MCNC: 22 MG/DL (ref 8–23)
CALCIUM SERPL-MCNC: 8.1 MG/DL (ref 8.7–10.5)
CALCIUM SERPL-MCNC: 8.2 MG/DL (ref 8.7–10.5)
CALCIUM SERPL-MCNC: 8.3 MG/DL (ref 8.7–10.5)
CALCIUM SERPL-MCNC: 8.4 MG/DL (ref 8.7–10.5)
CHLORIDE SERPL-SCNC: 106 MMOL/L (ref 95–110)
CHLORIDE SERPL-SCNC: 107 MMOL/L (ref 95–110)
CHLORIDE SERPL-SCNC: 108 MMOL/L (ref 95–110)
CHLORIDE SERPL-SCNC: 109 MMOL/L (ref 95–110)
CO2 SERPL-SCNC: 20 MMOL/L (ref 23–29)
CO2 SERPL-SCNC: 21 MMOL/L (ref 23–29)
CO2 SERPL-SCNC: 22 MMOL/L (ref 23–29)
CO2 SERPL-SCNC: 23 MMOL/L (ref 23–29)
CREAT SERPL-MCNC: 0.7 MG/DL (ref 0.5–1.4)
CREAT SERPL-MCNC: 0.8 MG/DL (ref 0.5–1.4)
CREAT SERPL-MCNC: 0.9 MG/DL (ref 0.5–1.4)
CREAT SERPL-MCNC: 1.1 MG/DL (ref 0.5–1.4)
DIFFERENTIAL METHOD: ABNORMAL
DIFFERENTIAL METHOD: ABNORMAL
EOSINOPHIL # BLD AUTO: 0.2 K/UL (ref 0–0.5)
EOSINOPHIL # BLD AUTO: 0.2 K/UL (ref 0–0.5)
EOSINOPHIL NFR BLD: 0.9 % (ref 0–8)
EOSINOPHIL NFR BLD: 1 % (ref 0–8)
ERYTHROCYTE [DISTWIDTH] IN BLOOD BY AUTOMATED COUNT: 17.2 % (ref 11.5–14.5)
ERYTHROCYTE [DISTWIDTH] IN BLOOD BY AUTOMATED COUNT: 17.4 % (ref 11.5–14.5)
EST. GFR  (AFRICAN AMERICAN): 52.2 ML/MIN/1.73 M^2
EST. GFR  (AFRICAN AMERICAN): >60 ML/MIN/1.73 M^2
EST. GFR  (NON AFRICAN AMERICAN): 45.3 ML/MIN/1.73 M^2
EST. GFR  (NON AFRICAN AMERICAN): 57.7 ML/MIN/1.73 M^2
EST. GFR  (NON AFRICAN AMERICAN): >60 ML/MIN/1.73 M^2
EST. GFR  (NON AFRICAN AMERICAN): >60 ML/MIN/1.73 M^2
GLUCOSE SERPL-MCNC: 117 MG/DL (ref 70–110)
GLUCOSE SERPL-MCNC: 128 MG/DL (ref 70–110)
GLUCOSE SERPL-MCNC: 139 MG/DL (ref 70–110)
GLUCOSE SERPL-MCNC: 170 MG/DL (ref 70–110)
HCT VFR BLD AUTO: 29.2 % (ref 37–48.5)
HCT VFR BLD AUTO: 29.8 % (ref 37–48.5)
HGB BLD-MCNC: 10 G/DL (ref 12–16)
HGB BLD-MCNC: 10.3 G/DL (ref 12–16)
IMM GRANULOCYTES # BLD AUTO: 0.62 K/UL (ref 0–0.04)
IMM GRANULOCYTES # BLD AUTO: 0.64 K/UL (ref 0–0.04)
IMM GRANULOCYTES NFR BLD AUTO: 3.2 % (ref 0–0.5)
IMM GRANULOCYTES NFR BLD AUTO: 3.3 % (ref 0–0.5)
LACTATE SERPL-SCNC: 2 MMOL/L (ref 0.5–2.2)
LACTATE SERPL-SCNC: 3.7 MMOL/L (ref 0.5–2.2)
LIPASE SERPL-CCNC: 6 U/L (ref 4–60)
LYMPHOCYTES # BLD AUTO: 2.2 K/UL (ref 1–4.8)
LYMPHOCYTES # BLD AUTO: 2.5 K/UL (ref 1–4.8)
LYMPHOCYTES NFR BLD: 11.8 % (ref 18–48)
LYMPHOCYTES NFR BLD: 12.5 % (ref 18–48)
MCH RBC QN AUTO: 28.6 PG (ref 27–31)
MCH RBC QN AUTO: 28.9 PG (ref 27–31)
MCHC RBC AUTO-ENTMCNC: 34.2 G/DL (ref 32–36)
MCHC RBC AUTO-ENTMCNC: 34.6 G/DL (ref 32–36)
MCV RBC AUTO: 83 FL (ref 82–98)
MCV RBC AUTO: 84 FL (ref 82–98)
MONOCYTES # BLD AUTO: 1.4 K/UL (ref 0.3–1)
MONOCYTES # BLD AUTO: 1.7 K/UL (ref 0.3–1)
MONOCYTES NFR BLD: 7.3 % (ref 4–15)
MONOCYTES NFR BLD: 8.3 % (ref 4–15)
NEUTROPHILS # BLD AUTO: 14.3 K/UL (ref 1.8–7.7)
NEUTROPHILS # BLD AUTO: 15 K/UL (ref 1.8–7.7)
NEUTROPHILS NFR BLD: 74.3 % (ref 38–73)
NEUTROPHILS NFR BLD: 76.2 % (ref 38–73)
NRBC BLD-RTO: 0 /100 WBC
NRBC BLD-RTO: 0 /100 WBC
PLATELET # BLD AUTO: 113 K/UL (ref 150–350)
PLATELET # BLD AUTO: 124 K/UL (ref 150–350)
PMV BLD AUTO: 10 FL (ref 9.2–12.9)
PMV BLD AUTO: 11.8 FL (ref 9.2–12.9)
POTASSIUM SERPL-SCNC: 4.6 MMOL/L (ref 3.5–5.1)
POTASSIUM SERPL-SCNC: 5 MMOL/L (ref 3.5–5.1)
POTASSIUM SERPL-SCNC: 5.1 MMOL/L (ref 3.5–5.1)
POTASSIUM SERPL-SCNC: 5.2 MMOL/L (ref 3.5–5.1)
PROT SERPL-MCNC: 5.1 G/DL (ref 6–8.4)
PROT SERPL-MCNC: 5.1 G/DL (ref 6–8.4)
PROT SERPL-MCNC: 5.6 G/DL (ref 6–8.4)
RBC # BLD AUTO: 3.5 M/UL (ref 4–5.4)
RBC # BLD AUTO: 3.57 M/UL (ref 4–5.4)
SODIUM SERPL-SCNC: 136 MMOL/L (ref 136–145)
SODIUM SERPL-SCNC: 137 MMOL/L (ref 136–145)
WBC # BLD AUTO: 18.7 K/UL (ref 3.9–12.7)
WBC # BLD AUTO: 20.15 K/UL (ref 3.9–12.7)

## 2019-05-03 PROCEDURE — 25000003 PHARM REV CODE 250: Mod: HCNC | Performed by: STUDENT IN AN ORGANIZED HEALTH CARE EDUCATION/TRAINING PROGRAM

## 2019-05-03 PROCEDURE — 99233 SBSQ HOSP IP/OBS HIGH 50: CPT | Mod: HCNC,GC,, | Performed by: INTERNAL MEDICINE

## 2019-05-03 PROCEDURE — 93005 ELECTROCARDIOGRAM TRACING: CPT | Mod: HCNC

## 2019-05-03 PROCEDURE — 80053 COMPREHEN METABOLIC PANEL: CPT | Mod: HCNC

## 2019-05-03 PROCEDURE — 83605 ASSAY OF LACTIC ACID: CPT | Mod: HCNC

## 2019-05-03 PROCEDURE — 93010 EKG 12-LEAD: ICD-10-PCS | Mod: HCNC,,, | Performed by: INTERNAL MEDICINE

## 2019-05-03 PROCEDURE — 93010 ELECTROCARDIOGRAM REPORT: CPT | Mod: HCNC,,, | Performed by: INTERNAL MEDICINE

## 2019-05-03 PROCEDURE — 80048 BASIC METABOLIC PNL TOTAL CA: CPT | Mod: HCNC

## 2019-05-03 PROCEDURE — 99233 PR SUBSEQUENT HOSPITAL CARE,LEVL III: ICD-10-PCS | Mod: HCNC,GC,, | Performed by: INTERNAL MEDICINE

## 2019-05-03 PROCEDURE — 85025 COMPLETE CBC W/AUTO DIFF WBC: CPT | Mod: HCNC

## 2019-05-03 PROCEDURE — 83605 ASSAY OF LACTIC ACID: CPT | Mod: 91,HCNC

## 2019-05-03 PROCEDURE — 63600175 PHARM REV CODE 636 W HCPCS: Mod: HCNC | Performed by: STUDENT IN AN ORGANIZED HEALTH CARE EDUCATION/TRAINING PROGRAM

## 2019-05-03 PROCEDURE — 36415 COLL VENOUS BLD VENIPUNCTURE: CPT | Mod: HCNC

## 2019-05-03 PROCEDURE — 87040 BLOOD CULTURE FOR BACTERIA: CPT | Mod: 59,HCNC

## 2019-05-03 PROCEDURE — 80053 COMPREHEN METABOLIC PANEL: CPT | Mod: 91,HCNC

## 2019-05-03 PROCEDURE — 86850 RBC ANTIBODY SCREEN: CPT | Mod: HCNC

## 2019-05-03 PROCEDURE — 11000001 HC ACUTE MED/SURG PRIVATE ROOM: Mod: HCNC

## 2019-05-03 PROCEDURE — 83690 ASSAY OF LIPASE: CPT | Mod: HCNC

## 2019-05-03 RX ORDER — METOPROLOL TARTRATE 1 MG/ML
5 INJECTION, SOLUTION INTRAVENOUS EVERY 5 MIN PRN
Status: DISCONTINUED | OUTPATIENT
Start: 2019-05-03 | End: 2019-05-03

## 2019-05-03 RX ORDER — ONDANSETRON 2 MG/ML
4 INJECTION INTRAMUSCULAR; INTRAVENOUS ONCE
Status: COMPLETED | OUTPATIENT
Start: 2019-05-03 | End: 2019-05-03

## 2019-05-03 RX ORDER — ONDANSETRON 2 MG/ML
4 INJECTION INTRAMUSCULAR; INTRAVENOUS EVERY 6 HOURS PRN
Status: DISCONTINUED | OUTPATIENT
Start: 2019-05-03 | End: 2019-05-03

## 2019-05-03 RX ORDER — MORPHINE SULFATE 2 MG/ML
1 INJECTION, SOLUTION INTRAMUSCULAR; INTRAVENOUS
Status: DISCONTINUED | OUTPATIENT
Start: 2019-05-03 | End: 2019-05-03

## 2019-05-03 RX ORDER — LORAZEPAM 0.5 MG/1
0.5 TABLET ORAL EVERY 6 HOURS PRN
Status: DISCONTINUED | OUTPATIENT
Start: 2019-05-03 | End: 2019-05-08 | Stop reason: HOSPADM

## 2019-05-03 RX ORDER — LORAZEPAM 1 MG/1
1 TABLET ORAL EVERY 6 HOURS PRN
Status: DISCONTINUED | OUTPATIENT
Start: 2019-05-03 | End: 2019-05-03

## 2019-05-03 RX ORDER — MORPHINE SULFATE 2 MG/ML
1 INJECTION, SOLUTION INTRAMUSCULAR; INTRAVENOUS EVERY 6 HOURS PRN
Status: DISCONTINUED | OUTPATIENT
Start: 2019-05-03 | End: 2019-05-08 | Stop reason: HOSPADM

## 2019-05-03 RX ORDER — ADENOSINE 3 MG/ML
6 INJECTION, SOLUTION INTRAVENOUS ONCE
Status: DISCONTINUED | OUTPATIENT
Start: 2019-05-03 | End: 2019-05-03

## 2019-05-03 RX ORDER — METOPROLOL TARTRATE 1 MG/ML
INJECTION, SOLUTION INTRAVENOUS
Status: DISPENSED
Start: 2019-05-03 | End: 2019-05-03

## 2019-05-03 RX ORDER — MORPHINE SULFATE 2 MG/ML
2 INJECTION, SOLUTION INTRAMUSCULAR; INTRAVENOUS EVERY 4 HOURS PRN
Status: DISCONTINUED | OUTPATIENT
Start: 2019-05-03 | End: 2019-05-03

## 2019-05-03 RX ORDER — ONDANSETRON 2 MG/ML
4 INJECTION INTRAMUSCULAR; INTRAVENOUS EVERY 6 HOURS PRN
Status: DISCONTINUED | OUTPATIENT
Start: 2019-05-03 | End: 2019-05-08

## 2019-05-03 RX ORDER — LORAZEPAM 2 MG/ML
0.5 INJECTION INTRAMUSCULAR EVERY 30 MIN PRN
Status: DISCONTINUED | OUTPATIENT
Start: 2019-05-03 | End: 2019-05-03

## 2019-05-03 RX ORDER — SODIUM CHLORIDE, SODIUM LACTATE, POTASSIUM CHLORIDE, CALCIUM CHLORIDE 600; 310; 30; 20 MG/100ML; MG/100ML; MG/100ML; MG/100ML
INJECTION, SOLUTION INTRAVENOUS
Status: COMPLETED | OUTPATIENT
Start: 2019-05-03 | End: 2019-05-03

## 2019-05-03 RX ADMIN — ONDANSETRON 4 MG: 2 INJECTION INTRAMUSCULAR; INTRAVENOUS at 03:05

## 2019-05-03 RX ADMIN — LORAZEPAM 0.5 MG: 2 INJECTION, SOLUTION INTRAMUSCULAR; INTRAVENOUS at 07:05

## 2019-05-03 RX ADMIN — ONDANSETRON 4 MG: 2 INJECTION INTRAMUSCULAR; INTRAVENOUS at 07:05

## 2019-05-03 RX ADMIN — SODIUM CHLORIDE, SODIUM LACTATE, POTASSIUM CHLORIDE, AND CALCIUM CHLORIDE 1000 ML: .6; .31; .03; .02 INJECTION, SOLUTION INTRAVENOUS at 06:05

## 2019-05-03 RX ADMIN — ONDANSETRON HYDROCHLORIDE 4 MG: 4 TABLET, FILM COATED ORAL at 06:05

## 2019-05-03 RX ADMIN — SODIUM CHLORIDE, SODIUM LACTATE, POTASSIUM CHLORIDE, AND CALCIUM CHLORIDE 500 ML/HR: 600; 310; 30; 20 INJECTION, SOLUTION INTRAVENOUS at 05:05

## 2019-05-03 RX ADMIN — VANCOMYCIN HYDROCHLORIDE 750 MG: 750 INJECTION, POWDER, LYOPHILIZED, FOR SOLUTION INTRAVENOUS at 02:05

## 2019-05-03 RX ADMIN — SODIUM CHLORIDE, SODIUM LACTATE, POTASSIUM CHLORIDE, AND CALCIUM CHLORIDE 999 ML/HR: 600; 310; 30; 20 INJECTION, SOLUTION INTRAVENOUS at 06:05

## 2019-05-03 RX ADMIN — PIPERACILLIN AND TAZOBACTAM 4.5 G: 4; .5 INJECTION, POWDER, LYOPHILIZED, FOR SOLUTION INTRAVENOUS; PARENTERAL at 01:05

## 2019-05-03 RX ADMIN — PIPERACILLIN AND TAZOBACTAM 4.5 G: 4; .5 INJECTION, POWDER, LYOPHILIZED, FOR SOLUTION INTRAVENOUS; PARENTERAL at 08:05

## 2019-05-03 RX ADMIN — SODIUM CHLORIDE, SODIUM LACTATE, POTASSIUM CHLORIDE, AND CALCIUM CHLORIDE 1000 ML: .6; .31; .03; .02 INJECTION, SOLUTION INTRAVENOUS at 04:05

## 2019-05-03 RX ADMIN — ONDANSETRON 4 MG: 2 INJECTION INTRAMUSCULAR; INTRAVENOUS at 05:05

## 2019-05-03 NOTE — TELEPHONE ENCOUNTER
Home Health SOC 04/21/2019 - 06/19/2019 with [facility] - Dr. Buddy Alicea. Patient received SN, PT and OT services.

## 2019-05-03 NOTE — ASSESSMENT & PLAN NOTE
S/p ERCP  -Previously resolved, and transition to abx with acute worsening, concern for worsening infection however labs are not suggestive, continue vanc and zosyn

## 2019-05-03 NOTE — SIGNIFICANT EVENT
Called to bedside by rapid response nurse for patient hypotension with MAP 60s. Primary team at bedside. Initial concern was for SVT in patient however her subsequent EKG was sinus tach. Patient received one litre of fluid. Complained of pain when it went in fast.     Plan  - discussed with primary team who already called daughter to bedside  - lactate to be ordered, chest xray , sepsis workup   - another litre of LR to be given . Monitor BP closely   - No increase in oxygen requirement   - Goals of care with patient and daughter today, consider hospice    - continue with volume resuscitation as needed  - call ICU if there are any questions         Judit Mcadams MD, MPH  Internal Medicine PGY2  Spectra [phone]    Discussed with RR nurse, Staff Dr. Marrero, and primary team

## 2019-05-03 NOTE — SUBJECTIVE & OBJECTIVE
Past Medical History:   Diagnosis Date    Alzheimer disease     Bronchiectasis     Cataract     both eyes    Celiac disease     Emphysema of lung     Encounter for blood transfusion     Lung disease     Short-term memory loss        Past Surgical History:   Procedure Laterality Date    APPENDECTOMY      CATARACT EXTRACTION W/  INTRAOCULAR LENS IMPLANT Right 4/14/14    CATARACT EXTRACTION W/  INTRAOCULAR LENS IMPLANT Left 5/12/14    ERCP (ENDOSCOPIC RETROGRADE CHOLANGIOPANCREATOGRAPHY) N/A 4/29/2019    Performed by Emery Flores [facility] at [facility] ENDO (2ND FLR)    EXCISION-MASS Left 10/31/2013    Performed by SAL Morgan III, MD at [facility] OR 2ND FLR    EXCISION-SOFT TISSUE - right shin calcium deposits Right 8/19/2015    Performed by SAL Morgan III, MD at [facility] OR 2ND FLR    INSERTION, IOL PROSTHESIS Left 5/12/2014    [facility] by [facility] at [facility]    INSERTION, IOL PROSTHESIS Right 4/14/2014    Performed by [facility] at [facility] OR    PHACOEMULSIFICATION, CATARACT Left 5/12/2014    Performed by [facility] at [facility] OR    PHACOEMULSIFICATION, CATARACT Right 4/14/2014    Performed by [facility] at [facility] OR    TONSILLECTOMY      ULTRASOUND, UPPER GI TRACT, ENDOSCOPIC N/A 4/29/2019    Performed by Emery Flores [facility] at [facility] ENDO (2ND FLR)    uterine suspension         Review of patient's allergies indicates:   Allergen Reactions    Gluten protein        Medications:  Continuous Infusions:  Scheduled Meds:   ciprofloxacin HCl  500 mg Oral Q12H    enoxaparin  40 mg Subcutaneous Daily    fluticasone furoate-vilanterol  1 puff Inhalation Daily    metroNIDAZOLE  500 mg Oral Q8H    tiotropium  1 capsule Inhalation Daily     PRN Meds:acetaminophen, albuterol-ipratropium, dextrose 50%, dextrose 50%, glucagon (human recombinant), glucose, glucose, ondansetron, sodium chloride 0.9%    Family History     Problem Relation (Age of Onset)    COPD Brother    Cancer Mother, Father     Glaucoma Sister    Heart disease Sister        Tobacco Use    Smoking status: Former Smoker     Packs/day: 2.00     Years: 35.00     Pack years: 70.00     Types: Cigarettes     Last attempt to quit: 1988     Years since quittin.9    Smokeless tobacco: Never Used   Substance and Sexual Activity    Alcohol use: No     Alcohol/week: 0.0 oz     Comment: twice a year on special occassions    Drug use: No    Sexual activity: Not Currently     Partners: Male       Review of Systems   Constitutional: Positive for activity change, appetite change and fatigue.   Respiratory: Negative for cough and shortness of breath.    Cardiovascular: Negative for chest pain and leg swelling.   Gastrointestinal: Negative for abdominal distention, abdominal pain, nausea and vomiting.   Skin: Positive for color change and pallor.     Objective:     Vital Signs (Most Recent):  Temp: 97.9 °F (36.6 °C) (19)  Pulse: 81 (19)  Resp: 14 (19)  BP: 120/69 (19)  SpO2: (!) 92 % (19) Vital Signs (24h Range):  Temp:  [96.3 °F (35.7 °C)-98.2 °F (36.8 °C)] 97.9 °F (36.6 °C)  Pulse:  [75-89] 81  Resp:  [4-18] 14  SpO2:  [92 %-93 %] 92 %  BP: (105-120)/(60-69) 120/69     Weight: 48.5 kg (107 lb)  Body mass index is 18.95 kg/m².    Review of Symptoms  Symptom Assessment (ESAS 0-10 scale)   ESAS 0 1 2 3 4 5 6 7 8 9 10   Pain x             Dyspnea x             Anxiety x             Nausea x             Depression               Anorexia    x          Fatigue       x       Insomnia x             Restlessness  x             Agitation x             CAM / Delirium : Alzhiemers, Dementia - short term memory loss  Constipation     _x_ --  ___+   Diarrhea           _x_ --  ___+  Bowel Management Plan (BMP): No    Comments: Patient was able to report symptoms but rating was difficult    OME in 24 hours: 0    Performance Status: 40    ECOG Performance Status ndGndrndanddndend:nd nd2nd Physical Exam    Constitutional: She appears well-developed. She appears ill.   Thin    HENT:   Head: Normocephalic and atraumatic.   Eyes: Scleral icterus is present.   Neck: Normal range of motion.   Cardiovascular: Intact distal pulses and normal pulses.  Occasional extrasystoles are present. Bradycardia present.   Pulmonary/Chest: Breath sounds normal.   Abdominal: Soft. Bowel sounds are normal. There is no tenderness. There is no guarding.   Neurological: She is alert.   Skin: Skin is warm and dry.   Psychiatric: Her behavior is normal. Thought content normal.     CBC:   Recent Labs   Lab 05/02/19 0437   WBC 15.79*   HGB 10.0*   HCT 30.1*   MCV 85        BMP:  Recent Labs   Lab 05/02/19 0437   *      K 3.3*      CO2 25   BUN 11   CREATININE 0.6   CALCIUM 8.0*     LFT:  Lab Results   Component Value Date    AST 46 (H) 05/02/2019    ALKPHOS 182 (H) 05/02/2019    BILITOT 3.2 (H) 05/02/2019     Albumin:   Albumin   Date Value Ref Range Status   05/02/2019 1.7 (L) 3.5 - 5.2 g/dL Final     Protein:   Total Protein   Date Value Ref Range Status   05/02/2019 4.9 (L) 6.0 - 8.4 g/dL Final     Lactic acid:   Lab Results   Component Value Date    LACTATE 1.7 04/26/2019    LACTATE 2.2 04/18/2019     Advance Care Planning   Advanced Directives::  Living Will: No  LaPOST: No  Do Not Resuscitate Status: Yes  Medical Power of : Yes./Not on chart Agent's Name:Ramon Torrez  Agent's Contact Number [phone]    Decision-Making Capacity: Patient answered questions, Family answered questions     Living Arrangements: Lives with her son but daughter is primary caregiver    Psychosocial/Cultural: Patient has short term memory loss.  She is  with 3 adult children who are actively involved in her care.  She lives with her son but her daughter is her main caregiver.  Her daughter is a retired RN.       Spiritual:     F- Donna and Belief: Jew    I - Importance: somewhat to patient.  More important to  daughter  .  C - Community: not active    A - Address in Care:  visits

## 2019-05-03 NOTE — ASSESSMENT & PLAN NOTE
Impression: 87-year-old female with PMH of Alzheimer dementia, CHF with positive recent CT of the abdomen for large pancreatic head mass concerning for primary pancreatic malignancy and innumerable large liver lesions concerning for metastatic disease. Tissue was obtained for pathology from lesion in the left lobe of the liver.  Oncology consulted. Pathology returned postive for Pancreatic cancer..    Patient has declining performance status. Short term memory loss with intermitent confusion secondary to dementia.  Patient is aware of her basic clinical status but pts'dght is awaiting final pathology and / or more information from oncology to discuss thoroughly with her mother.  Patient's daughter has excellent understanding and insight.  Patient's daughter has decided to do SNF in nursing home and chosen [facility] as her provider.  Patients daughter states she wants her mother to have the opportunity to have some time to strengthen herself prior to returning to her home so that her brother may have easier time caring for her.     Recommendations/Plan  1) Nursing Home SNF -  Pt's daughter has chosen [facility].  Awaiting acceptance  2) Care management aware and coordinating discharge plan   3) Consider possible home hospice or nursing home with hospice if patient declines  at SNF.      Goals of Care  Follow-up discussion conducted by this APRN with the pt's daughter to discuss pt's current clinical status, and goals of care.  She stated that her mother has never wanted to in a facility but she feels this maybe the best first step.  [place] of [place] family lives her in [place] and is concerned about them having access to visit her mother.  She knows her brother could not manage her mother at home at this point.  So she feels SNF nursing home for strengthening is the best option for her mother at this time so that she may return to her home  She is aware of the  final pathology results and is at peace with her decisions pertaining her mothers post hospital care

## 2019-05-03 NOTE — NURSING
"RAPID RESPONSE NURSE NOTE     Admit Date: 2019  LOS: 7  Code Status: DNR   Date of Consult: 2019  : 1931  Age: 87 y.o.  Weight:   Wt Readings from Last 1 Encounters:   19 48.5 kg (107 lb)     Sex: female  Race: White   Bed: 70/70 A:   MRN: [id number]  Time Rapid Response Team page Received:called to see pt 419  Time Rapid Response Team at Bedside:422  Time Rapid Response Team left Bedside: 0610  Was the patient discharged from an ICU this admission?   no  Was the patient discharged from a PACU within last 24 hours?  no  Did the patient receive conscious sedation/general anesthesia within last 24 hours?  no  Was the patient in the ED within the past 24 hours?  no  Was the patient started on NIPPV within the past 24 hours?  no  Did this progress into an ARC or CPA:  no  Attending Physician: Michelle Nunez MD  Primary Service: [facility] MED 5  Consult Requested By: Michelle Nunez MD     SITUATION     Reason for Call:   Called to evaluate the patient for Respiratory    BACKGROUND     Why is the patient in the hospital?: Neoplasm of head of pancreas  Patient has a past medical history of Alzheimer disease, Bronchiectasis, Cataract, Celiac disease, Emphysema of lung, Encounter for blood transfusion, Lung disease, and Short-term memory loss.    ASSESSMENT/INTERVENTIONS     What did you find: Patient very SOB and states " N/V, and Dizzy", on assessment, HR very fast and irregular. Patient not on tele. Dr. Oliver present at bedside    RECOMMENDATIONS     We recommend: LR 1 bolus started, STAT EKG ordered- SVT,PCXR, Stat am labs. Zofran changed to IVP. Dr. Oliver consulted with Cards, patient self converted to SR. Unable to obtain B/P, Doppler presser 62/0.  After pt converted to SR, able to obtain B/P 79/55, Unable to leave pt on floor with B/P in 70's, Critical Care called to come see pt and 2nd bolus ordered. Daughter notified of events and is coming in.     FOLLOW-UP/CONTINGENCY PLAN     Patient " needs a second visit at : after 2nd L bolus    Call the Rapid Response Nurse, Myriam Greene, RN at x [phone] for additional questions or concerns.    PHYSICIAN ESCALATION     Orders received and case discussed with Dr. Alcazar and Dr. Mcadams with [facility].    Disposition: Remain in room 7096.

## 2019-05-03 NOTE — CARE UPDATE
Rapid Response Nurse Chart Check     Chart check completed, abnormal VS noted, bedside RN at bedside contacted, no concerns   verbalized at this time increased HR related to activity, instructed to call [phone] for further concerns or assistance.

## 2019-05-03 NOTE — PROGRESS NOTES
Spoke with IM5 to clarify escalation of care. MD stated team had discussion with daughter; will place order for comfort measures. No other events at this time. Will update primary nurse, Chris.

## 2019-05-03 NOTE — CARE UPDATE
Discussed AES recs with family. No intervention recommended at this time. Discussed continuing abx with a strong focus on comfort and the understanding that the patient may die prior to discharge to hospice.  Family request lab draws.      Nasim Paige M.D.   PGY-3  Pager [phone]

## 2019-05-03 NOTE — SIGNIFICANT EVENT
Paged by nursing at approximately 4:30 AM regarding patient appearing pale and having difficulty getting manual blood pressure. BP at that time 70's/40's on manual. Assessed patient at bedside, patient is a poor historian 2/2 to Alzheimers however does not endorse any chest pain. She had shortness of breath and nausea, and was pale at the bedside. Gave 1L LR bolus. Tachycardic with rate in the 170's on my assessment, STAT ECG ordered which was concerning for SVT with ST depressions. STAT labs drawn.     Patient's rate down to 100 on it's own and patient reported improvement in symptoms. No telemetry on patient at that time, repeat ECG ordered however had gone back in to the 170's at that time and repeat ECG similar to initial. Eventually patient again returned to lower rate while ECG was on which showed NSR. Discussed with cardiology on-call. Suspect arrhythmia is non-cardiac in origin and likely 2/2 underlying disease process. ST depressions demand ischemia 2/2 to increased rate. Recommended metoprolol 25mg bid if patient's BP could tolerate it for control of tachycardia. CXR performed, at bedside no focal consolidation no acute cardiac or pulmonary abnormalities seen. Critical Care was called to the bedside due to hypotension. Recommended further IV hydration and checking lactate.    Called patient's daughter, Ramon who is POA. Informed of situation. Discussed goals of care and prognosis with her. Daughter understanding that patient has very advanced, end-stage disease. Open to discussing palliative care and transition to comfort care. At that time she was on her way to hospital. Informed day-team.     As of now will continue fluid hydration with second liter. Patient with BP's in the 70's/50's at this time. Plan at this time is to start metoprolol 25mg bid if patient's BP responds to reasonable range with more fluids.    Shashank Alcazar MD  Internal Medicine PGY-1  [facility]

## 2019-05-03 NOTE — PLAN OF CARE
Problem: Adult Inpatient Plan of Care  Goal: Plan of Care Review  Outcome: Ongoing (interventions implemented as appropriate)  Patient awake, alert, disoriented to time, ambulates with 1-person assist.  Patient reports no pain or discomfort.  Patient refused full assessment.  Vital signs stable on room air.  No falls or injuries.  Will continue to monitor.

## 2019-05-03 NOTE — ASSESSMENT & PLAN NOTE
Newly diagnosed, with what is likely mets to the liver, Biopsy positive for adenocarcinoma, family wishes to pursue hospice

## 2019-05-03 NOTE — CARE UPDATE
Rapid Response Nurse Chart Check     Chart check completed, abnormal VS noted, pt transitioned to comfort care. Call [phone] for further concerns or assistance.

## 2019-05-03 NOTE — SUBJECTIVE & OBJECTIVE
Interval History: Tachycardiac and hypotensive overnight with some resolution with fluids,  Moving towards comfort discussed with family plans for abx and monitoring     Review of Systems   Unable to perform ROS: Acuity of condition     Objective:     Vital Signs (Most Recent):  Temp: 97.4 °F (36.3 °C) (05/03/19 1609)  Pulse: 89 (05/03/19 1609)  Resp: (!) 24 (05/03/19 1609)  BP: (!) 100/56 (05/03/19 1609)  SpO2: 98 % (05/03/19 1609) Vital Signs (24h Range):  Temp:  [96.8 °F (36 °C)-97.8 °F (36.6 °C)] 97.4 °F (36.3 °C)  Pulse:  [] 89  Resp:  [17-27] 24  SpO2:  [88 %-98 %] 98 %  BP: ()/(47-82) 100/56     Weight: 48.5 kg (107 lb)  Body mass index is 18.95 kg/m².    Intake/Output Summary (Last 24 hours) at 5/3/2019 1728  Last data filed at 5/3/2019 0440  Gross per 24 hour   Intake 100 ml   Output --   Net 100 ml      Physical Exam   Constitutional: She appears well-developed. No distress.   thin   HENT:   Head: Normocephalic and atraumatic.   Eyes: Pupils are equal, round, and reactive to light.   Cardiovascular: Regular rhythm and normal heart sounds. Tachycardia present.   Pulmonary/Chest: Effort normal and breath sounds normal.   Abdominal: Soft. Bowel sounds are normal. She exhibits no distension. There is tenderness (Very mild, RUQ).   Musculoskeletal: She exhibits no edema or tenderness.   Neurological: She is alert.   disoriented to time and situation, but alert to place.   agitated    Skin: She is not diaphoretic. No pallor.   slightly jaundiced   Psychiatric: She has a normal mood and affect.   Vitals reviewed.      Significant Labs: All pertinent labs within the past 24 hours have been reviewed.    Significant Imaging: I have reviewed all pertinent imaging results/findings within the past 24 hours.

## 2019-05-03 NOTE — NURSING
Patient remained in [id number]. MD Alcazar spoke with pt's daughter , aware of POC. Bedside nurse at bedside . Will continue to monitor.

## 2019-05-03 NOTE — PLAN OF CARE
JEY heard from Codi from [facility] that she will speak with Ramon(dtr), she will submit for [employer] auth.    Thao Grant, [facility]  [id number]     05/03/19 1034   Post-Acute Status   Post-Acute Authorization Placement  ([facility])   Post-Acute Placement Status Pending Payor Review

## 2019-05-03 NOTE — PT/OT/SLP PROGRESS
Physical Therapy      Patient Name:  Zeynep Hamm   MRN:  [id number]    Pt not seen on this date d/t Hold per RN request.  RN states pt vitals unstable and not appropriate for PT at this time.  States HR in 140's c minimal activity - rolling in bed c RN; Chart reviewed and pt remain appropriate for PT services at this time.  Will see pt as schedule allows.      Aguilar Mares, PT,DPT  5/3/2019

## 2019-05-03 NOTE — TREATMENT PLAN
AES Follow-up Note    Contacted by primary team regarding uptrend in Tbili in setting of concern for sepsis with hypotension and tachycardia.    Pt seen and family at bedside.  Discussed events with them.  Discussed results with them.    Reviewed chart.    Tbili improved on latest lab trend is 4 <- 5 < - 3    Abd x ray showing metal biliary stent in good place with some appearance of air above stent indicating stent patency.    Labs reviewed.    No endoscopic intervention required at this time.  Would trend labs  Would follow cultures  Continue supportive and goal directed care based on family wishes    Please contact us if LFTs worsening or positive cultures for biliary origin.    Case discussed with family at bedside.  Case discussed with Dr. Flores  Case discussed with primary team.  Please call with any additional concerns /questions    Oliver Prado MD  Gastroenterology Fellow (PGY-VI)  Pager: [phone]

## 2019-05-03 NOTE — PLAN OF CARE
JEY called in [employer] and faxed PASSR asking to expedite 142 due to today's placement.    SW uploaded 142 to [employer].    Thao Grant, \[facility]\""W  [id number]

## 2019-05-03 NOTE — PHYSICIAN QUERY
PT Name: Zeynep Hamm  MR #: [id number]    Physician Query Form - Nutrition Clarification     CDS: Ananth TRIVEDI,RN   Contact information:[phone]    This form is a permanent document in the medical record.     Query Date: May 3, 2019    By submitting this query, we are merely seeking further clarification of documentation.. Please utilize your independent clinical judgment when addressing the question(s) below.    The Medical record contains the following:   Indicators  Supporting Clinical Findings Location in Medical Record   x % of Estimated Energy Intake over a time frame from p.o., TF, or TPN    Energy Intake: <75% of estimated   energy requirement for >1 month       5/2/19 Adult Nutrition Consults     x Weight Status over a time frame Weight Loss: 8% x >3 months     5/2/19 Adult Nutrition Consults   x Subcutaneous Fat and/or Muscle Loss Muscle Mass Depletion: mild and moderate depletion of temples, clavicle region, scapular region and interosseous muscle   Subcutaneous Fat (Malnutrition): mild depletion       5/2/19 Adult Nutrition Consults    Fluid Accumulation or Edema      Reduced  Strength     x Wt / BMI / Usual Body Weight   WT= 107 lb    BMI= 19     5/2/19 Adult Nutrition Consults      x Delayed Wound Healing / Failure to Thrive  87 y.o. Female presenting with generalized fatigue and failure to thrive        4/26/19 ED Provider Notes   x Acute or Chronic Illness  Neoplasm of head of pancreas, acute cholangitis, Metastasis to liver, AMS,  Celiac disease, Dementia in Alzheimer's disease    Moderate Protein Calorie Malnutrition  5/2/19 Hospital Medicine Progress Notes         5/2/19 Adult Nutrition Consults    Medication     x Treatment 1. Continue regular diet-add gluten free diet restriction   2. Add boost plus TID   Current Diet Order: Regular diet   Nutrition Order Comments: Gluten free diet   Oral Nutrition Supplement: add boost plus TID            5/2/19 Adult Nutrition Consults     Other       AND / ASPEN Clinical Characteristics (October 2011)  A minimum of two characteristics is recommended for diagnosing either moderate or severe malnutrition   Mild Malnutrition Moderate Malnutrition Severe Malnutrition   Energy Intake from p.o., TF or TPN. < 75% intake of estimated energy needs for less than 7 days < 75% intake of estimated energy needs for greater than 7 days < 50% intake of estimated energy needs for > 5 days   Weight Loss 1-2% in 1 month  5% in 3 months  7.5% in 6 months  10% in 1 year 1-2 % in 1 week  5% in 1 month  7.5% in 3 months  10% in 6 months  20% in 1 year > 2% in 1 week  > 5% in 1 month  > 7.5% in 3 months  > 10% in 6 months  > 20% in 1 year   Physical Findings     None *Mild subcutaneous fat and/or muscle loss  *Mild fluid accumulation  *Stage II decubitus  *Surgical wound or non-healing wound *Mod/severe subcutaneous fat and/or muscle loss  *Mod/severe fluid accumulation  *Stage III or IV decubitus  *Non-healing surgical wound     Provider, please specify diagnosis or diagnoses associated with above clinical findings.    [  ] Moderate Protein-Calorie Malnutrition   [  ] Severe Protein-Calorie Malnutrition   [  ] Other Nutritional Diagnosis (please specify):    [ x ] Clinically Undetermined       Please document in your progress notes daily for the duration of treatment until resolved and include in your discharge summary.

## 2019-05-03 NOTE — PROGRESS NOTES
[facility] Hospital Medicine  Progress Note    Patient Name: Zeynep Hamm  MRN: [id number]  Patient Class: IP- Inpatient   Admission Date: 4/26/2019  Length of Stay: 7 days  Attending Physician: Michelle Nunez MD  Primary Care Provider: Maya Kelley MD    [facility] Medicine Team: [facility] HOSP MED 5 Nasim Paige MD    Subjective:     Principal Problem:Neoplasm of head of pancreas    HPI:  87 years old female with past medical history of Alzheimer, COPD, Cataract, Celiac disease, Diastolic HF (EF 45%) presented to ED with fatigue.  Per patient daughter, she has been increasingly getting weaker over the last 3 weeks to a point where she is unable to stand up today and that's when the daughter decided to bring the patient to ED, at baseline patient able to walk short distance, she also has been sleeping more, eat less than usual with gradual wieght loss over coupleof weeks, noticed to have yellowish skin discoloration today, otherwise has no fever, chills, chest pain, shortness of breath, abdominal pain, nausea, vomiting, diarrhea and urinary symptoms. Patient quit smoking 35 years ago, doesn't drink alcohol, no recent surgeries or travel, has family history of leukemia and mediastinal mass.She was admitted to the hospital recently on 4/18/2019 with COPD exacerbation and new onset systolic congestive heart failure, started on lasix.  At ED patient patient was hypotensive and tachycardic, labs significant for elevated WBC, LFTs and bilirubin, U/S abdomin showed head of pancrease mass with extra and intrahepatic biliary ductal dilatation and hepatic lesions.        Hospital Course:  Admitted for suspect cholangitis. ERCP done with liver met cytology pending.      Interval History: Tachycardiac and hypotensive overnight with some resolution with fluids,  Moving towards comfort discussed with family plans for abx and monitoring     Review of Systems   Unable to perform ROS: Acuity of condition      Objective:     Vital Signs (Most Recent):  Temp: 97.4 °F (36.3 °C) (05/03/19 1609)  Pulse: 89 (05/03/19 1609)  Resp: (!) 24 (05/03/19 1609)  BP: (!) 100/56 (05/03/19 1609)  SpO2: 98 % (05/03/19 1609) Vital Signs (24h Range):  Temp:  [96.8 °F (36 °C)-97.8 °F (36.6 °C)] 97.4 °F (36.3 °C)  Pulse:  [] 89  Resp:  [17-27] 24  SpO2:  [88 %-98 %] 98 %  BP: ()/(47-82) 100/56     Weight: 48.5 kg (107 lb)  Body mass index is 18.95 kg/m².    Intake/Output Summary (Last 24 hours) at 5/3/2019 1728  Last data filed at 5/3/2019 0440  Gross per 24 hour   Intake 100 ml   Output --   Net 100 ml      Physical Exam   Constitutional: She appears well-developed. No distress.   thin   HENT:   Head: Normocephalic and atraumatic.   Eyes: Pupils are equal, round, and reactive to light.   Cardiovascular: Regular rhythm and normal heart sounds. Tachycardia present.   Pulmonary/Chest: Effort normal and breath sounds normal.   Abdominal: Soft. Bowel sounds are normal. She exhibits no distension. There is tenderness (Very mild, RUQ).   Musculoskeletal: She exhibits no edema or tenderness.   Neurological: She is alert.   disoriented to time and situation, but alert to place.   agitated    Skin: She is not diaphoretic. No pallor.   slightly jaundiced   Psychiatric: She has a normal mood and affect.   Vitals reviewed.      Significant Labs: All pertinent labs within the past 24 hours have been reviewed.    Significant Imaging: I have reviewed all pertinent imaging results/findings within the past 24 hours.    Assessment/Plan:      * Neoplasm of head of pancreas  Newly diagnosed, with what is likely mets to the liver, Biopsy positive for adenocarcinoma, family wishes to pursue hospice       Comfort measures only status  Discussed with family plans for comfort,  After patient improvement they are ok with limited interventions such as abx which we will continue for now.         Acute cholangitis   S/p ERCP  -Previously resolved, and  transition to abx with acute worsening, concern for worsening infection however labs are not suggestive, continue vanc and zosyn      DNR (do not resuscitate)        Palliative care encounter  Appreciate assistance  -dispo to [facility] pending cytology     Metastasis to liver  S/p bx pending cytology results    Altered mental status, unspecified  Daughter provides most of information    Biliary obstruction  T bili trendign down    Goals of care, counseling/discussion  Made DNR, nearing comfort care.  Expect discharge to hospice         COPD (chronic obstructive pulmonary disease)  Stable Patient on Combo ICS LABA and LABA LAMA outpatient   - continue ICS, LABA, Added LAMA (spiriva),   - breathing treatment as needed        Congestive heart failure (CHF)  Borderline EF with signs of diastolic dysfunction  - Holding BB given severe COPD  - BP control to help reduce risk of flash Pulm edema.            Dementia in Alzheimer's disease  Patient mental status at baseline    Plan:  - Delirium precautions               VTE Risk Mitigation (From admission, onward)        Ordered     IP VTE HIGH RISK PATIENT  Once      04/26/19 1926     Place CAT hose  Until discontinued      04/26/19 1845     Place sequential compression device  Until discontinued      04/26/19 1845        Dispo: to hospice in AM, near comfort care, continuing abx at request of family and given improvement with resuscitation       Nasim Paige MD  Department of Hospital Medicine   [facility]

## 2019-05-03 NOTE — PROGRESS NOTES
[facility]  Palliative Medicine  Progress Note    Patient Name: Zeynep Hamm  MRN: [id number]  Admission Date: 4/26/2019  Hospital Length of Stay: 6 days  Code Status: DNR   Attending Provider: Michelle Nunez MD  Consulting Provider: DEANDRE Manzanares, CNS  Primary Care Physician: Maya Kelley MD  Principal Problem:Neoplasm of head of pancreas    Patient information was obtained from patient, relative(s) and past medical records.      Assessment/Plan:     Palliative care encounter  Impression: 87-year-old female with PMH of Alzheimer dementia, CHF with positive recent CT of the abdomen for large pancreatic head mass concerning for primary pancreatic malignancy and innumerable large liver lesions concerning for metastatic disease. Tissue was obtained for pathology from lesion in the left lobe of the liver.  Oncology consulted. Pathology returned postive for Pancreatic cancer..    Patient has declining performance status. Short term memory loss with intermitent confusion secondary to dementia.  Patient is aware of her basic clinical status but pts'dght is awaiting final pathology and / or more information from oncology to discuss thoroughly with her mother.  Patient's daughter has excellent understanding and insight.  Patient's daughter has decided to do SNF in nursing home and [facility] her [facility].  Patients daughter states she wants her mother to have the opportunity to have some time to strengthen herself prior to returning to her home so that her brother may have easier time caring for her.     Recommendations/Plan  1) Nursing Home SNF -  Pt's daughter has chosen [facility].  Awaiting acceptance  2) Care management aware and coordinating discharge plan   3) Consider possible home hospice or nursing home with hospice if patient declines  at SNF.      Goals of Care  Follow-up discussion conducted by this APRN with the pt's daughter to discuss pt's current clinical  status, and goals of care.  She stated that her mother has never wanted to in a facility but she feels this maybe the best first step.  [place] of [place] family lives her in [place] and is concerned about them having access to visit her mother.  She knows her brother could not manage her mother at home at this point.  So she feels SNF nursing home for strengthening is the best option for her mother at this time so that she may return to her home  She is aware of the final pathology results and is at peace with her decisions pertaining her mothers post hospital care           I will sign off. Please contact us if you have any additional questions.    Subjective:     Chief Complaint:   Chief Complaint   Patient presents with    Fatigue     generalized weakness with not eating since hospital discharge last wee.        HPI:   87-year-old female with PMH of Alzheimer dementia, CHF presented to the ED for progressively worsening weakness, poor appetite and jaundice for the last 2 weeks.  CT of the abdomen revealed large pancreatic head mass concerning for primary pancreatic malignancy.  There were innumerable large hypoattenuating liver lesions concerning for metastatic disease. Her total bilirubin is worsening and a ERCP done today with metal stent was placed into the CBD.  EUS done, tissue was obtained for pathology from lesion in the left lobe of the liver.  Oncology consulted. Awaiting final pathology report.  Oncology recommended palliative care consult.  Prior to this admission the patient with recent admit on April 18th for COPD exacerbation and was found to have new onset CHF and discharged on Lasix.  However for the past 2 weeks patient continued to become more weak and was unable to ambulate at home.  At baseline patient was able to do her activities of daily living.          Hospital Course:  No notes on file      Past Medical History:   Diagnosis Date     Alzheimer disease     Bronchiectasis     Cataract     both eyes    Celiac disease     Emphysema of lung     Encounter for blood transfusion     Lung disease     Short-term memory loss        Past Surgical History:   Procedure Laterality Date    APPENDECTOMY      CATARACT EXTRACTION W/  INTRAOCULAR LENS IMPLANT Right 4/14/14    CATARACT EXTRACTION W/  INTRAOCULAR LENS IMPLANT Left 5/12/14    ERCP (ENDOSCOPIC RETROGRADE CHOLANGIOPANCREATOGRAPHY) N/A 4/29/2019    Performed by Emery Flores MD at [facility]O (2ND FLR)    EXCISION-MASS Left 10/31/2013    Performed by SAL Morgan III, MD at [facility] OR 2ND FLR    EXCISION-SOFT TISSUE - right shin calcium deposits Right 8/19/2015    Performed by SAL Morgan III, MD at [facility] OR 2ND FLR    INSERTION, IOL PROSTHESIS Left 5/12/2014    Performed by [facility] at [facility] OR    INSERTION, IOL PROSTHESIS Right 4/14/2014    Performed by [facility] Flores MD at [facility] OR    PHACOEMULSIFICATION, CATARACT Left 5/12/2014    Performed by [facility] Flores MD at [facility] OR    PHACOEMULSIFICATION, CATARACT Right 4/14/2014    Performed by [facility] Flores MD at [facility] OR    TONSILLECTOMY      ULTRASOUND, UPPER GI TRACT, ENDOSCOPIC N/A 4/29/2019    Performed by Emery Flores MD at [facility] END[facility] (2ND FLR)    uterine suspension         Review of patient's allergies indicates:   Allergen Reactions    Gluten protein        Medications:  Continuous Infusions:  Scheduled Meds:   ciprofloxacin HCl  500 mg Oral Q12H    enoxaparin  40 mg Subcutaneous Daily    fluticasone furoate-vilanterol  1 puff Inhalation Daily    metroNIDAZOLE  500 mg Oral Q8H    tiotropium  1 capsule Inhalation Daily     PRN Meds:acetaminophen, albuterol-ipratropium, dextrose 50%, dextrose 50%, glucagon (human recombinant), glucose, glucose, ondansetron, sodium chloride 0.9%    Family History     Problem Relation (Age of Onset)    COPD Brother    Cancer Mother, Father    Glaucoma Sister    Heart disease Sister         Tobacco Use    Smoking status: Former Smoker     Packs/day: 2.00     Years: 35.00     Pack years: 70.00     Types: Cigarettes     Last attempt to quit: 1988     Years since quittin.9    Smokeless tobacco: Never Used   Substance and Sexual Activity    Alcohol use: No     Alcohol/week: 0.0 oz     Comment: twice a year on special occassions    Drug use: No    Sexual activity: Not Currently     Partners: Male       Review of Systems   Constitutional: Positive for activity change, appetite change and fatigue.   Respiratory: Negative for cough and shortness of breath.    Cardiovascular: Negative for chest pain and leg swelling.   Gastrointestinal: Negative for abdominal distention, abdominal pain, nausea and vomiting.   Skin: Positive for color change and pallor.     Objective:     Vital Signs (Most Recent):  Temp: 97.9 °F (36.6 °C) (19)  Pulse: 81 (19)  Resp: 14 (19)  BP: 120/69 (19)  SpO2: (!) 92 % (19) Vital Signs (24h Range):  Temp:  [96.3 °F (35.7 °C)-98.2 °F (36.8 °C)] 97.9 °F (36.6 °C)  Pulse:  [75-89] 81  Resp:  [4-18] 14  SpO2:  [92 %-93 %] 92 %  BP: (105-120)/(60-69) 120/69     Weight: 48.5 kg (107 lb)  Body mass index is 18.95 kg/m².    Review of Symptoms  Symptom Assessment (ESAS 0-10 scale)   ESAS 0 1 2 3 4 5 6 7 8 9 10   Pain x             Dyspnea x             Anxiety x             Nausea x             Depression               Anorexia    x          Fatigue       x       Insomnia x             Restlessness  x             Agitation x             CAM / Delirium : [place], Dementia - short term memory loss  Constipation     _x_ --  ___+   Diarrhea           _x_ --  ___+  Bowel Management Plan (BMP): No    Comments: Patient was able to report symptoms but rating was difficult    OME in 24 hours: 0    Performance Status: 40    ECOG Performance Status thGthrthathdtheth:th th4th Physical Exam   Constitutional: She appears well-developed. She appears  ill.   Thin    HENT:   Head: Normocephalic and atraumatic.   Eyes: Scleral icterus is present.   Neck: Normal range of motion.   Cardiovascular: Intact distal pulses and normal pulses.  Occasional extrasystoles are present. Bradycardia present.   Pulmonary/Chest: Breath sounds normal.   Abdominal: Soft. Bowel sounds are normal. There is no tenderness. There is no guarding.   Neurological: She is alert.   Skin: Skin is warm and dry.   Psychiatric: Her behavior is normal. Thought content normal.     CBC:   Recent Labs   Lab 05/02/19 0437   WBC 15.79*   HGB 10.0*   HCT 30.1*   MCV 85        BMP:  Recent Labs   Lab 05/02/19 0437   *      K 3.3*      CO2 25   BUN 11   CREATININE 0.6   CALCIUM 8.0*     LFT:  Lab Results   Component Value Date    AST 46 (H) 05/02/2019    ALKPHOS 182 (H) 05/02/2019    BILITOT 3.2 (H) 05/02/2019     Albumin:   Albumin   Date Value Ref Range Status   05/02/2019 1.7 (L) 3.5 - 5.2 g/dL Final     Protein:   Total Protein   Date Value Ref Range Status   05/02/2019 4.9 (L) 6.0 - 8.4 g/dL Final     Lactic acid:   Lab Results   Component Value Date    LACTATE 1.7 04/26/2019    LACTATE 2.2 04/18/2019     Advance Care Planning   Advanced Directives::  Living Will: No  LaPOST: No  Do Not Resuscitate Status: Yes  Medical Power of : Yes./Not on chart Agent's Name:Ramon Torrez  Agent's Contact Number [phone]    Decision-Making Capacity: Patient answered questions, Family answered questions     Living Arrangements: Lives with her son but daughter is primary caregiver    Psychosocial/Cultural: Patient has short term memory loss.  She is  with 3 adult children who are actively involved in her care.  She lives with her son but her daughter is her main caregiver.  Her daughter is a retired RN.       Spiritual:     F- Donna and Belief: Orthodox    I - Importance: somewhat to patient.  More important to daughter  .  C - Community: not active    A - Address in Care:   visits      > 50% of 20 min visit spent in chart review, face to face discussion of goals of care,  symptom assessment, coordination of care and emotional support.    DEANDRE Manzanares, CNS  Palliative Medicine  [facility]

## 2019-05-03 NOTE — NURSING
Approx 0430 patient reported shortness of breath.  Patient tachypnic, appeared pale.  Could not get blood pressure via vital signs machine, was difficult to auscultate manually - recorded as 76/50.  HR 160s according to vital signs machine.  Rapid Response Nurse Myriam and IM5 / Dr. Alcazar called to bedside.  1L bolus LR ordered and given, CXR and 12-lead EKG ordered.  Patient in SVT per first 12-lead EKG.  Patient converted to sinus tach at approx 600, remained monitored until 0700.

## 2019-05-03 NOTE — PROGRESS NOTES
Pharmacokinetic Initial Assessment: IV Vancomycin    Assessment/Plan:    Initiate intravenous vancomycin with vancomycin 750mg IV every 24 hours  Desired empiric serum trough concentration is 10 to 20 mcg/mL.  Draw vancomycin trough level 30 min prior to third dose on 05/05 at approximately 1430   Pharmacy will continue to follow and monitor vancomycin.      Please contact pharmacy at extension [phone] with any questions regarding this assessment.     Thank you for the consult,   Anuj CHERY Osorio     Patient brief summary:  Zeynep Hamm is a 87 y.o. female initiated on antimicrobial therapy with IV Vancomycin for treatment of suspected intra-abdominal    Drug Allergies:   Review of patient's allergies indicates:   Allergen Reactions    Gluten protein        Actual Body Weight:   48.5kg    Renal Function:   Estimated Creatinine Clearance: 33.7 mL/min (based on SCr of 0.9 mg/dL).,     Dialysis Method (if applicable):  N/A     CBC (last 72 hours):  Recent Labs   Lab Result Units 05/01/19  0533 05/02/19  0437 05/03/19  0355 05/03/19  0517   WBC K/uL 13.75* 15.79* 20.15* 18.70*   Hemoglobin g/dL 10.7* 10.0* 10.3* 10.0*   Hematocrit % 32.3* 30.1* 29.8* 29.2*   Platelets K/uL 240 198 124* 113*   Gran% % 78.9* 83.1* 74.3* 76.2*   Lymph% % 9.4* 6.8* 12.5* 11.8*   Mono% % 8.1 7.2 8.3 7.3   Eosinophil% % 1.9 1.5 1.0 0.9   Basophil% % 0.5 0.3 0.7 0.5   Differential Method  Automated Automated Automated Automated       Metabolic Panel (last 72 hours):  Recent Labs   Lab Result Units 05/01/19  0533 05/02/19  0437 05/03/19  0355 05/03/19  0517 05/03/19  1210   Sodium mmol/L 139 136 137 137 136   Potassium mmol/L 3.9 3.3* 5.1 4.6 5.2*   Chloride mmol/L 106 104 109 108 106   CO2 mmol/L 26 25 21* 20* 23   Glucose mg/dL 90 112* 117* 170* 139*   BUN, Bld mg/dL 14 11 15 16 19   Creatinine mg/dL 0.7 0.6 0.7 0.8 0.9   Albumin g/dL 1.8* 1.7* 1.9* 1.9* 2.1*   Total Bilirubin mg/dL 3.6* 3.2* 5.1* 5.1* 4.2*   Alkaline Phosphatase U/L 205*  182* 180* 189* 206*   AST U/L 56* 46* 63* 64* 89*   ALT U/L 41 35 35 33 39       Drug levels (last 3 results):  No results for input(s): VANCOMYCINRA, VANCOMYCINPE, VANCOMYCINTR in the last 72 hours.    Microbiologic Results:  Microbiology Results (last 7 days)       Procedure Component Value Units Date/Time    Blood culture [[id number]] Collected:  05/03/19 1210    Order Status:  Sent Specimen:  Blood Updated:  05/03/19 1221    Blood culture [[id number]] Collected:  05/03/19 1211    Order Status:  Sent Specimen:  Blood Updated:  05/03/19 1221    Blood Culture #1 **CANNOT BE ORDERED STAT** [[id number]] Collected:  04/26/19 1751    Order Status:  Completed Specimen:  Blood from Peripheral, Forearm, Left Updated:  05/01/19 2012     Blood Culture, Routine No growth after 5 days.    Blood culture [[id number]] Collected:  04/26/19 1805    Order Status:  Completed Specimen:  Blood from Peripheral, Antecubital, Right Updated:  05/01/19 2012     Blood Culture, Routine No growth after 5 days.

## 2019-05-03 NOTE — CARE UPDATE
Discussed with family with transition to comfort care.  If patient improves will discuss possible home with hospice given patient's previous wishes.       Nasim Paige M.D.   PGY-3  Pager [phone]

## 2019-05-03 NOTE — CARE UPDATE
Revisited family to discuss lab findings.  Patient's tachycardia has improved and she is now comfortable.  I explained my concern for infection.  They are ok with abx but are hesitant to obtain lab draws.  They are ok with a focused workup, while keeping the patient's comfort in mind.  I discussed my plans with them to review her lab findings with AES and to keep her NPO.      Nasim Paige M.D.   PGY-3  Pager [phone]

## 2019-05-03 NOTE — ASSESSMENT & PLAN NOTE
Discussed with family plans for comfort,  After patient improvement they are ok with limited interventions such as abx which we will continue for now.

## 2019-05-04 LAB
ALBUMIN SERPL BCP-MCNC: 1.7 G/DL (ref 3.5–5.2)
ALBUMIN SERPL BCP-MCNC: 1.9 G/DL (ref 3.5–5.2)
ALP SERPL-CCNC: 171 U/L (ref 55–135)
ALP SERPL-CCNC: 199 U/L (ref 55–135)
ALT SERPL W/O P-5'-P-CCNC: 39 U/L (ref 10–44)
ALT SERPL W/O P-5'-P-CCNC: 47 U/L (ref 10–44)
ANION GAP SERPL CALC-SCNC: 7 MMOL/L (ref 8–16)
ANION GAP SERPL CALC-SCNC: 7 MMOL/L (ref 8–16)
AST SERPL-CCNC: 141 U/L (ref 10–40)
AST SERPL-CCNC: 97 U/L (ref 10–40)
BACTERIA #/AREA URNS AUTO: NORMAL /HPF
BASOPHILS # BLD AUTO: 0.1 K/UL (ref 0–0.2)
BASOPHILS NFR BLD: 0.5 % (ref 0–1.9)
BILIRUB SERPL-MCNC: 3.1 MG/DL (ref 0.1–1)
BILIRUB SERPL-MCNC: 3.6 MG/DL (ref 0.1–1)
BILIRUB UR QL STRIP: NEGATIVE
BUN SERPL-MCNC: 25 MG/DL (ref 8–23)
BUN SERPL-MCNC: 25 MG/DL (ref 8–23)
CALCIUM SERPL-MCNC: 6.8 MG/DL (ref 8.7–10.5)
CALCIUM SERPL-MCNC: 8.2 MG/DL (ref 8.7–10.5)
CHLORIDE SERPL-SCNC: 109 MMOL/L (ref 95–110)
CHLORIDE SERPL-SCNC: 115 MMOL/L (ref 95–110)
CLARITY UR REFRACT.AUTO: CLEAR
CO2 SERPL-SCNC: 19 MMOL/L (ref 23–29)
CO2 SERPL-SCNC: 23 MMOL/L (ref 23–29)
COLOR UR AUTO: YELLOW
CREAT SERPL-MCNC: 1.2 MG/DL (ref 0.5–1.4)
CREAT SERPL-MCNC: 1.4 MG/DL (ref 0.5–1.4)
DIFFERENTIAL METHOD: ABNORMAL
EOSINOPHIL # BLD AUTO: 0.1 K/UL (ref 0–0.5)
EOSINOPHIL NFR BLD: 0.4 % (ref 0–8)
ERYTHROCYTE [DISTWIDTH] IN BLOOD BY AUTOMATED COUNT: 18.8 % (ref 11.5–14.5)
EST. GFR  (AFRICAN AMERICAN): 39 ML/MIN/1.73 M^2
EST. GFR  (AFRICAN AMERICAN): 47 ML/MIN/1.73 M^2
EST. GFR  (NON AFRICAN AMERICAN): 33.8 ML/MIN/1.73 M^2
EST. GFR  (NON AFRICAN AMERICAN): 40.7 ML/MIN/1.73 M^2
GLUCOSE SERPL-MCNC: 108 MG/DL (ref 70–110)
GLUCOSE SERPL-MCNC: 111 MG/DL (ref 70–110)
GLUCOSE UR QL STRIP: NEGATIVE
HCT VFR BLD AUTO: 29 % (ref 37–48.5)
HGB BLD-MCNC: 9.9 G/DL (ref 12–16)
HGB UR QL STRIP: ABNORMAL
IMM GRANULOCYTES # BLD AUTO: 0.52 K/UL (ref 0–0.04)
IMM GRANULOCYTES NFR BLD AUTO: 2.4 % (ref 0–0.5)
KETONES UR QL STRIP: NEGATIVE
LEUKOCYTE ESTERASE UR QL STRIP: ABNORMAL
LYMPHOCYTES # BLD AUTO: 1.4 K/UL (ref 1–4.8)
LYMPHOCYTES NFR BLD: 6.3 % (ref 18–48)
MAGNESIUM SERPL-MCNC: 1.5 MG/DL (ref 1.6–2.6)
MCH RBC QN AUTO: 28.7 PG (ref 27–31)
MCHC RBC AUTO-ENTMCNC: 34.1 G/DL (ref 32–36)
MCV RBC AUTO: 84 FL (ref 82–98)
MICROSCOPIC COMMENT: NORMAL
MONOCYTES # BLD AUTO: 1.5 K/UL (ref 0.3–1)
MONOCYTES NFR BLD: 6.8 % (ref 4–15)
NEUTROPHILS # BLD AUTO: 18.3 K/UL (ref 1.8–7.7)
NEUTROPHILS NFR BLD: 83.6 % (ref 38–73)
NITRITE UR QL STRIP: NEGATIVE
NRBC BLD-RTO: 0 /100 WBC
PH UR STRIP: 5 [PH] (ref 5–8)
PHOSPHATE SERPL-MCNC: 3.2 MG/DL (ref 2.7–4.5)
PLATELET # BLD AUTO: 104 K/UL (ref 150–350)
PMV BLD AUTO: ABNORMAL FL (ref 9.2–12.9)
POCT GLUCOSE: 172 MG/DL (ref 70–110)
POTASSIUM SERPL-SCNC: 4.2 MMOL/L (ref 3.5–5.1)
POTASSIUM SERPL-SCNC: 5.5 MMOL/L (ref 3.5–5.1)
PROT SERPL-MCNC: 4.5 G/DL (ref 6–8.4)
PROT SERPL-MCNC: 5.1 G/DL (ref 6–8.4)
PROT UR QL STRIP: NEGATIVE
RBC # BLD AUTO: 3.45 M/UL (ref 4–5.4)
RBC #/AREA URNS AUTO: 2 /HPF (ref 0–4)
SODIUM SERPL-SCNC: 139 MMOL/L (ref 136–145)
SODIUM SERPL-SCNC: 141 MMOL/L (ref 136–145)
SP GR UR STRIP: 1.01 (ref 1–1.03)
SQUAMOUS #/AREA URNS AUTO: 0 /HPF
URATE SERPL-MCNC: 4.8 MG/DL (ref 2.4–5.7)
URN SPEC COLLECT METH UR: ABNORMAL
WBC # BLD AUTO: 21.88 K/UL (ref 3.9–12.7)
WBC #/AREA URNS AUTO: 3 /HPF (ref 0–5)

## 2019-05-04 PROCEDURE — 84100 ASSAY OF PHOSPHORUS: CPT | Mod: HCNC

## 2019-05-04 PROCEDURE — 63600175 PHARM REV CODE 636 W HCPCS: Mod: HCNC | Performed by: STUDENT IN AN ORGANIZED HEALTH CARE EDUCATION/TRAINING PROGRAM

## 2019-05-04 PROCEDURE — 81001 URINALYSIS AUTO W/SCOPE: CPT | Mod: HCNC

## 2019-05-04 PROCEDURE — 80053 COMPREHEN METABOLIC PANEL: CPT | Mod: HCNC

## 2019-05-04 PROCEDURE — 99233 PR SUBSEQUENT HOSPITAL CARE,LEVL III: ICD-10-PCS | Mod: HCNC,GC,, | Performed by: INTERNAL MEDICINE

## 2019-05-04 PROCEDURE — 99233 SBSQ HOSP IP/OBS HIGH 50: CPT | Mod: HCNC,GC,, | Performed by: INTERNAL MEDICINE

## 2019-05-04 PROCEDURE — 85025 COMPLETE CBC W/AUTO DIFF WBC: CPT | Mod: HCNC

## 2019-05-04 PROCEDURE — 11000001 HC ACUTE MED/SURG PRIVATE ROOM: Mod: HCNC

## 2019-05-04 PROCEDURE — 36415 COLL VENOUS BLD VENIPUNCTURE: CPT | Mod: HCNC

## 2019-05-04 PROCEDURE — 80053 COMPREHEN METABOLIC PANEL: CPT | Mod: 91,HCNC

## 2019-05-04 PROCEDURE — 25000003 PHARM REV CODE 250: Mod: HCNC | Performed by: STUDENT IN AN ORGANIZED HEALTH CARE EDUCATION/TRAINING PROGRAM

## 2019-05-04 PROCEDURE — 84550 ASSAY OF BLOOD/URIC ACID: CPT | Mod: HCNC

## 2019-05-04 PROCEDURE — 83735 ASSAY OF MAGNESIUM: CPT | Mod: HCNC

## 2019-05-04 RX ORDER — FUROSEMIDE 10 MG/ML
20 INJECTION INTRAMUSCULAR; INTRAVENOUS ONCE
Status: DISCONTINUED | OUTPATIENT
Start: 2019-05-04 | End: 2019-05-04

## 2019-05-04 RX ORDER — MAGNESIUM SULFATE HEPTAHYDRATE 40 MG/ML
2 INJECTION, SOLUTION INTRAVENOUS ONCE
Status: COMPLETED | OUTPATIENT
Start: 2019-05-04 | End: 2019-05-04

## 2019-05-04 RX ORDER — FUROSEMIDE 10 MG/ML
20 INJECTION INTRAMUSCULAR; INTRAVENOUS ONCE
Status: COMPLETED | OUTPATIENT
Start: 2019-05-04 | End: 2019-05-04

## 2019-05-04 RX ORDER — SODIUM POLYSTYRENE SULFONATE 4.1 MEQ/G
15 POWDER, FOR SUSPENSION ORAL; RECTAL ONCE
Status: DISCONTINUED | OUTPATIENT
Start: 2019-05-04 | End: 2019-05-04

## 2019-05-04 RX ADMIN — MAGNESIUM SULFATE IN WATER 2 G: 40 INJECTION, SOLUTION INTRAVENOUS at 04:05

## 2019-05-04 RX ADMIN — PIPERACILLIN AND TAZOBACTAM 4.5 G: 4; .5 INJECTION, POWDER, LYOPHILIZED, FOR SOLUTION INTRAVENOUS; PARENTERAL at 01:05

## 2019-05-04 RX ADMIN — PIPERACILLIN AND TAZOBACTAM 4.5 G: 4; .5 INJECTION, POWDER, LYOPHILIZED, FOR SOLUTION INTRAVENOUS; PARENTERAL at 04:05

## 2019-05-04 RX ADMIN — SODIUM CHLORIDE 500 ML: 0.9 INJECTION, SOLUTION INTRAVENOUS at 01:05

## 2019-05-04 RX ADMIN — PIPERACILLIN AND TAZOBACTAM 4.5 G: 4; .5 INJECTION, POWDER, LYOPHILIZED, FOR SOLUTION INTRAVENOUS; PARENTERAL at 09:05

## 2019-05-04 RX ADMIN — SODIUM CHLORIDE 500 ML: 0.9 INJECTION, SOLUTION INTRAVENOUS at 11:05

## 2019-05-04 RX ADMIN — VANCOMYCIN HYDROCHLORIDE 750 MG: 750 INJECTION, POWDER, LYOPHILIZED, FOR SOLUTION INTRAVENOUS at 05:05

## 2019-05-04 RX ADMIN — FUROSEMIDE 20 MG: 10 INJECTION, SOLUTION INTRAMUSCULAR; INTRAVENOUS at 01:05

## 2019-05-04 NOTE — ASSESSMENT & PLAN NOTE
CT abdomen & U/s show   - A mass was identified in the pancreatic head. This was staged T3 Nx M1 (based on metastasis to liver and pending cytologic confirmation).Cytology showing stage IV pancreatic adenocarcinoma   -onc consulted, appreciate recs & planning outpatient management  CA 19-9 >60K

## 2019-05-04 NOTE — PROGRESS NOTES
[facility] Hospital Medicine  Progress Note    Patient Name: Zeynep Hamm  MRN: [id number]  Patient Class: IP- Inpatient   Admission Date: 4/26/2019  Length of Stay: 8 days  Attending Physician: Michelle Nunez MD  Primary Care Provider: Maya Kelley MD    [facility] Medicine Team: [facility] HOSP MED 5 Anitra Michaud MD    Subjective:     Principal Problem:Neoplasm of head of pancreas    HPI:  87 years old female with past medical history of Alzheimer, COPD, Cataract, Celiac disease, Diastolic HF (EF 45%) presented to ED with fatigue.  Per patient daughter, she has been increasingly getting weaker over the last 3 weeks to a point where she is unable to stand up today and that's when the daughter decided to bring the patient to ED, at baseline patient able to walk short distance, she also has been sleeping more, eat less than usual with gradual wieght loss over coupleof weeks, noticed to have yellowish skin discoloration today, otherwise has no fever, chills, chest pain, shortness of breath, abdominal pain, nausea, vomiting, diarrhea and urinary symptoms. Patient quit smoking 35 years ago, doesn't drink alcohol, no recent surgeries or travel, has family history of leukemia and mediastinal mass.She was admitted to the hospital recently on 4/18/2019 with COPD exacerbation and new onset systolic congestive heart failure, started on lasix.  At ED patient patient was hypotensive and tachycardic, labs significant for elevated WBC, LFTs and bilirubin, U/S abdomin showed head of pancrease mass with extra and intrahepatic biliary ductal dilatation and hepatic lesions.        Hospital Course:  Admitted for suspect cholangitis. ERCP done with liver met cytology revealing Stage 4 pancreatic adenocarcinoma.  She remains afebrile with intermittent hypotension requiring fluid boluses. Bsabx initiated when she was hypotensive overnight with Leukocytosis. CT abdomen pending to evaluate for source of infection.      Interval History:  She continues to have decreased oral intake. Moving towards comfort discussed with family plans for abx and monitoring. Still having leukocytosis, CT scan pending to evaluate for possible abscess after her AES procedure.     Review of Systems   Unable to perform ROS: Acuity of condition     Objective:     Vital Signs (Most Recent):  Temp: 98.1 °F (36.7 °C) (05/04/19 1205)  Pulse: 90 (05/04/19 1205)  Resp: 16 (05/04/19 1205)  BP: (!) 98/54 (05/04/19 1205)  SpO2: 98 % (05/04/19 1205) Vital Signs (24h Range):  Temp:  [97.4 °F (36.3 °C)-98.9 °F (37.2 °C)] 98.1 °F (36.7 °C)  Pulse:  [87-96] 90  Resp:  [16-24] 16  SpO2:  [95 %-98 %] 98 %  BP: ()/(54-63) 98/54     Weight: 48.5 kg (107 lb)  Body mass index is 18.95 kg/m².    Intake/Output Summary (Last 24 hours) at 5/4/2019 1521  Last data filed at 5/4/2019 1042  Gross per 24 hour   Intake 100 ml   Output --   Net 100 ml      Physical Exam   Constitutional: She appears well-developed. No distress.   thin   HENT:   Head: Normocephalic and atraumatic.   Eyes: Pupils are equal, round, and reactive to light.   Cardiovascular: Regular rhythm and normal heart sounds. Tachycardia present.   Pulmonary/Chest: Effort normal and breath sounds normal.   Abdominal: Soft. Bowel sounds are normal. She exhibits no distension. There is tenderness (Very mild, RUQ).   Musculoskeletal: She exhibits no edema or tenderness.   Neurological: She is alert.   disoriented to time and situation, but alert to place.   agitated    Skin: She is not diaphoretic. No pallor.   slightly jaundiced   Psychiatric: She has a normal mood and affect.   Vitals reviewed.      Significant Labs: All pertinent labs within the past 24 hours have been reviewed.    Significant Imaging: I have reviewed all pertinent imaging results/findings within the past 24 hours.    Assessment/Plan:      * Neoplasm of head of pancreas  Newly diagnosed, with what is likely mets to the liver, Biopsy positive  for adenocarcinoma, family wishes to pursue hospice       Comfort measures only status  Discussed with family plans for comfort,  After patient improvement they are ok with limited interventions such as abx which we will continue for now.         Acute cholangitis   S/p ERCP  -Previously resolved, and transition to abx with acute worsening, concern for worsening infection however labs are not suggestive, continue vanc and zosyn      DNR (do not resuscitate)        Palliative care encounter  Appreciate assistance  -dispo to [facility] pending cytology   -possible transition to hospice    Metastasis to liver  S/p bx pending cytology results    Pancreatic mass  CT abdomen & U/s show   - A mass was identified in the pancreatic head. This was staged T3 Nx M1 (based on metastasis to liver and pending cytologic confirmation).Cytology showing stage IV pancreatic adenocarcinoma   -onc consulted, appreciate recs & planning outpatient management  CA 19-9 >60K     Altered mental status, unspecified  Daughter provides most of information    Biliary obstruction  T bili trendign down    Goals of care, counseling/discussion  Made DNR, nearing comfort care.  Expect discharge to hospice         COPD (chronic obstructive pulmonary disease)  Stable Patient on Combo ICS LABA and LABA LAMA outpatient   - continue ICS, LABA, Added LAMA (spiriva),   - breathing treatment as needed        Congestive heart failure (CHF)  Borderline EF with signs of diastolic dysfunction  - Holding BB given severe COPD  - BP control to help reduce risk of flash Pulm edema.      Celiac disease  Stable no active issue     Plan:   - gluten free diet   -nutrition consult given history of celiac disease and better sources of gluten free options        Dementia in Alzheimer's disease  Patient mental status at baseline    Plan:  - Delirium precautions  - PT/OT             VTE Risk Mitigation (From admission, onward)        Ordered     IP VTE HIGH RISK PATIENT   Once      04/26/19 1926     Place CAT hose  Until discontinued      04/26/19 1845     Place sequential compression device  Until discontinued      04/26/19 1845              Anitra Michaud MD  Department of Hospital Medicine   [facility]

## 2019-05-04 NOTE — SUBJECTIVE & OBJECTIVE
Interval History:  She continues to have decreased oral intake. Moving towards comfort discussed with family plans for abx and monitoring. Still having leukocytosis, CT scan pending to evaluate for possible abscess after her AES procedure.     Review of Systems   Unable to perform ROS: Acuity of condition     Objective:     Vital Signs (Most Recent):  Temp: 98.1 °F (36.7 °C) (05/04/19 1205)  Pulse: 90 (05/04/19 1205)  Resp: 16 (05/04/19 1205)  BP: (!) 98/54 (05/04/19 1205)  SpO2: 98 % (05/04/19 1205) Vital Signs (24h Range):  Temp:  [97.4 °F (36.3 °C)-98.9 °F (37.2 °C)] 98.1 °F (36.7 °C)  Pulse:  [87-96] 90  Resp:  [16-24] 16  SpO2:  [95 %-98 %] 98 %  BP: ()/(54-63) 98/54     Weight: 48.5 kg (107 lb)  Body mass index is 18.95 kg/m².    Intake/Output Summary (Last 24 hours) at 5/4/2019 1521  Last data filed at 5/4/2019 1042  Gross per 24 hour   Intake 100 ml   Output --   Net 100 ml      Physical Exam   Constitutional: She appears well-developed. No distress.   thin   HENT:   Head: Normocephalic and atraumatic.   Eyes: Pupils are equal, round, and reactive to light.   Cardiovascular: Regular rhythm and normal heart sounds. Tachycardia present.   Pulmonary/Chest: Effort normal and breath sounds normal.   Abdominal: Soft. Bowel sounds are normal. She exhibits no distension. There is tenderness (Very mild, RUQ).   Musculoskeletal: She exhibits no edema or tenderness.   Neurological: She is alert.   disoriented to time and situation, but alert to place.   agitated    Skin: She is not diaphoretic. No pallor.   slightly jaundiced   Psychiatric: She has a normal mood and affect.   Vitals reviewed.      Significant Labs: All pertinent labs within the past 24 hours have been reviewed.    Significant Imaging: I have reviewed all pertinent imaging results/findings within the past 24 hours.

## 2019-05-04 NOTE — ASSESSMENT & PLAN NOTE
Appreciate assistance  -dispo to [facility] pending cytology   -possible transition to hospice

## 2019-05-04 NOTE — PLAN OF CARE
Problem: Adult Inpatient Plan of Care  Goal: Plan of Care Review  Outcome: Ongoing (interventions implemented as appropriate)     05/04/19 1706   Plan of Care Review   Plan of Care Reviewed With patient   Progress improving     Pt resting aaox4 family at bedside. V/s stable. No complaints of pain or discomfort at this time. Will continue to monitor and interventions as appropriate.

## 2019-05-05 LAB
ALBUMIN SERPL BCP-MCNC: 2 G/DL (ref 3.5–5.2)
ALP SERPL-CCNC: 183 U/L (ref 55–135)
ALT SERPL W/O P-5'-P-CCNC: 43 U/L (ref 10–44)
ANION GAP SERPL CALC-SCNC: 10 MMOL/L (ref 8–16)
AST SERPL-CCNC: 81 U/L (ref 10–40)
BASOPHILS # BLD AUTO: 0.09 K/UL (ref 0–0.2)
BASOPHILS NFR BLD: 0.4 % (ref 0–1.9)
BILIRUB SERPL-MCNC: 2.9 MG/DL (ref 0.1–1)
BUN SERPL-MCNC: 28 MG/DL (ref 8–23)
CALCIUM SERPL-MCNC: 7.9 MG/DL (ref 8.7–10.5)
CHLORIDE SERPL-SCNC: 109 MMOL/L (ref 95–110)
CO2 SERPL-SCNC: 20 MMOL/L (ref 23–29)
CREAT SERPL-MCNC: 1.4 MG/DL (ref 0.5–1.4)
DIFFERENTIAL METHOD: ABNORMAL
EOSINOPHIL # BLD AUTO: 0.1 K/UL (ref 0–0.5)
EOSINOPHIL NFR BLD: 0.5 % (ref 0–8)
ERYTHROCYTE [DISTWIDTH] IN BLOOD BY AUTOMATED COUNT: 20.1 % (ref 11.5–14.5)
EST. GFR  (AFRICAN AMERICAN): 39 ML/MIN/1.73 M^2
EST. GFR  (NON AFRICAN AMERICAN): 33.8 ML/MIN/1.73 M^2
GLUCOSE SERPL-MCNC: 111 MG/DL (ref 70–110)
HCT VFR BLD AUTO: 30.9 % (ref 37–48.5)
HGB BLD-MCNC: 10 G/DL (ref 12–16)
IMM GRANULOCYTES # BLD AUTO: 0.36 K/UL (ref 0–0.04)
IMM GRANULOCYTES NFR BLD AUTO: 1.6 % (ref 0–0.5)
LYMPHOCYTES # BLD AUTO: 1.2 K/UL (ref 1–4.8)
LYMPHOCYTES NFR BLD: 5.3 % (ref 18–48)
MCH RBC QN AUTO: 28.6 PG (ref 27–31)
MCHC RBC AUTO-ENTMCNC: 32.4 G/DL (ref 32–36)
MCV RBC AUTO: 88 FL (ref 82–98)
MONOCYTES # BLD AUTO: 1.5 K/UL (ref 0.3–1)
MONOCYTES NFR BLD: 7 % (ref 4–15)
NEUTROPHILS # BLD AUTO: 18.8 K/UL (ref 1.8–7.7)
NEUTROPHILS NFR BLD: 85.2 % (ref 38–73)
NRBC BLD-RTO: 0 /100 WBC
PLATELET # BLD AUTO: 84 K/UL (ref 150–350)
PMV BLD AUTO: ABNORMAL FL (ref 9.2–12.9)
POTASSIUM SERPL-SCNC: 4.6 MMOL/L (ref 3.5–5.1)
PROT SERPL-MCNC: 5.3 G/DL (ref 6–8.4)
RBC # BLD AUTO: 3.5 M/UL (ref 4–5.4)
SODIUM SERPL-SCNC: 139 MMOL/L (ref 136–145)
WBC # BLD AUTO: 22.09 K/UL (ref 3.9–12.7)

## 2019-05-05 PROCEDURE — 36415 COLL VENOUS BLD VENIPUNCTURE: CPT | Mod: HCNC

## 2019-05-05 PROCEDURE — 11000001 HC ACUTE MED/SURG PRIVATE ROOM: Mod: HCNC

## 2019-05-05 PROCEDURE — 25000003 PHARM REV CODE 250: Mod: HCNC | Performed by: STUDENT IN AN ORGANIZED HEALTH CARE EDUCATION/TRAINING PROGRAM

## 2019-05-05 PROCEDURE — 63600175 PHARM REV CODE 636 W HCPCS: Mod: HCNC | Performed by: STUDENT IN AN ORGANIZED HEALTH CARE EDUCATION/TRAINING PROGRAM

## 2019-05-05 PROCEDURE — 85025 COMPLETE CBC W/AUTO DIFF WBC: CPT | Mod: HCNC

## 2019-05-05 PROCEDURE — 80053 COMPREHEN METABOLIC PANEL: CPT | Mod: HCNC

## 2019-05-05 RX ORDER — CIPROFLOXACIN 500 MG/1
500 TABLET ORAL EVERY 12 HOURS
Qty: 8 TABLET | Refills: 0
Start: 2019-05-05 | End: 2019-05-05

## 2019-05-05 RX ORDER — CIPROFLOXACIN 500 MG/1
500 TABLET ORAL EVERY 12 HOURS
Status: DISCONTINUED | OUTPATIENT
Start: 2019-05-05 | End: 2019-05-05

## 2019-05-05 RX ORDER — CIPROFLOXACIN 500 MG/1
500 TABLET ORAL EVERY 24 HOURS
Status: DISCONTINUED | OUTPATIENT
Start: 2019-05-06 | End: 2019-05-08

## 2019-05-05 RX ORDER — METRONIDAZOLE 500 MG/1
500 TABLET ORAL EVERY 8 HOURS
Status: DISCONTINUED | OUTPATIENT
Start: 2019-05-05 | End: 2019-05-08

## 2019-05-05 RX ORDER — CIPROFLOXACIN 500 MG/1
500 TABLET ORAL
Qty: 8 TABLET | Refills: 0
Start: 2019-05-06 | End: 2019-05-08 | Stop reason: HOSPADM

## 2019-05-05 RX ORDER — METRONIDAZOLE 500 MG/1
500 TABLET ORAL EVERY 8 HOURS
Status: DISCONTINUED | OUTPATIENT
Start: 2019-05-05 | End: 2019-05-05

## 2019-05-05 RX ORDER — LORAZEPAM 0.5 MG/1
0.5 TABLET ORAL EVERY 6 HOURS PRN
Start: 2019-05-05 | End: 2019-05-08

## 2019-05-05 RX ORDER — METRONIDAZOLE 500 MG/1
500 TABLET ORAL EVERY 8 HOURS
Qty: 12 TABLET | Refills: 0
Start: 2019-05-05 | End: 2019-05-08 | Stop reason: HOSPADM

## 2019-05-05 RX ORDER — CIPROFLOXACIN 500 MG/1
500 TABLET ORAL EVERY 24 HOURS
Status: DISCONTINUED | OUTPATIENT
Start: 2019-05-05 | End: 2019-05-05

## 2019-05-05 RX ADMIN — METRONIDAZOLE 500 MG: 500 TABLET ORAL at 01:05

## 2019-05-05 RX ADMIN — APIXABAN 10 MG: 5 TABLET, FILM COATED ORAL at 01:05

## 2019-05-05 RX ADMIN — CIPROFLOXACIN HYDROCHLORIDE 500 MG: 500 TABLET, FILM COATED ORAL at 01:05

## 2019-05-05 RX ADMIN — PIPERACILLIN AND TAZOBACTAM 4.5 G: 4; .5 INJECTION, POWDER, LYOPHILIZED, FOR SOLUTION INTRAVENOUS; PARENTERAL at 05:05

## 2019-05-05 NOTE — PLAN OF CARE
Problem: Adult Inpatient Plan of Care  Goal: Plan of Care Review  Outcome: Ongoing (interventions implemented as appropriate)     05/05/19 0919   Plan of Care Review   Plan of Care Reviewed With patient   Progress improving     Pt resting no complaints of pain or discomfort. Pt is only alert to self. Pt is on comfort measures. Will continue to monitor and interventions as appropriate.

## 2019-05-05 NOTE — PROGRESS NOTES
Therapy with Vancomycin complete and/or consult discontinued by provider.  Pharmacy will sign off, please re-consult as needed.

## 2019-05-05 NOTE — PLAN OF CARE
[facility]  Department of Hospital Medicine  [address] [place] [address]  [phone] after hours  [phone] fax    HOSPICE  ORDERS    05/05/2019    Admit to Hospice:  Inpatient Service     Diagnoses:   Active Hospital Problems    Diagnosis  POA    *Neoplasm of head of pancreas [D49.0]  Yes    Comfort measures only status [Z51.5]  Not Applicable    Palliative care encounter [Z51.5]  Not Applicable    DNR (do not resuscitate) [Z66]  Unknown    Acute cholangitis [K83.09]  Unknown    Altered mental status, unspecified [R41.82]  Yes    Pancreatic mass [K86.9]  Unknown    Metastasis to liver [C78.7]  Unknown    Goals of care, counseling/discussion [Z71.89]  Not Applicable    Biliary obstruction [K83.1]  Unknown    COPD (chronic obstructive pulmonary disease) [J44.9]  Yes    Congestive heart failure (CHF) [I50.9]  Yes    Celiac disease [K90.0]  Yes     Chronic    Dementia in Alzheimer's disease [G30.9, F02.80]  Yes      Resolved Hospital Problems   No resolved problems to display.       Hospice Qualifying Diagnoses: Metastatic Pancreatic Cancer        Patient has a life expectancy < 6 months due to:  1) Primary Hospice Diagnosis: Metastatic Pancreatic Cancer  Comorbid Conditions Contributing to Decline: Bilateral DVT of Lower extremities, acute cholangitis, poor oral intake, malnutrition     Vital Signs: Routine per Hospice Protocol.    Code Status: DNR    Allergies:   Review of patient's allergies indicates:   Allergen Reactions    Gluten protein        Diet: Gluten Free diet with Boost TIDWM, or protein powder in Iced Drinks    Activities: As tolerated    Nursing: Per Hospice Routine.     Routine Skin for Bedridden Patients: Apply moisture barrier cream to all skin folds and   wet areas in perineal area daily and after baths and all bowel movements.      Oxygen: Oxygen saturation goal of 88-92%,   Can use Oxygen 2L PRN    Other Miscellaneous Care:      Medications:      Zeynep Hamm   Home Medication Instructions BEL:[id number]    Printed on:05/05/19 7452   Medication Information                      albuterol 90 mcg/actuation inhaler  Inhale 2 puffs into the lungs every 4 (four) hours as needed for Wheezing.             albuterol-ipratropium (DUO-NEB) 2.5 mg-0.5 mg/3 mL nebulizer solution  Take 3 mLs by nebulization every 6 (six) hours as needed for Wheezing or Shortness of Breath. Rescue             apixaban (ELIQUIS) 5 mg Tab  Take 2 tablets (10 mg total) by mouth 2 (two) times daily. Take 10mg BID for 7 days, then 5mg BID afterwards for 7 days. End date: 05/12/2019             apixaban (ELIQUIS) 5 mg Tab  Take 1 tablet (5 mg total) by mouth 2 (two) times daily. Take 10mg BID for 7 days then 5mg BID for 1 day. From 5/13/2019 onwards to be determined by provider.              ciprofloxacin HCl (CIPRO) 500 MG tablet  Take 1 tablet (500 mg total) by mouth every 24 hours as needed. End date 05/09/2019             furosemide (LASIX) 20 MG tablet  Take 1 tablet (20 mg total) by mouth as needed. For Weight Gain > 2-3 lbs in 1 day or 4-6 lbs, please give lasix 20mg PO as needed             LORazepam (ATIVAN) 0.5 MG tablet  Take 1 tablet (0.5 mg total) by mouth every 6 (six) hours as needed for Anxiety (RASS score greater than 0).             metroNIDAZOLE (FLAGYL) 500 MG tablet  Take 1 tablet (500 mg total) by mouth every 8 (eight) hours. for 4 days END date 05/06/2019             SYMBICORT 160-4.5 mcg/actuation HFAA  Inhale 2 puffs into the lungs 2 (two) times daily.              umeclidinium-vilanterol (ANORO ELLIPTA) 62.5-25 mcg/actuation DsDv  Inhale 1 puff into the lungs once daily. Controller                 Future Orders:  Hospice Medical Director may dictate new orders for comfortable care measures & sign death certificate.        _________________________________  Anitra Michaud MD  05/05/2019

## 2019-05-05 NOTE — PLAN OF CARE
CM received call from 5 - pt/family wanting to move forward w/ hospice and feel that inpat is appropriate. CM met w/ pt's dgtr (family contact), Suzan Torrez p [phone] - family wants [facility] bed. CM contacted [facility] and spoke w/ Jessica - she will meet w/ family but is currently enroute to [facility] for another pt and will be to [facility] after that. CM will continue to follow.    Update: no [facility] hospice bed available and Jessica w/ [facility] deemed pt not to be inpatient appropriate d/t lack of interventions such as scheduled pain meds and pt has not taken prn pain meds in 2 days. REDDY relayed info to MD - states that pt is not appropriate for home therefore reassessment w/ [facility]mw vs referral to [facility]. Dgtr states the NH w/ hospice is a last resort - 1st choice is [facility] bed 2nd choice [facility] inpat and 3rd choice is [facility]. Pt's dgtr's primary goal is for pt not to have to be excessively disturbed by the ambulance. REDDY relayed that MD will allow topic to be revisited tmw w/ M-F CM/SW as pt's condition is changing and worsening. Pt's dgtr is in agreement w/ f/u tmw.

## 2019-05-05 NOTE — PROGRESS NOTES
Pharmacist Renal Dose Adjustment Note    Zeynep Hamm is a 87 y.o. female being treated with the medication Ciprofloxacin 500mg PO q12h    Patient Data:    Vital Signs (Most Recent):  Temp: 97.2 °F (36.2 °C) (05/05/19 0919)  Pulse: 92 (05/05/19 0919)  Resp: 18 (05/05/19 0919)  BP: 95/68 (05/05/19 0919)  SpO2: 98 % (05/05/19 0919) Vital Signs (72h Range):  Temp:  [96.8 °F (36 °C)-98.9 °F (37.2 °C)]   Pulse:  []   Resp:  [14-27]   BP: ()/(47-82)   SpO2:  [88 %-98 %]      Recent Labs   Lab 05/04/19  0349 05/04/19  1530 05/05/19  0455   CREATININE 1.4 1.2 1.4     Serum creatinine: 1.4 mg/dL 05/05/19 0455  Estimated creatinine clearance: 21.5 mL/min    Medication:Ciprofloxacin 500mg q12h will be changed to medication: Ciprofloxacin 500mg q24h    Pharmacist's Name: Esperanza Mccallum  Pharmacist's Extension: [phone]

## 2019-05-05 NOTE — SUBJECTIVE & OBJECTIVE
Interval History:  Patient seems to be comfortable    Review of Systems   Unable to perform ROS: Acuity of condition     Objective:     Vital Signs (Most Recent):  Temp: 97.8 °F (36.6 °C) (05/04/19 2018)  Pulse: 91 (05/05/19 0250)  Resp: 18 (05/04/19 2018)  BP: 101/69 (05/04/19 2018)  SpO2: 97 % (05/04/19 2100) Vital Signs (24h Range):  Temp:  [97.8 °F (36.6 °C)-98.1 °F (36.7 °C)] 97.8 °F (36.6 °C)  Pulse:  [] 91  Resp:  [16-18] 18  SpO2:  [97 %-98 %] 97 %  BP: ()/(54-69) 101/69     Weight: 48 kg (105 lb 13.1 oz)  Body mass index is 18.75 kg/m².    Intake/Output Summary (Last 24 hours) at 5/5/2019 0830  Last data filed at 5/5/2019 0505  Gross per 24 hour   Intake 1300 ml   Output 800 ml   Net 500 ml      Physical Exam   Constitutional: She appears well-developed. No distress.   thin   HENT:   Head: Normocephalic and atraumatic.   Eyes: Pupils are equal, round, and reactive to light.   Cardiovascular: Regular rhythm and normal heart sounds. Tachycardia present.   Pulmonary/Chest: Breath sounds normal.   On NC   Abdominal: Soft. Bowel sounds are normal. She exhibits no distension. There is no tenderness (Very mild, RUQ).   Musculoskeletal: She exhibits no edema or tenderness.   Neurological: She is alert.   O x 2 to place and person   Skin: She is not diaphoretic. No pallor.   slightly jaundiced   Psychiatric: She has a normal mood and affect.   Vitals reviewed.      Significant Labs: All pertinent labs within the past 24 hours have been reviewed.    Significant Imaging: I have reviewed all pertinent imaging results/findings within the past 24 hours.

## 2019-05-05 NOTE — ASSESSMENT & PLAN NOTE
Discussed with family plans for comfort,  After patient improvement they are ok with limited interventions such as abx and AC

## 2019-05-06 PROBLEM — Z75.8 DISCHARGE PLANNING ISSUES: Status: ACTIVE | Noted: 2019-05-06

## 2019-05-06 LAB
ALBUMIN SERPL BCP-MCNC: 1.8 G/DL (ref 3.5–5.2)
ALP SERPL-CCNC: 172 U/L (ref 55–135)
ALT SERPL W/O P-5'-P-CCNC: 34 U/L (ref 10–44)
ANION GAP SERPL CALC-SCNC: 9 MMOL/L (ref 8–16)
AST SERPL-CCNC: 46 U/L (ref 10–40)
BASOPHILS # BLD AUTO: 0.06 K/UL (ref 0–0.2)
BASOPHILS NFR BLD: 0.3 % (ref 0–1.9)
BILIRUB SERPL-MCNC: 2.5 MG/DL (ref 0.1–1)
BUN SERPL-MCNC: 25 MG/DL (ref 8–23)
CALCIUM SERPL-MCNC: 8.3 MG/DL (ref 8.7–10.5)
CHLORIDE SERPL-SCNC: 111 MMOL/L (ref 95–110)
CO2 SERPL-SCNC: 21 MMOL/L (ref 23–29)
CREAT SERPL-MCNC: 1.2 MG/DL (ref 0.5–1.4)
DIFFERENTIAL METHOD: ABNORMAL
EOSINOPHIL # BLD AUTO: 0.1 K/UL (ref 0–0.5)
EOSINOPHIL NFR BLD: 0.7 % (ref 0–8)
ERYTHROCYTE [DISTWIDTH] IN BLOOD BY AUTOMATED COUNT: 21.1 % (ref 11.5–14.5)
EST. GFR  (AFRICAN AMERICAN): 47 ML/MIN/1.73 M^2
EST. GFR  (NON AFRICAN AMERICAN): 40.7 ML/MIN/1.73 M^2
GLUCOSE SERPL-MCNC: 108 MG/DL (ref 70–110)
HCT VFR BLD AUTO: 29.6 % (ref 37–48.5)
HGB BLD-MCNC: 9.9 G/DL (ref 12–16)
IMM GRANULOCYTES # BLD AUTO: 0.25 K/UL (ref 0–0.04)
IMM GRANULOCYTES NFR BLD AUTO: 1.3 % (ref 0–0.5)
LYMPHOCYTES # BLD AUTO: 0.9 K/UL (ref 1–4.8)
LYMPHOCYTES NFR BLD: 4.8 % (ref 18–48)
MCH RBC QN AUTO: 29 PG (ref 27–31)
MCHC RBC AUTO-ENTMCNC: 33.4 G/DL (ref 32–36)
MCV RBC AUTO: 87 FL (ref 82–98)
MONOCYTES # BLD AUTO: 1.5 K/UL (ref 0.3–1)
MONOCYTES NFR BLD: 7.4 % (ref 4–15)
NEUTROPHILS # BLD AUTO: 16.9 K/UL (ref 1.8–7.7)
NEUTROPHILS NFR BLD: 85.5 % (ref 38–73)
NRBC BLD-RTO: 1 /100 WBC
PLATELET # BLD AUTO: 108 K/UL (ref 150–350)
PMV BLD AUTO: ABNORMAL FL (ref 9.2–12.9)
POTASSIUM SERPL-SCNC: 4.2 MMOL/L (ref 3.5–5.1)
PROT SERPL-MCNC: 5.2 G/DL (ref 6–8.4)
RBC # BLD AUTO: 3.41 M/UL (ref 4–5.4)
SODIUM SERPL-SCNC: 141 MMOL/L (ref 136–145)
WBC # BLD AUTO: 19.71 K/UL (ref 3.9–12.7)

## 2019-05-06 PROCEDURE — 27000221 HC OXYGEN, UP TO 24 HOURS: Mod: HCNC

## 2019-05-06 PROCEDURE — 25000003 PHARM REV CODE 250: Mod: HCNC | Performed by: STUDENT IN AN ORGANIZED HEALTH CARE EDUCATION/TRAINING PROGRAM

## 2019-05-06 PROCEDURE — 94761 N-INVAS EAR/PLS OXIMETRY MLT: CPT | Mod: HCNC

## 2019-05-06 PROCEDURE — 63600175 PHARM REV CODE 636 W HCPCS: Mod: HCNC | Performed by: STUDENT IN AN ORGANIZED HEALTH CARE EDUCATION/TRAINING PROGRAM

## 2019-05-06 PROCEDURE — 25000242 PHARM REV CODE 250 ALT 637 W/ HCPCS: Mod: HCNC | Performed by: STUDENT IN AN ORGANIZED HEALTH CARE EDUCATION/TRAINING PROGRAM

## 2019-05-06 PROCEDURE — 11000001 HC ACUTE MED/SURG PRIVATE ROOM: Mod: HCNC

## 2019-05-06 PROCEDURE — 99232 PR SUBSEQUENT HOSPITAL CARE,LEVL II: ICD-10-PCS | Mod: HCNC,GC,, | Performed by: HOSPITALIST

## 2019-05-06 PROCEDURE — 36415 COLL VENOUS BLD VENIPUNCTURE: CPT | Mod: HCNC

## 2019-05-06 PROCEDURE — 99232 SBSQ HOSP IP/OBS MODERATE 35: CPT | Mod: HCNC,GC,, | Performed by: HOSPITALIST

## 2019-05-06 PROCEDURE — 85025 COMPLETE CBC W/AUTO DIFF WBC: CPT | Mod: HCNC

## 2019-05-06 PROCEDURE — 80053 COMPREHEN METABOLIC PANEL: CPT | Mod: HCNC

## 2019-05-06 RX ADMIN — TIOTROPIUM BROMIDE 18 MCG: 18 CAPSULE ORAL; RESPIRATORY (INHALATION) at 09:05

## 2019-05-06 RX ADMIN — APIXABAN 10 MG: 5 TABLET, FILM COATED ORAL at 09:05

## 2019-05-06 RX ADMIN — ONDANSETRON 4 MG: 2 INJECTION INTRAMUSCULAR; INTRAVENOUS at 09:05

## 2019-05-06 RX ADMIN — FLUTICASONE FUROATE AND VILANTEROL TRIFENATATE 1 PUFF: 100; 25 POWDER RESPIRATORY (INHALATION) at 09:05

## 2019-05-06 RX ADMIN — METRONIDAZOLE 500 MG: 500 TABLET ORAL at 01:05

## 2019-05-06 RX ADMIN — CIPROFLOXACIN HYDROCHLORIDE 500 MG: 500 TABLET, FILM COATED ORAL at 09:05

## 2019-05-06 NOTE — PT/OT/SLP DISCHARGE
Physical Therapy Discharge Summary    Name: Zeynep Hamm  MRN: [id number]   Principal Problem: Neoplasm of head of pancreas     Patient Discharged from acute Physical Therapy on 2019.  Please refer to prior PT noted date on 2019 for functional status.     Assessment:     Orders d/c by medical team    Objective:     GOALS:   Multidisciplinary Problems     Physical Therapy Goals        Problem: Physical Therapy Goal    Goal Priority Disciplines Outcome Goal Variances Interventions   Physical Therapy Goal     PT, PT/OT Ongoing (interventions implemented as appropriate)     Description:  Goals to be met by: 19     Patient will increase functional independence with mobility by performin. Sit to stand transfer with Supervision  2. Bed to chair transfer with Supervision using appropriate AD  3. Gait  x 200 feet with Stand-by Assistance using appropriate AD.   4. Ascend/Descend 6 inch curb step with Contact Guard Assistance using appropriate AD.                      Reasons for Discontinuation of Therapy Services  PT orders d/c by medical team 19      Plan:     Patient Discharged to: continued acute care per MARY CARMEN Mares, PT  2019

## 2019-05-06 NOTE — PLAN OF CARE
"Problem: Adult Inpatient Plan of Care  Goal: Plan of Care Review  Outcome: Ongoing (interventions implemented as appropriate)     05/06/19 8229   Plan of Care Review   Plan of Care Reviewed With patient     Patient AAO x 1-2 and denies pain.  Patient refuses medications and TQ2 hr and patient responds, "when will you be done."  Patient refuses medications CBG.  Patient is palliative and promoting comfort care.  Patient is to discharge to hospice services and family remains at bedside.      "

## 2019-05-06 NOTE — ASSESSMENT & PLAN NOTE
S/p ERCP  -Previously resolved, and transition to abx with acute worsening, concern for worsening infection however labs are not suggestive,   - continue  Ciprofloxacin and Flagyl

## 2019-05-06 NOTE — ASSESSMENT & PLAN NOTE
- Patient transition to hospice with focus in comfort care  - Awaiting placement in nursing home hospice

## 2019-05-06 NOTE — PLAN OF CARE
05/06/19 1521   Post-Acute Status   Post-Acute Authorization Placement   Post-Acute Placement Status Referrals Sent    sent referrals to [facility], [facility] and [facility]  via MILA Avila, [place]  [facility]  [id number]

## 2019-05-06 NOTE — PT/OT/SLP PROGRESS
Physical Therapy      Patient Name:  Zeynep Hamm   MRN:  [id number]    Patient not seen today secondary to Other (Comment)(Pts therapy orders discontinued and per chart review pt on comfort care measures d/t medical decline. PT to follow with documentation of discharge of acute therapy services. ). Will follow-up per POC as appropriate.     Pan Nichole, PTA

## 2019-05-06 NOTE — ASSESSMENT & PLAN NOTE
Newly diagnosed, with what is likely mets to the liver, Biopsy positive for adenocarcinoma, family wishes to pursue hospice   -Awaiting placement  - Focus on comfort stay

## 2019-05-06 NOTE — PROGRESS NOTES
[facility] Hospital Medicine  Progress Note    Patient Name: Zeynep Hamm  MRN: [id number]  Patient Class: IP- Inpatient   Admission Date: 4/26/2019  Length of Stay: 10 days  Attending Physician: Annika Osborne MD  Primary Care Provider: Maya Kelley MD    [facility] Medicine Team: [facility] MED 5 Darrius Daniels MD    Subjective:     Principal Problem:Neoplasm of head of pancreas    HPI:  87 years old female with past medical history of Alzheimer, COPD, Cataract, Celiac disease, Diastolic HF (EF 45%) presented to ED with fatigue.  Per patient daughter, she has been increasingly getting weaker over the last 3 weeks to a point where she is unable to stand up today and that's when the daughter decided to bring the patient to ED, at baseline patient able to walk short distance, she also has been sleeping more, eat less than usual with gradual wieght loss over coupleof weeks, noticed to have yellowish skin discoloration today, otherwise has no fever, chills, chest pain, shortness of breath, abdominal pain, nausea, vomiting, diarrhea and urinary symptoms. Patient quit smoking 35 years ago, doesn't drink alcohol, no recent surgeries or travel, has family history of leukemia and mediastinal mass.She was admitted to the hospital recently on 4/18/2019 with COPD exacerbation and new onset systolic congestive heart failure, started on lasix.  At ED patient patient was hypotensive and tachycardic, labs significant for elevated WBC, LFTs and bilirubin, U/S abdomin showed head of pancrease mass with extra and intrahepatic biliary ductal dilatation and hepatic lesions.        Hospital Course:  Admitted for suspected cholangitis, From primary pancreatic adenocarcinoma and innumerable large liver lesions concerning for metastatic disease. She is on Ciprofloxacin and Flagyl for acute cholangitis. She is being transition to hospice, and awaiting placement in nursing home. On AC for B/l DVT.    Interval  History:  Patient seems to be comfortable    Review of Systems   Unable to perform ROS: Acuity of condition     Objective:     Vital Signs (Most Recent):  Temp: 97.8 °F (36.6 °C) (05/04/19 2018)  Pulse: 91 (05/05/19 0250)  Resp: 18 (05/04/19 2018)  BP: 101/69 (05/04/19 2018)  SpO2: 97 % (05/04/19 2100) Vital Signs (24h Range):  Temp:  [97.8 °F (36.6 °C)-98.1 °F (36.7 °C)] 97.8 °F (36.6 °C)  Pulse:  [] 91  Resp:  [16-18] 18  SpO2:  [97 %-98 %] 97 %  BP: ()/(54-69) 101/69     Weight: 48 kg (105 lb 13.1 oz)  Body mass index is 18.75 kg/m².    Intake/Output Summary (Last 24 hours) at 5/5/2019 0830  Last data filed at 5/5/2019 0505  Gross per 24 hour   Intake 1300 ml   Output 800 ml   Net 500 ml      Physical Exam   Constitutional: She appears well-developed. No distress.   thin   HENT:   Head: Normocephalic and atraumatic.   Eyes: Pupils are equal, round, and reactive to light.   Cardiovascular: Regular rhythm and normal heart sounds. Tachycardia present.   Pulmonary/Chest: Breath sounds normal.   On NC   Abdominal: Soft. Bowel sounds are normal. She exhibits no distension. There is no tenderness (Very mild, RUQ).   Musculoskeletal: She exhibits no edema or tenderness.   Neurological: She is alert.   O x 2 to place and person   Skin: She is not diaphoretic. No pallor.   slightly jaundiced   Psychiatric: She has a normal mood and affect.   Vitals reviewed.      Significant Labs: All pertinent labs within the past 24 hours have been reviewed.    Significant Imaging: I have reviewed all pertinent imaging results/findings within the past 24 hours.    Assessment/Plan:      * Neoplasm of head of pancreas  Newly diagnosed, with what is likely mets to the liver, Biopsy positive for adenocarcinoma, family wishes to pursue hospice   -Awaiting placement  - Focus on comfort stay    Discharge planning issues  - Patient transition to hospice with focus in comfort care  - Awaiting placement in nursing home  hospice    Comfort measures only status  Discussed with family plans for comfort,  After patient improvement they are ok with limited interventions such as abx and AC      Acute cholangitis   S/p ERCP  -Previously resolved, and transition to abx with acute worsening, concern for worsening infection however labs are not suggestive,   - continue  Ciprofloxacin and Flagyl       Metastasis to liver with biliary obstruction  - See pancreatic mass    Pancreatic mass  CT abdomen & U/s show   - A mass was identified in the pancreatic head. This was staged T3 Nx M1 (based on metastasis to liver and cytologic confirmation).Cytology showing stage IV pancreatic adenocarcinoma   -onc consulted, appreciate recs   CA 19-9 >60K   - Transitioned to hospice      Goals of care, counseling/discussion  Made DNR, nearing comfort care.    Expect discharge to nursing home  hospice         COPD (chronic obstructive pulmonary disease)  Stable Patient on Combo ICS LABA and LABA LAMA outpatient   - continue ICS, LABA, Added LAMA (spiriva),   - breathing treatment as needed      Congestive heart failure (CHF)  Borderline EF with signs of diastolic dysfunction  - Holding BB given severe COPD      Celiac disease  Stable no active issue     Plan:   - gluten free diet   -nutrition consult given history of celiac disease and better sources of gluten free options        Dementia in Alzheimer's disease  Patient mental status at baseline    Plan:  - Delirium precautions             VTE Risk Mitigation (From admission, onward)        Ordered     apixaban tablet 10 mg  2 times daily      05/05/19 1247     IP VTE HIGH RISK PATIENT  Once      04/26/19 1926     Place CAT hose  Until discontinued      04/26/19 1845     Place sequential compression device  Until discontinued      04/26/19 1845              Darrius Daniels MD  Department of Hospital Medicine   [facility]

## 2019-05-06 NOTE — ASSESSMENT & PLAN NOTE
CT abdomen & U/s show   - A mass was identified in the pancreatic head. This was staged T3 Nx M1 (based on metastasis to liver and cytologic confirmation).Cytology showing stage IV pancreatic adenocarcinoma   -onc consulted, appreciate recs   CA 19-9 >60K   - Transitioned to hospice

## 2019-05-07 PROCEDURE — 86580 TB INTRADERMAL TEST: CPT | Mod: HCNC | Performed by: HOSPITALIST

## 2019-05-07 PROCEDURE — 63600175 PHARM REV CODE 636 W HCPCS: Mod: HCNC | Performed by: STUDENT IN AN ORGANIZED HEALTH CARE EDUCATION/TRAINING PROGRAM

## 2019-05-07 PROCEDURE — 99231 SBSQ HOSP IP/OBS SF/LOW 25: CPT | Mod: HCNC,GC,, | Performed by: HOSPITALIST

## 2019-05-07 PROCEDURE — 99231 PR SUBSEQUENT HOSPITAL CARE,LEVL I: ICD-10-PCS | Mod: HCNC,GC,, | Performed by: HOSPITALIST

## 2019-05-07 PROCEDURE — 30200315 PPD INTRADERMAL TEST REV CODE 302: Mod: HCNC | Performed by: HOSPITALIST

## 2019-05-07 PROCEDURE — 25000003 PHARM REV CODE 250: Mod: HCNC | Performed by: STUDENT IN AN ORGANIZED HEALTH CARE EDUCATION/TRAINING PROGRAM

## 2019-05-07 PROCEDURE — 11000001 HC ACUTE MED/SURG PRIVATE ROOM: Mod: HCNC

## 2019-05-07 RX ADMIN — APIXABAN 10 MG: 5 TABLET, FILM COATED ORAL at 08:05

## 2019-05-07 RX ADMIN — METRONIDAZOLE 500 MG: 500 TABLET ORAL at 09:05

## 2019-05-07 RX ADMIN — Medication 5 UNITS: at 05:05

## 2019-05-07 RX ADMIN — METRONIDAZOLE 500 MG: 500 TABLET ORAL at 03:05

## 2019-05-07 RX ADMIN — ONDANSETRON 4 MG: 2 INJECTION INTRAMUSCULAR; INTRAVENOUS at 09:05

## 2019-05-07 RX ADMIN — CIPROFLOXACIN HYDROCHLORIDE 500 MG: 500 TABLET, FILM COATED ORAL at 08:05

## 2019-05-07 NOTE — PLAN OF CARE
REDDY spoke with Codi from [facility]. shelter bed for patient should be available on 5/8/19. Facility works with [facility] which daughter (Jan) has chosen to provide care at Nursing Home. Referral sent to [facility] via [facility] care. Will continue to follow for discharge needs.

## 2019-05-07 NOTE — TREATMENT PLAN
AES Follow-up Note     Patient is on comfort measures now.     AES will sign off. Please call if any questions.    Zo Torrez MD  Gastroenterology & Hepatology Fellow   Pager: [phone]

## 2019-05-07 NOTE — ASSESSMENT & PLAN NOTE
Discussed with family plans for comfort,  After patient improvement they are ok with limited interventions such as abx and AC in and effort to increase comfort.    - Plan for discharge to NH with hospice 5/08

## 2019-05-07 NOTE — SUBJECTIVE & OBJECTIVE
Interval History:  Patient seems to be comfortable. Comfort care with plans for hospice     Review of Systems   Unable to perform ROS: Acuity of condition     Objective:     Vital Signs (Most Recent):  Temp: 97.8 °F (36.6 °C) (05/07/19 1517)  Pulse: (!) 111 (05/07/19 1517)  Resp: 14 (05/07/19 1517)  BP: (!) 117/59 (05/07/19 1517)  SpO2: 98 % (05/07/19 1517) Vital Signs (24h Range):  Temp:  [97 °F (36.1 °C)-98.4 °F (36.9 °C)] 97.8 °F (36.6 °C)  Pulse:  [] 111  Resp:  [12-18] 14  SpO2:  [91 %-98 %] 98 %  BP: ()/(59-65) 117/59     Weight: 48 kg (105 lb 13.1 oz)  Body mass index is 18.75 kg/m².  No intake or output data in the 24 hours ending 05/07/19 1529   Physical Exam   Constitutional: She appears well-developed. No distress.   thin   HENT:   Head: Normocephalic and atraumatic.   Eyes: Pupils are equal, round, and reactive to light.   Cardiovascular: Regular rhythm and normal heart sounds. Tachycardia present.   Pulmonary/Chest: Breath sounds normal.   On NC   Abdominal: Soft. Bowel sounds are normal. She exhibits no distension. There is no tenderness (Very mild, RUQ).   Musculoskeletal: She exhibits no edema or tenderness.   Neurological: She is alert.   O x 2 to place and person   Skin: She is not diaphoretic. No pallor.   slightly jaundiced   Psychiatric: She has a normal mood and affect.   Vitals reviewed.      Significant Labs: All pertinent labs within the past 24 hours have been reviewed.    Significant Imaging: I have reviewed all pertinent imaging results/findings within the past 24 hours.

## 2019-05-07 NOTE — PROGRESS NOTES
[facility] Hospital Medicine  Progress Note    Patient Name: Zeynep Hamm  MRN: [id number]  Patient Class: IP- Inpatient   Admission Date: 4/26/2019  Length of Stay: 11 days  Attending Physician: Annika Osborne MD  Primary Care Provider: Maya Kelley MD    [facility] Medicine Team: [facility] HOSP MED 5 Nasim Pagie MD    Subjective:     Principal Problem:Neoplasm of head of pancreas    HPI:  87 years old female with past medical history of Alzheimer, COPD, Cataract, Celiac disease, Diastolic HF (EF 45%) presented to ED with fatigue.  Per patient daughter, she has been increasingly getting weaker over the last 3 weeks to a point where she is unable to stand up today and that's when the daughter decided to bring the patient to ED, at baseline patient able to walk short distance, she also has been sleeping more, eat less than usual with gradual wieght loss over coupleof weeks, noticed to have yellowish skin discoloration today, otherwise has no fever, chills, chest pain, shortness of breath, abdominal pain, nausea, vomiting, diarrhea and urinary symptoms. Patient quit smoking 35 years ago, doesn't drink alcohol, no recent surgeries or travel, has family history of leukemia and mediastinal mass.She was admitted to the hospital recently on 4/18/2019 with COPD exacerbation and new onset systolic congestive heart failure, started on lasix.  At ED patient patient was hypotensive and tachycardic, labs significant for elevated WBC, LFTs and bilirubin, U/S abdomin showed head of pancrease mass with extra and intrahepatic biliary ductal dilatation and hepatic lesions.        Hospital Course:  Admitted for suspected cholangitis, From primary pancreatic adenocarcinoma and innumerable large liver lesions concerning for metastatic disease. She is on Ciprofloxacin and Flagyl for acute cholangitis. She is being transition to hospice, and awaiting placement in nursing home. On AC for B/l DVT.    Interval  History:  Patient seems to be comfortable. Comfort care with plans for hospice     Review of Systems   Unable to perform ROS: Acuity of condition     Objective:     Vital Signs (Most Recent):  Temp: 97.8 °F (36.6 °C) (05/07/19 1517)  Pulse: (!) 111 (05/07/19 1517)  Resp: 14 (05/07/19 1517)  BP: (!) 117/59 (05/07/19 1517)  SpO2: 98 % (05/07/19 1517) Vital Signs (24h Range):  Temp:  [97 °F (36.1 °C)-98.4 °F (36.9 °C)] 97.8 °F (36.6 °C)  Pulse:  [] 111  Resp:  [12-18] 14  SpO2:  [91 %-98 %] 98 %  BP: ()/(59-65) 117/59     Weight: 48 kg (105 lb 13.1 oz)  Body mass index is 18.75 kg/m².  No intake or output data in the 24 hours ending 05/07/19 1529   Physical Exam   Constitutional: She appears well-developed. No distress.   thin   HENT:   Head: Normocephalic and atraumatic.   Eyes: Pupils are equal, round, and reactive to light.   Cardiovascular: Regular rhythm and normal heart sounds. Tachycardia present.   Pulmonary/Chest: Breath sounds normal.   On NC   Abdominal: Soft. Bowel sounds are normal. She exhibits no distension. There is no tenderness (Very mild, RUQ).   Musculoskeletal: She exhibits no edema or tenderness.   Neurological: She is alert.   O x 2 to place and person   Skin: She is not diaphoretic. No pallor.   slightly jaundiced   Psychiatric: She has a normal mood and affect.   Vitals reviewed.      Significant Labs: All pertinent labs within the past 24 hours have been reviewed.    Significant Imaging: I have reviewed all pertinent imaging results/findings within the past 24 hours.    Assessment/Plan:      * Metastatic pancreatic cancer   Newly diagnosed, with what is likely mets to the liver, Biopsy positive for adenocarcinoma, family wishes to pursue hospice   -Awaiting placement  - Focus on comfort stay      Comfort measures only status  Discussed with family plans for comfort,  After patient improvement they are ok with limited interventions such as abx and AC in and effort to increase  comfort.    - Plan for discharge to NH with hospice 5/08      Discharge planning issues  - Patient transition to hospice with focus in comfort care  - Awaiting placement in nursing home hospice    Acute cholangitis   S/p ERCP  -Previously resolved, and transition to abx with acute worsening, concern for worsening infection however labs are not suggestive,   - continue  Ciprofloxacin and Flagyl       DNR (do not resuscitate)        Palliative care encounter  Appreciate assistance  -dispo to [facility] pending cytology   -possible transition to hospice    Metastasis to liver  - See pancreatic mass    Pancreatic mass       See principle problem   CT abdomen & U/s show   - A mass was identified in the pancreatic head. This was staged T3 Nx M1 (based on metastasis to liver and cytologic confirmation).Cytology showing stage IV pancreatic adenocarcinoma   -onc consulted, appreciate recs   CA 19-9 >60K   - Transitioned to hospice    Biliary obstruction  T bili trendign down    Goals of care, counseling/discussion  Made DNR, nearing comfort care.    Expect discharge to nursing home  hospice         COPD (chronic obstructive pulmonary disease)  - breathing treatment as needed      Congestive heart failure (CHF)  Borderline EF with signs of diastolic dysfunction  - Holding BB given severe COPD      Celiac disease  Stable no active issue     Plan:   - gluten free diet   -nutrition consult given history of celiac disease and better sources of gluten free options        Dementia in Alzheimer's disease  Patient mental status at baseline    Plan:  - Delirium precautions               VTE Risk Mitigation (From admission, onward)        Ordered     apixaban tablet 10 mg  2 times daily      05/05/19 1247     IP VTE HIGH RISK PATIENT  Once      04/26/19 1926     Place sequential compression device  Until discontinued      04/26/19 1843              Nasim Paige MD  Department of Hospital Medicine   [facility]  [place]

## 2019-05-08 VITALS
HEART RATE: 86 BPM | BODY MASS INDEX: 18.75 KG/M2 | HEIGHT: 63 IN | SYSTOLIC BLOOD PRESSURE: 113 MMHG | TEMPERATURE: 98 F | WEIGHT: 105.81 LBS | RESPIRATION RATE: 18 BRPM | OXYGEN SATURATION: 100 % | DIASTOLIC BLOOD PRESSURE: 64 MMHG

## 2019-05-08 LAB
BACTERIA BLD CULT: NORMAL
BACTERIA BLD CULT: NORMAL

## 2019-05-08 PROCEDURE — 63600175 PHARM REV CODE 636 W HCPCS: Mod: HCNC | Performed by: STUDENT IN AN ORGANIZED HEALTH CARE EDUCATION/TRAINING PROGRAM

## 2019-05-08 PROCEDURE — 99238 HOSP IP/OBS DSCHRG MGMT 30/<: CPT | Mod: HCNC,GC,, | Performed by: HOSPITALIST

## 2019-05-08 PROCEDURE — 99238 PR HOSPITAL DISCHARGE DAY,<30 MIN: ICD-10-PCS | Mod: HCNC,GC,, | Performed by: HOSPITALIST

## 2019-05-08 PROCEDURE — 25000003 PHARM REV CODE 250: Mod: HCNC | Performed by: STUDENT IN AN ORGANIZED HEALTH CARE EDUCATION/TRAINING PROGRAM

## 2019-05-08 PROCEDURE — 25000003 PHARM REV CODE 250: Mod: HCNC | Performed by: HOSPITALIST

## 2019-05-08 RX ORDER — ONDANSETRON 4 MG/1
8 TABLET, ORALLY DISINTEGRATING ORAL EVERY 6 HOURS PRN
Start: 2019-05-08

## 2019-05-08 RX ORDER — ONDANSETRON 4 MG/1
4 TABLET, ORALLY DISINTEGRATING ORAL EVERY 6 HOURS PRN
Status: DISCONTINUED | OUTPATIENT
Start: 2019-05-08 | End: 2019-05-08 | Stop reason: HOSPADM

## 2019-05-08 RX ORDER — LORAZEPAM 0.5 MG/1
0.5 TABLET ORAL EVERY 6 HOURS PRN
Qty: 30 TABLET | Refills: 0 | Status: SHIPPED | OUTPATIENT
Start: 2019-05-08 | End: 2019-06-07

## 2019-05-08 RX ADMIN — CIPROFLOXACIN HYDROCHLORIDE 500 MG: 500 TABLET, FILM COATED ORAL at 09:05

## 2019-05-08 RX ADMIN — APIXABAN 10 MG: 5 TABLET, FILM COATED ORAL at 09:05

## 2019-05-08 RX ADMIN — METRONIDAZOLE 500 MG: 500 TABLET ORAL at 05:05

## 2019-05-08 RX ADMIN — ONDANSETRON 4 MG: 4 TABLET, ORALLY DISINTEGRATING ORAL at 10:05

## 2019-05-08 RX ADMIN — ONDANSETRON 4 MG: 4 TABLET, ORALLY DISINTEGRATING ORAL at 04:05

## 2019-05-08 NOTE — DISCHARGE INSTRUCTIONS
Endoscopic Ultrasound (EUS)    An endoscopic ultrasound (EUS) is a test to look at the inside of your gastrointestinal (GI) tract. It's commonly used to look for cancers or growths in the esophagus, stomach, pancreas, liver, and rectum. It can help to stage cancer (see how advanced a cancer is). EUS may also be used to help diagnose certain diseases or to drain cysts or abscesses.  What is EUS?  EUS shows both ultrasound images and live video of the GI tract. During the test, a flexible tube called an endoscope (scope) is used. At the end of the scope is a tiny video camera and light. The video camera sends live images to a monitor. The scope also contains a very small ultrasound device. This uses sound waves to create images and send them to a monitor.  A needle is passed through the scope. The needle can be used take a small sample of tissue for testing. This is called a biopsy. The needle can be used to take a sample of fluid. This is called fine-needle aspiration (FNA).  Risks and possible complications of EUS  Risks and possible complications include the following:  · Bleeding  · Infection  · A perforation (hole) in the digestive tract   · Risks of sedation or anesthesia   Before the test  Be prepared prior to the test:  · Tell your healthcare provider what medicine you take. This includes vitamins, herbs, and over-the-counter medicine. It also includes any blood thinners, such as warfarin, clopidogrel, ibuprofen, or daily aspirin. Ask your healthcare provider if you need to stop taking some or all of them before the test.  · You may be prescribed antibiotics to take before or after the test. This depends on the area being studied and what is done during the test. These medicines help prevent infection.  · Carefully follow the instructions for preparing for the test to make sure results are accurate. Instructions may include:  ¨ If youre having an EUS of the upper GI tract (esophagus, stomach, duodenum,  pancreas, liver):  § Do not eat or drink for 6 hours before the test.  ¨ If youre having an EUS of the lower GI tract (rectum):  § Before the test, do bowel prep as instructed to clean your rectum of stool. This may involve a clear liquid diet and using a laxative (liquid or pills) the night before the test. Or it may mean doing one or more enemas the morning of the test.  § Do not eat or drink for 6 hours before the test.  · Be sure to arrive on time at the facility. Bring your identification and health insurance card. Leave valuables at home. If you have them, bring X-rays or other test results with you.  Let the healthcare provider know  For your safety, tell the healthcare provider if you:  · Take insulin. Your dose may need to be changed on the day of your test.  · Are allergic to latex.  · Have any other allergies.  · Are taking blood thinners.   During the test  An endoscopic ultrasound usually takes place in a hospital. The procedure itself may take 1 to 2 hours. You will likely go home soon afterward. During the test:  · You lie on your left side on an exam table.  · An intravenous (IV) line will be put into a vein in your arm or hand. This line supplies fluids and medicines. To keep you comfortable during the test, you will be given a sedative medicine. This medicine prevents discomfort and will make you sleepy.  · If you are having an EUS of the upper GI tract, local anesthetic may be sprayed in your throat. This will help you be more comfortable as the healthcare provider inserts the scope. The healthcare provider then gently puts the flexible scope into your mouth or nose and down your throat.  · If youre having an EUS of the lower GI tract, the healthcare provider gently puts the flexible scope into your anus.  · During the test, the scope sends live video and ultrasound images from inside your body to nearby monitors. These are used to examine your GI tract. Specialized procedures, such as drainage,  are done as needed.  · The healthcare provider may discuss the results with you soon after the test. Biopsy results take several  days.  · In most cases, you can go home within a few hours of the test. When you leave the facility, have an adult family member or friend drive you, even if you don't feel that sleepy.  After the test  Here is what to expect after the test:  · You may feel tired from the sedative. This should wear off by the end of the day.  · If you had an upper digestive endoscopy, your throat may feel sore for a day or two. Over-the-counter sore throat lozenges and spray should help.  · You can eat and drink normally as soon as the test is done.  When to call the healthcare provider  Call your healthcare provider if you notice any of the following:  · Fever of 100.4°F (38.0°C) or higher, or as advised by your healthcare provider  · Shortness of breath  · Vomiting blood, blood in stool, or black stools  · Coughing or hoarse voice that wont go away   Date Last Reviewed: 7/1/2016 © 2000-2017 [employer]. [address] [place] [address]. All rights reserved. This information is not intended as a substitute for professional medical care. Always follow your healthcare professional's instructions.        Discharge Instructions for ERCP (Endoscopic Retrograde Cholangiopancreatography)  You had a procedure known as an ERCP. Your healthcare provider performed the ERCP to look at your bile or pancreatic ducts, and to locate and treat blockages in the ducts. This procedure is used to diagnose diseases of the pancreas, bile ducts, and pancreatic duct, liver, and gallbladder. Heres what you need to do following your ERCP.  Home care  · Dont take aspirin or any other blood-thinning medicines (anticoagulants) until your provider says its OK.  · Your provider may prescribe an antibiotic, depending on what was done during the ERCP.  · You may have a sore throat for 1 to 2 days after the  procedure. Use lozenges or gargle with salt water for your sore throat. If you're not better in a few days, call your provider.  · Rest, drink fluids, and eat light meals. If you feel bloated or have too much gas, use a heating pad on your belly to help reduce the discomfort. This should help you feel better. But if it doesn't, call your provider.  · Dont drink alcohol for 2 days after the procedure.  Follow-up care  Make a follow-up appointment as directed by our staff.     When to seek medical care  Call your provider right away if you have any of the following:  · Trouble swallowing or throat pain that gets worse   · Chest pain or severe belly or abdominal pain  · Fever above 100°F (37.7°C) or chills  · Upset stomach (nausea) and vomiting  · Black or tarry stools   Date Last Reviewed: 6/13/2015  © 7867-2615 [employer]. [address] [place] [address]. All rights reserved. This information is not intended as a substitute for professional medical care. Always follow your healthcare professional's instructions.        ERCP (Endoscopic Retrograde Cholangiopancreatography)     A balloon at the tip of a catheter opens above the stone. The stone is pulled out of the duct and leaves your body through stool.     ERCP stands for endoscopic retrograde cholangiopancreatography. This procedure is used to view the biliary and pancreatic ducts.  It is used to evaluate diseases that affect the ducts and to help locate and treat blockages that may be present.  How do I get ready for ERCP?  · Talk to your doctor about any health problems or allergies you have.  · Ask your doctor about the risks of ERCP. These include pancreatitis, infection, bleeding, and tearing the bowel.  · Be sure your doctor knows about all medicines you take. You may be told to stop taking some or all of them before the test. This includes:  · All prescription medicines  · Over-the-counter medicines that don't need a  prescription  ·  Any street drugs you may use   · Herbs, vitamins, kelp, seaweed, cough syrups, and other supplements  · You may be asked to take antibiotics ahead of time.  · Avoid blood-thinning medicines for 1 week before ERCP.  · Do not eat or drink for 8 to 12 hours before ERCP.  · Have someone ready to take you home.  What happens during the procedure?  · You may be given medicine through an IV to help you relax.  · Your throat is numbed.  · A thin tube (endoscope) is placed into your throat. It is advanced from the throat through the upper digestive tract, to the common bile duct opening. The endoscope lets the doctor see the common bile and pancreatic ducts on a video screen.  · A cut may be made where the common bile duct opens to the duodenum to make it easier to remove stones.  · As blockages are located and removed, X-rays are taken.  · Contrast dye is injected through a catheter to make the duct show up better on the X-rays.  · An imaging technique that uses X-rays to obtain real-time moving images of internal organs is called fluoroscopy. Fluoroscopy is used to watch and guide progress of the procedure.   · In some cases, a plastic tube (stent) is placed to hold the ducts open. This stent may be replaced or removed in 6 to 8 weeks. Or it may be left to fall out on its own and be passed in the stool.  What happens after ERCP?  Your doctor may discuss the test results right away or a return visit may be scheduled. You may go home the same day or spend the night in the hospital. Follow these tips:  · You can return to a normal routine the day after the ERCP.  · If a cut was made in the duct, avoid blood-thinning medicines such as aspirin for 5 to 7 days.  · Call your doctor right away if you have a fever or abdominal pain. These may be signs of an infection or torn bowel.   Date Last Reviewed: 6/19/2015  © 2827-0481 [employer]. [address] [place] [address], [place] [address]. All rights reserved.  This information is not intended as a substitute for professional medical care. Always follow your healthcare professional's instructions.

## 2019-05-08 NOTE — DISCHARGE SUMMARY
[facility] Hospital Medicine  Discharge Summary      Patient Name: Zeynep Hamm  MRN: [id number]  Admission Date: 4/26/2019  Hospital Length of Stay: 12 days  Discharge Date and Time:  05/08/2019 6:39 PM  Attending Physician: No att. providers found   Discharging Provider: Darrius Daniels MD  Primary Care Provider: Maya Kelley MD  [facility] Medicine Team: [facility]OSP MED 5 Darrius Daniels MD    HPI:   87 years old female with past medical history of Alzheimer, COPD, Cataract, Celiac disease, Diastolic HF (EF 45%) presented to ED with fatigue.  Per patient daughter, she has been increasingly getting weaker over the last 3 weeks to a point where she is unable to stand up today and that's when the daughter decided to bring the patient to ED, at baseline patient able to walk short distance, she also has been sleeping more, eat less than usual with gradual wieght loss over coupleof weeks, noticed to have yellowish skin discoloration today, otherwise has no fever, chills, chest pain, shortness of breath, abdominal pain, nausea, vomiting, diarrhea and urinary symptoms. Patient quit smoking 35 years ago, doesn't drink alcohol, no recent surgeries or travel, has family history of leukemia and mediastinal mass.She was admitted to the hospital recently on 4/18/2019 with COPD exacerbation and new onset systolic congestive heart failure, started on lasix.  At ED patient patient was hypotensive and tachycardic, labs significant for elevated WBC, LFTs and bilirubin, U/S abdomin showed head of pancrease mass with extra and intrahepatic biliary ductal dilatation and hepatic lesions.        Procedure(s) (LRB):  ERCP (ENDOSCOPIC RETROGRADE CHOLANGIOPANCREATOGRAPHY) (N/A)  ULTRASOUND, UPPER GI TRACT, ENDOSCOPIC (N/A)      Hospital Course:   Admitted for suspected cholangitis, From primary pancreatic adenocarcinoma and innumerable large liver lesions concerning for metastatic disease. She is on  "Ciprofloxacin and Flagyl for acute cholangitis which was completed. She is transitioned to hospice due to poor prognosis and emphasis on comfort care; is being discharge to NH hospice. She will continue Apixaban for B/L DVT, ativan and zofran as needed.      Review of Systems   Unable to perform ROS: Acuity of condition      Objective:      /64 (Patient Position: Lying)   Pulse 86   Temp 97.5 °F (36.4 °C) (Oral)   Resp 18   Ht 5' 3" (1.6 m)   Wt 48 kg (105 lb 13.1 oz)   SpO2 100%   Breastfeeding? No   BMI 18.75 kg/m²     Physical Exam   Constitutional: She appears well-developed. No distress.   thin   HENT:   Head: Normocephalic and atraumatic.   Eyes: Pupils are equal, round, and reactive to light.   Cardiovascular: Regular rhythm and normal heart sounds.    Pulmonary/Chest: Breath sounds normal.   On NC   Abdominal: Soft. Bowel sounds are normal. She exhibits no distension. There is no tenderness (Very mild, RUQ).   Musculoskeletal: She exhibits no edema or tenderness.   Neurological: She is alert.   O x 2 to place and person   Skin: She is not diaphoretic. No pallor.   slightly jaundiced   Psychiatric: She has a normal mood and affect.   Vitals reviewed.    Consults:   Consults (From admission, onward)        Status Ordering Provider     Inpatient consult to [facility]  Once     Provider:  (Not yet assigned)    Completed ADÁN RAMÍREZ     Inpatient consult to Hematology/Oncology  Once     Provider:  (Not yet assigned)    Completed MIRIAM MARIAA     Inpatient consult to Hospice  Once     Provider:  (Not yet assigned)    Acknowledged REYNOLD MARIA     Inpatient consult to Palliative Care  Once     Provider:  (Not yet assigned)    Completed CANDACEMIRIAM MIRELESA     Inpatient consult to Registered Dietitian/Nutritionist  Once     Provider:  (Not yet assigned)    Completed CANDACEMIRIAMA     Inpatient consult to SNF  Once     Provider:  (Not yet assigned)    Acknowledged OFELIA STEEN     " Inpatient consult to Social Work  Once     Provider:  (Not yet assigned)    Acknowledged KATJA GREEN     IP consult to case management  Once     Provider:  (Not yet assigned)    Acknowledged OFELIA STEEN            Final Active Diagnoses:    Diagnosis Date Noted POA    PRINCIPAL PROBLEM:  Neoplasm of head of pancreas [D49.0] 04/26/2019 Yes    Discharge planning issues [Z02.9] 05/06/2019 Not Applicable    Comfort measures only status [Z51.5] 05/03/2019 Not Applicable    Palliative care encounter [Z51.5] 04/30/2019 Not Applicable    DNR (do not resuscitate) [Z66] 04/30/2019 Unknown    Acute cholangitis [K83.09] 04/30/2019 Unknown    Pancreatic mass [K86.9] 04/29/2019 Unknown    Metastasis to liver [C78.7] 04/29/2019 Unknown    Goals of care, counseling/discussion [Z71.89] 04/27/2019 Not Applicable    Biliary obstruction [K83.1] 04/27/2019 Unknown    COPD (chronic obstructive pulmonary disease) [J44.9] 04/26/2019 Yes    Congestive heart failure (CHF) [I50.9] 04/20/2019 Yes    Celiac disease [K90.0] 04/18/2019 Yes     Chronic    Dementia in Alzheimer's disease [G30.9, F02.80] 01/31/2018 Yes      Problems Resolved During this Admission:       Discharged Condition: fair    Disposition: Hospice/Medical Facility    Follow Up:    Patient Instructions:   No discharge procedures on file.    Significant Diagnostic Studies: Labs: CMP No results for input(s): NA, K, CL, CO2, GLU, BUN, CREATININE, CALCIUM, PROT, ALBUMIN, BILITOT, ALKPHOS, AST, ALT, ANIONGAP, ESTGFRAFRICA, EGFRNONAA in the last 48 hours. and CBC No results for input(s): WBC, HGB, HCT, PLT in the last 48 hours.    Pending Diagnostic Studies:     None         Medications:  Reconciled Home Medications:      Medication List      START taking these medications    * apixaban 5 mg Tab  Commonly known as:  ELIQUIS  Take 2 tablets (10 mg total) by mouth 2 (two) times daily. Take 10mg BID for 7 days, then 5mg BID afterwards for 7 days     *  apixaban 5 mg Tab  Commonly known as:  ELIQUIS  Take 1 tablet (5 mg total) by mouth 2 (two) times daily. Take 10mg BID for 7 days then 5mg BID for 1 day  Start taking on:  5/13/2019     LORazepam 0.5 MG tablet  Commonly known as:  ATIVAN  Take 1 tablet (0.5 mg total) by mouth every 6 (six) hours as needed for Anxiety (RASS score greater than 0).     ondansetron 4 MG Tbdl  Commonly known as:  ZOFRAN-ODT  Take 2 tablets (8 mg total) by mouth every 6 (six) hours as needed.         * This list has 2 medication(s) that are the same as other medications prescribed for you. Read the directions carefully, and ask your doctor or other care provider to review them with you.            CONTINUE taking these medications    albuterol 90 mcg/actuation inhaler  Commonly known as:  PROVENTIL/VENTOLIN HFA  Inhale 2 puffs into the lungs every 4 (four) hours as needed for Wheezing.     albuterol-ipratropium 2.5 mg-0.5 mg/3 mL nebulizer solution  Commonly known as:  DUO-NEB  Take 3 mLs by nebulization every 6 (six) hours as needed for Wheezing or Shortness of Breath. Rescue        STOP taking these medications    acetaminophen 325 MG tablet  Commonly known as:  TYLENOL     b complex vitamins tablet     furosemide 20 MG tablet  Commonly known as:  LASIX     multivitamin capsule     SYMBICORT 160-4.5 mcg/actuation Hfaa  Generic drug:  budesonide-formoterol 160-4.5 mcg     umeclidinium-vilanterol 62.5-25 mcg/actuation Dsdv  Commonly known as:  ANORO ELLIPTA            Indwelling Lines/Drains at time of discharge:   Lines/Drains/Airways          None            Patient was seen and examined on the date of discharge and determined to be suitable for discharge.         Darrius Daniels MD  Department of Hospital Medicine  [facility]

## 2019-05-08 NOTE — PLAN OF CARE
CM spoke with Codi from [facility]. Facility requests daughter arrive at 1030A to sign paper work. Informed daughter and she will arrive to facility at that time. Discharge remains planned for today. Family signed consents with [facility] yesterday after noon. Hospice to admit patient upon arrival to Nursing Home.

## 2019-05-08 NOTE — PLAN OF CARE
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]  Yes     Priority: 1 - High    Comfort measures only status [Z51.5]  Not Applicable     Priority: 1 - High    Discharge planning issues [Z02.9]  Not Applicable    Palliative care encounter [Z51.5]  Not Applicable    DNR (do not resuscitate) [Z66]  Unknown    Acute cholangitis [K83.09]  Unknown    Pancreatic mass [K86.9]  Unknown    Metastasis to liver [C78.7]  Unknown    Goals of care, counseling/discussion [Z71.89]  Not Applicable    Biliary obstruction [K83.1]  Unknown    COPD (chronic obstructive pulmonary disease) [J44.9]  Yes    Congestive heart failure (CHF) [I50.9]  Yes    Celiac disease [K90.0]  Yes     Chronic    Dementia in Alzheimer's disease [G30.9, F02.80]  Yes      Resolved Hospital Problems   No resolved problems to display.       Patient is homebound due to:  Neoplasm of head of pancreas    Allergies:  Review of patient's allergies indicates:   Allergen Reactions    Gluten protein        Vitals:      Per Routine     Diet: Pleasure                 Acitivities:  As tolerated   - Up in a chair each morning as tolerated      LABS:      Nursing Precautions:    - Aspiration precautions:             - Total assistance with meals            -  Upright 90 degrees befor during and after meals             -  Suction at bedside          - Fall precautions per nursing home protocol   - Decubitus precautions:        -  for positioning   - Pressure reducing foam mattress   - Turn patient every two hours. Use wedge pillows to anchor  patient    CONSULTS:      [facility]        to evaluate for hospice, and clarify code status with family/                                       healthcare power of     MISCELLANEOUS CARE:     Routine Skin for Bedridden Patients:  Apply moisture barrier cream to all    skin folds and wet areas in perineal area daily and after baths and                           all bowel movements.    Medications: Discontinue all previous medication orders, if any. See new list below.     Zeynep Hamm   Home Medication Instructions BEL:[id number]    Printed on:05/08/19 1227   Medication Information                      albuterol 90 mcg/actuation inhaler  Inhale 2 puffs into the lungs every 4 (four) hours as needed for Wheezing.             albuterol-ipratropium (DUO-NEB) 2.5 mg-0.5 mg/3 mL nebulizer solution  Take 3 mLs by nebulization every 6 (six) hours as needed for Wheezing or Shortness of Breath. Rescue             apixaban (ELIQUIS) 5 mg Tab  Take 2 tablets (10 mg total) by mouth 2 (two) times daily. Take 10mg BID for 7 days, then 5mg BID afterwards for 7 days             apixaban (ELIQUIS) 5 mg Tab  Take 1 tablet (5 mg total) by mouth 2 (two) times daily. Take 10mg BID for 7 days then 5mg BID for 1 day             LORazepam (ATIVAN) 0.5 MG tablet  Take 1 tablet (0.5 mg total) by mouth every 6 (six) hours as needed for Anxiety (RASS score greater than 0).             ondansetron (ZOFRAN-ODT) 4 MG TbDL  Take 2 tablets (8 mg total) by mouth every 6 (six) hours as needed.                 Digitally signed   _________________________________  Nasim Paige MD  05/08/2019

## 2019-05-08 NOTE — PLAN OF CARE
05/08/19 1515   Post-Acute Status   Post-Acute Authorization Placement   Post-Acute Placement Status Set-up Complete   SW arranged stretcher transport via [facility]. Requested  time is 4:30PM. Requested  time does not guarantee arrival time.  Nurse can call report to [phone].    Thao Avila LMSW  [facility]  [phone]

## 2019-05-08 NOTE — PLAN OF CARE
Problem: Adult Inpatient Plan of Care  Goal: Plan of Care Review  Outcome: Ongoing (interventions implemented as appropriate)  Discharge Note: Patient will discharge to [facility] under [facility] for comfort care. Report called in to LPN at the facility. [facility] scheduled to  patient by 4:30pm.

## 2019-05-09 NOTE — PLAN OF CARE
Patient discharged to [facility] as a retirement resident[facility]     05/09/19 0815   Final Note   Assessment Type Final Discharge Note   Anticipated Discharge Disposition [facility]   Hospital Follow Up  Appt(s) scheduled? No   Discharge plans and expectations educations in teach back method with documentation complete? Yes   Right Care Referral Info   Referral Type [facility] Nursing Home/ Hospice   [facility]

## 2019-10-28 ENCOUNTER — PES CALL (OUTPATIENT)
Dept: ADMINISTRATIVE | Facility: CLINIC | Age: 84
End: 2019-10-28

## 2025-03-25 NOTE — PLAN OF CARE
Problem: Adult Inpatient Plan of Care  Goal: Plan of Care Review     05/1935   Plan of Care Review   Plan of Care Reviewed With patient   Family at bedside and attentive to pt's needs.  Pt DNR and on comfort measures.  Telemetry discontinued, pt running tachy at times between 130-140s.  MD notified with no new orders.  Pt asymptomatic during tachy episodes.  Pt turned and repositioned with wedge every 2 hours.  All other vitals within normal limits.  Purewick intact and in place and working properly.  NAD.  Will con to monitor       no